# Patient Record
Sex: FEMALE | Race: WHITE | Employment: FULL TIME | URBAN - METROPOLITAN AREA
[De-identification: names, ages, dates, MRNs, and addresses within clinical notes are randomized per-mention and may not be internally consistent; named-entity substitution may affect disease eponyms.]

---

## 2017-02-10 RX ORDER — KETOROLAC TROMETHAMINE 10 MG/1
10 TABLET, FILM COATED ORAL 3 TIMES DAILY PRN
Qty: 30 TABLET | Refills: 0 | Status: SHIPPED | OUTPATIENT
Start: 2017-02-10 | End: 2017-05-17 | Stop reason: SDUPTHER

## 2017-05-17 ENCOUNTER — OFFICE VISIT (OUTPATIENT)
Dept: FAMILY MEDICINE CLINIC | Age: 32
End: 2017-05-17

## 2017-05-17 VITALS
HEIGHT: 62 IN | DIASTOLIC BLOOD PRESSURE: 84 MMHG | SYSTOLIC BLOOD PRESSURE: 104 MMHG | BODY MASS INDEX: 48.21 KG/M2 | HEART RATE: 96 BPM | WEIGHT: 262 LBS

## 2017-05-17 DIAGNOSIS — F39 MOOD DISORDER (HCC): Primary | ICD-10-CM

## 2017-05-17 PROCEDURE — 99213 OFFICE O/P EST LOW 20 MIN: CPT | Performed by: FAMILY MEDICINE

## 2017-05-17 RX ORDER — KETOROLAC TROMETHAMINE 10 MG/1
10 TABLET, FILM COATED ORAL 3 TIMES DAILY PRN
Qty: 30 TABLET | Refills: 0 | Status: SHIPPED | OUTPATIENT
Start: 2017-05-17 | End: 2017-06-26 | Stop reason: CLARIF

## 2017-05-17 RX ORDER — CARBAMAZEPINE 200 MG/1
CAPSULE, EXTENDED RELEASE ORAL
Qty: 120 CAPSULE | Refills: 3 | Status: SHIPPED | OUTPATIENT
Start: 2017-05-17 | End: 2017-06-19 | Stop reason: SDUPTHER

## 2017-05-17 ASSESSMENT — PATIENT HEALTH QUESTIONNAIRE - PHQ9
10. IF YOU CHECKED OFF ANY PROBLEMS, HOW DIFFICULT HAVE THESE PROBLEMS MADE IT FOR YOU TO DO YOUR WORK, TAKE CARE OF THINGS AT HOME, OR GET ALONG WITH OTHER PEOPLE: 3
8. MOVING OR SPEAKING SO SLOWLY THAT OTHER PEOPLE COULD HAVE NOTICED. OR THE OPPOSITE, BEING SO FIGETY OR RESTLESS THAT YOU HAVE BEEN MOVING AROUND A LOT MORE THAN USUAL: 2
5. POOR APPETITE OR OVEREATING: 3
7. TROUBLE CONCENTRATING ON THINGS, SUCH AS READING THE NEWSPAPER OR WATCHING TELEVISION: 3
1. LITTLE INTEREST OR PLEASURE IN DOING THINGS: 3
SUM OF ALL RESPONSES TO PHQ9 QUESTIONS 1 & 2: 3
4. FEELING TIRED OR HAVING LITTLE ENERGY: 3
9. THOUGHTS THAT YOU WOULD BE BETTER OFF DEAD, OR OF HURTING YOURSELF: 3
SUM OF ALL RESPONSES TO PHQ QUESTIONS 1-9: 23
3. TROUBLE FALLING OR STAYING ASLEEP: 3
6. FEELING BAD ABOUT YOURSELF - OR THAT YOU ARE A FAILURE OR HAVE LET YOURSELF OR YOUR FAMILY DOWN: 3

## 2017-06-19 ENCOUNTER — OFFICE VISIT (OUTPATIENT)
Dept: FAMILY MEDICINE CLINIC | Age: 32
End: 2017-06-19

## 2017-06-19 VITALS
WEIGHT: 261 LBS | SYSTOLIC BLOOD PRESSURE: 126 MMHG | DIASTOLIC BLOOD PRESSURE: 86 MMHG | HEART RATE: 88 BPM | BODY MASS INDEX: 47.74 KG/M2

## 2017-06-19 DIAGNOSIS — F31.81 BIPOLAR 2 DISORDER (HCC): Primary | ICD-10-CM

## 2017-06-19 DIAGNOSIS — F90.0 ADHD, PREDOMINANTLY INATTENTIVE TYPE: ICD-10-CM

## 2017-06-19 LAB
CARBAMAZEPINE DOSE: NORMAL
CARBAMAZEPINE LEVEL: 6.2 UG/ML (ref 4–12)

## 2017-06-19 PROCEDURE — 99213 OFFICE O/P EST LOW 20 MIN: CPT | Performed by: FAMILY MEDICINE

## 2017-06-19 RX ORDER — CARBAMAZEPINE 200 MG/1
400 CAPSULE, EXTENDED RELEASE ORAL 2 TIMES DAILY
Qty: 120 CAPSULE | Refills: 3 | COMMUNITY
Start: 2017-06-19 | End: 2018-01-08 | Stop reason: SDUPTHER

## 2017-06-26 ENCOUNTER — TELEPHONE (OUTPATIENT)
Dept: FAMILY MEDICINE CLINIC | Age: 32
End: 2017-06-26

## 2018-01-08 ENCOUNTER — OFFICE VISIT (OUTPATIENT)
Dept: FAMILY MEDICINE CLINIC | Age: 33
End: 2018-01-08

## 2018-01-08 VITALS
BODY MASS INDEX: 48.1 KG/M2 | DIASTOLIC BLOOD PRESSURE: 82 MMHG | SYSTOLIC BLOOD PRESSURE: 118 MMHG | HEART RATE: 101 BPM | WEIGHT: 263 LBS

## 2018-01-08 DIAGNOSIS — F31.81 BIPOLAR 2 DISORDER (HCC): Primary | ICD-10-CM

## 2018-01-08 DIAGNOSIS — F32.1 MODERATE SINGLE CURRENT EPISODE OF MAJOR DEPRESSIVE DISORDER (HCC): Chronic | ICD-10-CM

## 2018-01-08 PROCEDURE — 4004F PT TOBACCO SCREEN RCVD TLK: CPT | Performed by: FAMILY MEDICINE

## 2018-01-08 PROCEDURE — G8417 CALC BMI ABV UP PARAM F/U: HCPCS | Performed by: FAMILY MEDICINE

## 2018-01-08 PROCEDURE — 99213 OFFICE O/P EST LOW 20 MIN: CPT | Performed by: FAMILY MEDICINE

## 2018-01-08 PROCEDURE — G8484 FLU IMMUNIZE NO ADMIN: HCPCS | Performed by: FAMILY MEDICINE

## 2018-01-08 PROCEDURE — G8427 DOCREV CUR MEDS BY ELIG CLIN: HCPCS | Performed by: FAMILY MEDICINE

## 2018-01-08 RX ORDER — VENLAFAXINE HYDROCHLORIDE 37.5 MG/1
37.5 CAPSULE, EXTENDED RELEASE ORAL DAILY
Qty: 21 CAPSULE | Refills: 3 | Status: SHIPPED | OUTPATIENT
Start: 2018-01-08 | End: 2018-02-12

## 2018-01-08 RX ORDER — VENLAFAXINE HYDROCHLORIDE 150 MG/1
150 CAPSULE, EXTENDED RELEASE ORAL DAILY
Qty: 30 CAPSULE | Refills: 3 | Status: SHIPPED | OUTPATIENT
Start: 2018-01-08 | End: 2018-06-29 | Stop reason: SDUPTHER

## 2018-01-08 RX ORDER — CARBAMAZEPINE 200 MG/1
400 CAPSULE, EXTENDED RELEASE ORAL 2 TIMES DAILY
Qty: 120 CAPSULE | Refills: 5 | Status: SHIPPED | OUTPATIENT
Start: 2018-01-08 | End: 2018-08-22 | Stop reason: SDUPTHER

## 2018-01-22 ENCOUNTER — TELEPHONE (OUTPATIENT)
Dept: FAMILY MEDICINE CLINIC | Age: 33
End: 2018-01-22

## 2018-01-22 RX ORDER — VENLAFAXINE HYDROCHLORIDE 37.5 MG/1
37.5 CAPSULE, EXTENDED RELEASE ORAL DAILY
Qty: 7 CAPSULE | Refills: 0 | Status: SHIPPED | OUTPATIENT
Start: 2018-01-22 | End: 2018-02-12

## 2018-01-22 RX ORDER — VENLAFAXINE HYDROCHLORIDE 75 MG/1
75 CAPSULE, EXTENDED RELEASE ORAL DAILY
Qty: 30 CAPSULE | Refills: 3 | Status: SHIPPED | OUTPATIENT
Start: 2018-01-22 | End: 2018-02-12

## 2018-02-12 ENCOUNTER — OFFICE VISIT (OUTPATIENT)
Dept: FAMILY MEDICINE CLINIC | Age: 33
End: 2018-02-12

## 2018-02-12 VITALS
BODY MASS INDEX: 48.1 KG/M2 | SYSTOLIC BLOOD PRESSURE: 118 MMHG | HEART RATE: 83 BPM | DIASTOLIC BLOOD PRESSURE: 80 MMHG | WEIGHT: 263 LBS

## 2018-02-12 DIAGNOSIS — F90.0 ADHD, PREDOMINANTLY INATTENTIVE TYPE: Primary | ICD-10-CM

## 2018-02-12 DIAGNOSIS — F31.81 BIPOLAR 2 DISORDER (HCC): ICD-10-CM

## 2018-02-12 PROCEDURE — 99213 OFFICE O/P EST LOW 20 MIN: CPT | Performed by: FAMILY MEDICINE

## 2018-02-12 PROCEDURE — 4004F PT TOBACCO SCREEN RCVD TLK: CPT | Performed by: FAMILY MEDICINE

## 2018-02-12 PROCEDURE — G8484 FLU IMMUNIZE NO ADMIN: HCPCS | Performed by: FAMILY MEDICINE

## 2018-02-12 PROCEDURE — G8417 CALC BMI ABV UP PARAM F/U: HCPCS | Performed by: FAMILY MEDICINE

## 2018-02-12 PROCEDURE — G8427 DOCREV CUR MEDS BY ELIG CLIN: HCPCS | Performed by: FAMILY MEDICINE

## 2018-02-12 RX ORDER — DEXTROAMPHETAMINE SACCHARATE, AMPHETAMINE ASPARTATE, DEXTROAMPHETAMINE SULFATE AND AMPHETAMINE SULFATE 2.5; 2.5; 2.5; 2.5 MG/1; MG/1; MG/1; MG/1
10 TABLET ORAL DAILY
Qty: 30 TABLET | Refills: 0 | Status: SHIPPED | OUTPATIENT
Start: 2018-02-12 | End: 2018-03-05 | Stop reason: SDUPTHER

## 2018-03-05 ENCOUNTER — OFFICE VISIT (OUTPATIENT)
Dept: FAMILY MEDICINE CLINIC | Age: 33
End: 2018-03-05

## 2018-03-05 VITALS
BODY MASS INDEX: 46.93 KG/M2 | OXYGEN SATURATION: 98 % | HEART RATE: 96 BPM | SYSTOLIC BLOOD PRESSURE: 122 MMHG | WEIGHT: 255 LBS | HEIGHT: 62 IN | DIASTOLIC BLOOD PRESSURE: 84 MMHG | RESPIRATION RATE: 14 BRPM

## 2018-03-05 DIAGNOSIS — F90.0 ADHD, PREDOMINANTLY INATTENTIVE TYPE: Primary | ICD-10-CM

## 2018-03-05 DIAGNOSIS — F31.81 BIPOLAR 2 DISORDER (HCC): ICD-10-CM

## 2018-03-05 PROCEDURE — G8427 DOCREV CUR MEDS BY ELIG CLIN: HCPCS | Performed by: FAMILY MEDICINE

## 2018-03-05 PROCEDURE — 99213 OFFICE O/P EST LOW 20 MIN: CPT | Performed by: FAMILY MEDICINE

## 2018-03-05 PROCEDURE — G8484 FLU IMMUNIZE NO ADMIN: HCPCS | Performed by: FAMILY MEDICINE

## 2018-03-05 PROCEDURE — 4004F PT TOBACCO SCREEN RCVD TLK: CPT | Performed by: FAMILY MEDICINE

## 2018-03-05 PROCEDURE — G8417 CALC BMI ABV UP PARAM F/U: HCPCS | Performed by: FAMILY MEDICINE

## 2018-03-05 RX ORDER — DEXTROAMPHETAMINE SACCHARATE, AMPHETAMINE ASPARTATE, DEXTROAMPHETAMINE SULFATE AND AMPHETAMINE SULFATE 2.5; 2.5; 2.5; 2.5 MG/1; MG/1; MG/1; MG/1
10 TABLET ORAL DAILY
Qty: 30 TABLET | Refills: 0 | Status: SHIPPED | OUTPATIENT
Start: 2018-04-13 | End: 2018-03-27 | Stop reason: SDUPTHER

## 2018-03-05 RX ORDER — DEXTROAMPHETAMINE SACCHARATE, AMPHETAMINE ASPARTATE, DEXTROAMPHETAMINE SULFATE AND AMPHETAMINE SULFATE 2.5; 2.5; 2.5; 2.5 MG/1; MG/1; MG/1; MG/1
10 TABLET ORAL DAILY
Qty: 30 TABLET | Refills: 0 | Status: SHIPPED | OUTPATIENT
Start: 2018-03-14 | End: 2018-04-13

## 2018-03-05 RX ORDER — DEXTROAMPHETAMINE SACCHARATE, AMPHETAMINE ASPARTATE, DEXTROAMPHETAMINE SULFATE AND AMPHETAMINE SULFATE 2.5; 2.5; 2.5; 2.5 MG/1; MG/1; MG/1; MG/1
10 TABLET ORAL DAILY
Qty: 30 TABLET | Refills: 0 | Status: SHIPPED | OUTPATIENT
Start: 2018-05-13 | End: 2018-03-27 | Stop reason: SDUPTHER

## 2018-03-05 NOTE — PROGRESS NOTES
Subjective:      Patient ID: Robin Flowers is a 34 y.o. female. HPI  Has lost 8 lbs. Going to [employer], will walk a mile on the treadmill   The Adderall smooths off the drop off from [address] happy   Work and housework are better    Review of Systems    Objective:   Physical Exam   Cardiovascular: Normal rate, regular rhythm and normal heart sounds. Pulmonary/Chest: Effort normal and breath sounds normal.   Psychiatric: She has a normal mood and affect.  Her behavior is normal.     Vitals:    03/05/18 1030   BP: 122/84   Site: Left Arm   Position: Sitting   Cuff Size: Large Adult   Pulse: 96   Resp: 14   SpO2: 98%   Weight: 255 lb (115.7 kg)   Height: 5' 2\" (1.575 m)         Assessment:       ADHD, conrtolled        Plan:      Refill meds    3 months

## 2018-03-27 ENCOUNTER — OFFICE VISIT (OUTPATIENT)
Dept: FAMILY MEDICINE CLINIC | Age: 33
End: 2018-03-27

## 2018-03-27 VITALS
SYSTOLIC BLOOD PRESSURE: 136 MMHG | BODY MASS INDEX: 47.37 KG/M2 | WEIGHT: 259 LBS | HEART RATE: 93 BPM | DIASTOLIC BLOOD PRESSURE: 92 MMHG

## 2018-03-27 DIAGNOSIS — G43.001 MIGRAINE WITHOUT AURA AND WITH STATUS MIGRAINOSUS, NOT INTRACTABLE: Primary | ICD-10-CM

## 2018-03-27 PROCEDURE — 99213 OFFICE O/P EST LOW 20 MIN: CPT | Performed by: FAMILY MEDICINE

## 2018-03-27 PROCEDURE — G8427 DOCREV CUR MEDS BY ELIG CLIN: HCPCS | Performed by: FAMILY MEDICINE

## 2018-03-27 PROCEDURE — G8417 CALC BMI ABV UP PARAM F/U: HCPCS | Performed by: FAMILY MEDICINE

## 2018-03-27 PROCEDURE — G8484 FLU IMMUNIZE NO ADMIN: HCPCS | Performed by: FAMILY MEDICINE

## 2018-03-27 PROCEDURE — 4004F PT TOBACCO SCREEN RCVD TLK: CPT | Performed by: FAMILY MEDICINE

## 2018-03-27 RX ORDER — HYDROCODONE BITARTRATE AND ACETAMINOPHEN 5; 325 MG/1; MG/1
1 TABLET ORAL EVERY 6 HOURS PRN
Qty: 20 TABLET | Refills: 0 | Status: SHIPPED | OUTPATIENT
Start: 2018-03-27 | End: 2018-04-16

## 2018-03-27 RX ORDER — SUMATRIPTAN 50 MG/1
50 TABLET, FILM COATED ORAL
Qty: 9 TABLET | Refills: 3 | Status: SHIPPED | OUTPATIENT
Start: 2018-03-27 | End: 2018-06-05

## 2018-03-30 ENCOUNTER — TELEPHONE (OUTPATIENT)
Dept: FAMILY MEDICINE CLINIC | Age: 33
End: 2018-03-30

## 2018-04-19 ENCOUNTER — TELEPHONE (OUTPATIENT)
Dept: FAMILY MEDICINE CLINIC | Age: 33
End: 2018-04-19

## 2018-04-19 RX ORDER — DEXTROAMPHETAMINE SACCHARATE, AMPHETAMINE ASPARTATE, DEXTROAMPHETAMINE SULFATE AND AMPHETAMINE SULFATE 2.5; 2.5; 2.5; 2.5 MG/1; MG/1; MG/1; MG/1
10 TABLET ORAL DAILY
Qty: 30 TABLET | Refills: 0 | Status: CANCELLED | OUTPATIENT
Start: 2018-04-19 | End: 2018-05-19

## 2018-06-05 ENCOUNTER — OFFICE VISIT (OUTPATIENT)
Dept: FAMILY MEDICINE CLINIC | Age: 33
End: 2018-06-05

## 2018-06-05 VITALS
DIASTOLIC BLOOD PRESSURE: 78 MMHG | BODY MASS INDEX: 48.1 KG/M2 | SYSTOLIC BLOOD PRESSURE: 112 MMHG | HEART RATE: 84 BPM | WEIGHT: 263 LBS

## 2018-06-05 DIAGNOSIS — F41.8 DEPRESSION WITH ANXIETY: ICD-10-CM

## 2018-06-05 DIAGNOSIS — F90.0 ADHD, PREDOMINANTLY INATTENTIVE TYPE: Primary | ICD-10-CM

## 2018-06-05 DIAGNOSIS — F32.81 PMDD (PREMENSTRUAL DYSPHORIC DISORDER): ICD-10-CM

## 2018-06-05 PROCEDURE — 4004F PT TOBACCO SCREEN RCVD TLK: CPT | Performed by: FAMILY MEDICINE

## 2018-06-05 PROCEDURE — 99213 OFFICE O/P EST LOW 20 MIN: CPT | Performed by: FAMILY MEDICINE

## 2018-06-05 PROCEDURE — G8417 CALC BMI ABV UP PARAM F/U: HCPCS | Performed by: FAMILY MEDICINE

## 2018-06-05 PROCEDURE — G8427 DOCREV CUR MEDS BY ELIG CLIN: HCPCS | Performed by: FAMILY MEDICINE

## 2018-06-05 RX ORDER — VENLAFAXINE HYDROCHLORIDE 75 MG/1
CAPSULE, EXTENDED RELEASE ORAL
Qty: 30 CAPSULE | Refills: 1 | Status: SHIPPED | OUTPATIENT
Start: 2018-06-05 | End: 2018-12-03 | Stop reason: SDUPTHER

## 2018-06-05 RX ORDER — DEXTROAMPHETAMINE SACCHARATE, AMPHETAMINE ASPARTATE, DEXTROAMPHETAMINE SULFATE AND AMPHETAMINE SULFATE 2.5; 2.5; 2.5; 2.5 MG/1; MG/1; MG/1; MG/1
10 TABLET ORAL DAILY
Qty: 30 TABLET | Refills: 0 | Status: SHIPPED | OUTPATIENT
Start: 2018-08-11 | End: 2018-12-03 | Stop reason: SDUPTHER

## 2018-06-05 RX ORDER — DEXTROAMPHETAMINE SACCHARATE, AMPHETAMINE ASPARTATE, DEXTROAMPHETAMINE SULFATE AND AMPHETAMINE SULFATE 2.5; 2.5; 2.5; 2.5 MG/1; MG/1; MG/1; MG/1
10 TABLET ORAL DAILY
Qty: 30 TABLET | Refills: 0 | Status: SHIPPED | OUTPATIENT
Start: 2018-07-12 | End: 2018-12-03 | Stop reason: SDUPTHER

## 2018-06-05 RX ORDER — DEXTROAMPHETAMINE SACCHARATE, AMPHETAMINE ASPARTATE, DEXTROAMPHETAMINE SULFATE AND AMPHETAMINE SULFATE 2.5; 2.5; 2.5; 2.5 MG/1; MG/1; MG/1; MG/1
10 TABLET ORAL DAILY
Qty: 30 TABLET | Refills: 0 | Status: SHIPPED | OUTPATIENT
Start: 2018-06-12 | End: 2018-12-03 | Stop reason: SDUPTHER

## 2018-06-05 ASSESSMENT — PATIENT HEALTH QUESTIONNAIRE - PHQ9
SUM OF ALL RESPONSES TO PHQ9 QUESTIONS 1 & 2: 2
1. LITTLE INTEREST OR PLEASURE IN DOING THINGS: 1
2. FEELING DOWN, DEPRESSED OR HOPELESS: 1
SUM OF ALL RESPONSES TO PHQ QUESTIONS 1-9: 2

## 2018-08-22 RX ORDER — CARBAMAZEPINE 200 MG/1
CAPSULE, EXTENDED RELEASE ORAL
Qty: 120 CAPSULE | Refills: 3 | Status: SHIPPED | OUTPATIENT
Start: 2018-08-22 | End: 2018-12-03 | Stop reason: SDUPTHER

## 2018-12-03 ENCOUNTER — OFFICE VISIT (OUTPATIENT)
Dept: FAMILY MEDICINE CLINIC | Age: 33
End: 2018-12-03
Payer: MEDICAID

## 2018-12-03 VITALS
WEIGHT: 263 LBS | BODY MASS INDEX: 48.1 KG/M2 | HEART RATE: 86 BPM | SYSTOLIC BLOOD PRESSURE: 110 MMHG | DIASTOLIC BLOOD PRESSURE: 74 MMHG

## 2018-12-03 DIAGNOSIS — F90.0 ADHD, PREDOMINANTLY INATTENTIVE TYPE: ICD-10-CM

## 2018-12-03 PROCEDURE — 99213 OFFICE O/P EST LOW 20 MIN: CPT | Performed by: FAMILY MEDICINE

## 2018-12-03 PROCEDURE — G8484 FLU IMMUNIZE NO ADMIN: HCPCS | Performed by: FAMILY MEDICINE

## 2018-12-03 PROCEDURE — G8427 DOCREV CUR MEDS BY ELIG CLIN: HCPCS | Performed by: FAMILY MEDICINE

## 2018-12-03 PROCEDURE — G8417 CALC BMI ABV UP PARAM F/U: HCPCS | Performed by: FAMILY MEDICINE

## 2018-12-03 PROCEDURE — 4004F PT TOBACCO SCREEN RCVD TLK: CPT | Performed by: FAMILY MEDICINE

## 2018-12-03 RX ORDER — DEXTROAMPHETAMINE SACCHARATE, AMPHETAMINE ASPARTATE, DEXTROAMPHETAMINE SULFATE AND AMPHETAMINE SULFATE 2.5; 2.5; 2.5; 2.5 MG/1; MG/1; MG/1; MG/1
10 TABLET ORAL DAILY
Qty: 30 TABLET | Refills: 0 | Status: SHIPPED | OUTPATIENT
Start: 2019-01-02 | End: 2019-02-01

## 2018-12-03 RX ORDER — DEXTROAMPHETAMINE SACCHARATE, AMPHETAMINE ASPARTATE, DEXTROAMPHETAMINE SULFATE AND AMPHETAMINE SULFATE 2.5; 2.5; 2.5; 2.5 MG/1; MG/1; MG/1; MG/1
10 TABLET ORAL DAILY
Qty: 30 TABLET | Refills: 0 | Status: SHIPPED | OUTPATIENT
Start: 2018-12-03 | End: 2019-01-02

## 2018-12-03 RX ORDER — DEXTROAMPHETAMINE SACCHARATE, AMPHETAMINE ASPARTATE, DEXTROAMPHETAMINE SULFATE AND AMPHETAMINE SULFATE 2.5; 2.5; 2.5; 2.5 MG/1; MG/1; MG/1; MG/1
10 TABLET ORAL DAILY
Qty: 30 TABLET | Refills: 0 | Status: SHIPPED | OUTPATIENT
Start: 2019-02-01 | End: 2019-03-03

## 2018-12-03 RX ORDER — CARBAMAZEPINE 200 MG/1
400 CAPSULE, EXTENDED RELEASE ORAL 2 TIMES DAILY
Qty: 120 CAPSULE | Refills: 5 | Status: SHIPPED | OUTPATIENT
Start: 2018-12-03

## 2018-12-03 RX ORDER — VENLAFAXINE HYDROCHLORIDE 75 MG/1
CAPSULE, EXTENDED RELEASE ORAL
Qty: 30 CAPSULE | Refills: 1 | Status: SHIPPED | OUTPATIENT
Start: 2018-12-03

## 2018-12-03 RX ORDER — VENLAFAXINE HYDROCHLORIDE 150 MG/1
150 CAPSULE, EXTENDED RELEASE ORAL DAILY
Qty: 30 CAPSULE | Refills: 5 | Status: SHIPPED | OUTPATIENT
Start: 2018-12-03

## 2018-12-03 NOTE — PROGRESS NOTES
Subjective:      Patient ID: Bk Ng is a 28 y.o. female. HPI   Higher dose of Effexor does reduce her irritability from MDD   Vyvanse and Adderall were working well, but she ran out of these while her insurance changed, but that has been restored    Trying to eat less but has not lost weight   had a migraine in Nov - responded to asa and Motrin  Review of Systems    Objective:   Physical Exam   Cardiovascular: Normal rate and regular rhythm.     Pulmonary/Chest: Effort normal and breath sounds normal.     Vitals:    12/03/18 0946   BP: 110/74   Pulse: 86   Weight: 263 lb (119.3 kg)       Assessment:    ADHD controlled  PMDD controlled      Plan:    Refill meds  Med Agreement        Michael Park MD

## 2021-05-11 ENCOUNTER — HOSPITAL ENCOUNTER (EMERGENCY)
Age: 36
Discharge: HOME | End: 2021-05-11
Attending: EMERGENCY MEDICINE
Payer: MEDICAID

## 2021-05-11 ENCOUNTER — APPOINTMENT (OUTPATIENT)
Dept: GENERAL RADIOLOGY | Age: 36
End: 2021-05-11
Payer: MEDICAID

## 2021-05-11 VITALS
SYSTOLIC BLOOD PRESSURE: 148 MMHG | TEMPERATURE: 97.9 F | DIASTOLIC BLOOD PRESSURE: 94 MMHG | RESPIRATION RATE: 16 BRPM | HEART RATE: 98 BPM | WEIGHT: 255.51 LBS | OXYGEN SATURATION: 97 % | BODY MASS INDEX: 46.73 KG/M2

## 2021-05-11 DIAGNOSIS — K44.9 HIATAL HERNIA: Primary | ICD-10-CM

## 2021-05-11 LAB
ANION GAP SERPL CALCULATED.3IONS-SCNC: 12 MMOL/L (ref 3–16)
BASOPHILS ABSOLUTE: 0.1 K/UL (ref 0–0.2)
BASOPHILS RELATIVE PERCENT: 1 %
BUN BLDV-MCNC: 10 MG/DL (ref 7–20)
CALCIUM SERPL-MCNC: 8.5 MG/DL (ref 8.3–10.6)
CHLORIDE BLD-SCNC: 103 MMOL/L (ref 99–110)
CO2: 22 MMOL/L (ref 21–32)
CREAT SERPL-MCNC: 0.8 MG/DL (ref 0.6–1.1)
EOSINOPHILS ABSOLUTE: 0.3 K/UL (ref 0–0.6)
EOSINOPHILS RELATIVE PERCENT: 3.3 %
GFR AFRICAN AMERICAN: >60
GFR NON-AFRICAN AMERICAN: >60
GLUCOSE BLD-MCNC: 108 MG/DL (ref 70–99)
HCT VFR BLD CALC: 40 % (ref 36–48)
HEMOGLOBIN: 13.4 G/DL (ref 12–16)
LYMPHOCYTES ABSOLUTE: 2.5 K/UL (ref 1–5.1)
LYMPHOCYTES RELATIVE PERCENT: 31.1 %
MCH RBC QN AUTO: 30.9 PG (ref 26–34)
MCHC RBC AUTO-ENTMCNC: 33.6 G/DL (ref 31–36)
MCV RBC AUTO: 92 FL (ref 80–100)
MONOCYTES ABSOLUTE: 0.9 K/UL (ref 0–1.3)
MONOCYTES RELATIVE PERCENT: 10.7 %
NEUTROPHILS ABSOLUTE: 4.3 K/UL (ref 1.7–7.7)
NEUTROPHILS RELATIVE PERCENT: 53.9 %
PDW BLD-RTO: 13 % (ref 12.4–15.4)
PLATELET # BLD: 288 K/UL (ref 135–450)
PMV BLD AUTO: 6.5 FL (ref 5–10.5)
POTASSIUM SERPL-SCNC: 3.7 MMOL/L (ref 3.5–5.1)
RBC # BLD: 4.35 M/UL (ref 4–5.2)
SODIUM BLD-SCNC: 137 MMOL/L (ref 136–145)
TROPONIN: <0.01 NG/ML
WBC # BLD: 8 K/UL (ref 4–11)

## 2021-05-11 PROCEDURE — 85025 COMPLETE CBC W/AUTO DIFF WBC: CPT

## 2021-05-11 PROCEDURE — 93005 ELECTROCARDIOGRAM TRACING: CPT | Performed by: EMERGENCY MEDICINE

## 2021-05-11 PROCEDURE — 71046 X-RAY EXAM CHEST 2 VIEWS: CPT

## 2021-05-11 PROCEDURE — 80048 BASIC METABOLIC PNL TOTAL CA: CPT

## 2021-05-11 PROCEDURE — 84484 ASSAY OF TROPONIN QUANT: CPT

## 2021-05-11 PROCEDURE — 99283 EMERGENCY DEPT VISIT LOW MDM: CPT

## 2021-05-11 RX ORDER — PANTOPRAZOLE SODIUM 20 MG/1
20 TABLET, DELAYED RELEASE ORAL DAILY
Qty: 30 TABLET | Refills: 0 | Status: SHIPPED | OUTPATIENT
Start: 2021-05-11

## 2021-05-11 ASSESSMENT — PAIN DESCRIPTION - LOCATION: LOCATION: CHEST

## 2021-05-11 ASSESSMENT — PAIN DESCRIPTION - DESCRIPTORS: DESCRIPTORS: ACHING

## 2021-05-12 LAB
EKG ATRIAL RATE: 101 BPM
EKG DIAGNOSIS: NORMAL
EKG P AXIS: 19 DEGREES
EKG P-R INTERVAL: 144 MS
EKG Q-T INTERVAL: 328 MS
EKG QRS DURATION: 68 MS
EKG QTC CALCULATION (BAZETT): 425 MS
EKG R AXIS: 25 DEGREES
EKG T AXIS: 51 DEGREES
EKG VENTRICULAR RATE: 101 BPM

## 2021-05-12 PROCEDURE — 93010 ELECTROCARDIOGRAM REPORT: CPT | Performed by: INTERNAL MEDICINE

## 2021-05-12 ASSESSMENT — ENCOUNTER SYMPTOMS
SHORTNESS OF BREATH: 0
CONSTIPATION: 0
EYE DISCHARGE: 0
COUGH: 0
VOMITING: 0
COLOR CHANGE: 0
ABDOMINAL PAIN: 0
EYE ITCHING: 0

## 2021-05-12 NOTE — ED PROVIDER NOTES
[address] [facility]      Pt Name: Rajan To  MRN: [id number]  [place] 1985  Date of evaluation: 5/11/2021  Provider: Jame Mayberry MD    CHIEF COMPLAINT       Chief Complaint   Patient presents with    Chest Pain     [facility] 929p[facility] is a 38 y.[facility]. female who presents to the emergency department with chest pain. Patient endorses what she describes as contractions to her chest.  States she has a history of acid reflux. Is on Prilosec but not helping. Has been coming in waves for the last 24 hours. States got significantly worse around 9:30 PM.  Pain has almost resolved since getting to the emergency room. Currently 1 out of 10 burning in nature. No shortness of breath. Has not taken anything for pain. Symptoms are not exertional.  No other associated symptoms. Nursing Notes were reviewed. Including nursing noted for FM, Surgical History, Past Medical History, Social History, vitals, and allergies; agree with all. REVIEW OF SYSTEMS       Review of Systems   Constitutional: Negative for diaphoresis and unexpected weight change. HENT: Negative for congestion and dental problem. Eyes: Negative for discharge and itching. Respiratory: Negative for cough and shortness of breath. Cardiovascular: Positive for chest pain. Negative for leg swelling. Gastrointestinal: Negative for abdominal pain, constipation and vomiting. Endocrine: Negative for cold intolerance and heat intolerance. Genitourinary: Negative for vaginal bleeding, vaginal discharge and vaginal pain. Musculoskeletal: Negative for neck pain and neck stiffness. Skin: Negative for color change and pallor. Neurological: Negative for tremors and weakness. Psychiatric/Behavioral: Negative for agitation and behavioral problems.      Except as noted above the remainder of the review of 10MG,) 10 MG tablet Take 1 tablet by mouth daily for 30 days. , Disp-30 tablet, R-0Normal      ibuprofen (ADVIL;MOTRIN) 600 MG tablet Take 1 tablet by mouth every 6 hours as needed for Pain, Disp-30 tablet, R-0Print       !! - Potential duplicate medications found. Please discuss with provider.           ALLERGIES     Imitrex [sumatriptan] and Penicillins    FAMILY HISTORY        Family History   Problem Relation Age of Onset    Other Mother         graves disease    Diabetes Mother        SOCIAL HISTORY       Social History     Socioeconomic History    Marital status: Single     Spouse name: None    Number of children: None    Years of education: None    Highest education level: None   Occupational History    None   Social Needs    Financial resource strain: None    Food insecurity     Worry: None     Inability: None    Transportation needs     Medical: None     Non-medical: None   Tobacco Use    Smoking status: Current Every Day Smoker     Packs/day: 0.25     Years: 12.00     Pack years: 3.00    Smokeless tobacco: Never Used   Substance and Sexual Activity    Alcohol use: Yes     Comment: Social drinker, maybe once a month    Drug use: No    Sexual activity: Yes     Partners: Female   Lifestyle    Physical activity     Days per week: None     Minutes per session: None    Stress: None   Relationships    Social connections     Talks on phone: None     Gets together: None     Attends [employer] service: None     Active member of club or organization: None     Attends meetings of clubs or organizations: None     Relationship status: None    Intimate partner violence     Fear of current or ex partner: None     Emotionally abused: None     Physically abused: None     Forced sexual activity: None   Other Topics Concern    None   Social History Narrative    None       PHYSICAL EXAM       ED Triage Vitals [05/11/21 2230]   BP Temp Temp Source Pulse Resp SpO2 Height Weight   (!) 149/104 96.3 °F (35.7 °C) Temporal 104 16 97 % -- 255 lb 8.2 oz (115.9 kg)       Physical Exam  Vitals signs and nursing note reviewed. Constitutional:       General: She is not in acute distress. Appearance: She is well-developed. She is not ill-appearing, toxic-appearing or diaphoretic. HENT:      Head: Normocephalic and atraumatic. Right Ear: External ear normal.      Left Ear: External ear normal.   Eyes:      General:         Right eye: No discharge. Left eye: No discharge. Conjunctiva/sclera: Conjunctivae normal.      Pupils: Pupils are equal, round, and reactive to light. Neck:      Musculoskeletal: Normal range of motion and neck supple. Cardiovascular:      Rate and Rhythm: Normal rate and regular rhythm. Heart sounds: No murmur. Pulmonary:      Effort: Pulmonary effort is normal. No respiratory distress. Breath sounds: Normal breath sounds. No wheezing or rales. Abdominal:      General: Bowel sounds are normal. There is no distension. Palpations: Abdomen is soft. There is no mass. Tenderness: There is no abdominal tenderness. There is no guarding or rebound. Genitourinary:     Comments: Deferred  Musculoskeletal: Normal range of motion. General: No deformity. Skin:     General: Skin is warm. Findings: No erythema or rash. Neurological:      Mental Status: She is alert and oriented to person, place, and time. She is not disoriented. Cranial Nerves: No cranial nerve deficit. Motor: No atrophy or abnormal muscle tone. Coordination: Coordination normal.   Psychiatric:         Behavior: Behavior normal.         Thought Content:  Thought content normal.         DIAGNOSTIC RESULTS     EKG: All EKG's are interpreted by the Emergency Department Physician who either signs or Co-signs this chart in the absence of acardiologist.    EKG normal sinus rhythm nonspecific EKG changes no ectopy    RADIOLOGY:   Non-plain film images such as CT, Ultrasoundand MRI are read by the radiologist. Eugenie Rudd radiographic images are visualized and preliminarily interpreted by the emergency physician with the below findings:    Chest x-ray reassuring    ED BEDSIDE ULTRASOUND:   Performed by ED Physician - none    LABS:  Labs Reviewed   BASIC METABOLIC PANEL - Abnormal; Notable for the following components:       Result Value    Glucose 108 (*)     All other components within normal limits    Narrative:     Performed at:  [facility]  [address] [place] [place] [address]   Phone [phone]   TROPONIN    Narrative:     Performed at:  [place] [address] [address] [place], [facility] [place] [address]   Phone [phone]   CBC WITH AUTO DIFFERENTIAL    Narrative:     Performed at:  [place] [address] [address] [facility] [place] [address]   Phone [phone]       All other labs were withinnormal range or not returned as of this dictation. EMERGENCY DEPARTMENT COURSE and DIFFERENTIAL DIAGNOSIS/MDM:     PMH, Surgical Hx, FH, Social Hx reviewed by myself (ETOH usage, Tobacco usage, Drug usage reviewed by myself, no pertinent Hx)- No Pertinent Hx     Old records were reviewed by me     Hiatal hernia noted. My concern is for esophageal spasm. GI follow-up given. Switch from Prilosec to Protonix. Close GI follow-up. I estimate there is LOW risk for Sepsis, MI, Stroke, Tamponade, PTX, Toxicity or other life threatening etiology thus I consider the discharge disposition reasonable. The patient is at low risk for mortality based on demographic, history and clinical factors. Given the best available information and clinical assessment, I estimate the risk of hospitalization to be greater than risk of treatment at home. I have explained to the patient that the risk could rapidly change, given precautions for return and instructions.  Explained to patient that the risk for mortality is low based on demographic, history and clinical factors. I discussed with patient the results of evaluation in the ED, diagnosis, care, and prognosis. The plan is to discharge to home. Patient is in agreement with plan and questions have been answered. I also discussed with patient the reasons which may require a return visit and the importance of follow-up care. The patient is well-appearing, nontoxic, and improved at the time of discharge. Patient agrees to call to arrange follow-up care as directed. Patient understands to return immediately for worsening/change in symptoms. CRITICAL CARE TIME   Total Critical Caretime was 21 minutes, excluding separately reportable procedures. There was a high probability of clinically significant/life threatening deterioration in the patient's condition which required my urgent intervention. PROCEDURES:  Unlessotherwise noted below, none    FINAL IMPRESSION      1. Hiatal hernia          DISPOSITION/PLAN   DISPOSITION Decision To Discharge 05/11/2021 11:38:24 PM    PATIENT REFERRED TO:  [facility]  [address] [place] [id number]  [phone]    Call today      Becky Lane MD  [address] [facility] [address]  [place]    Call today        DISCHARGE MEDICATIONS:  Discharge Medication List as of 5/11/2021 11:48 PM      START taking these medications    Details   pantoprazole (PROTONIX) 20 MG tablet Take 1 tablet by mouth daily, Disp-30 tablet, R-0Print                (Please note that portions ofthis note were completed with a voice recognition program.  Efforts were made to edit the dictations but occasionally words are mis-transcribed.)    Jeb Weiss MD(electronically signed)  Attending Emergency Physician        Jeb Weiss MD  05/12/21 0871

## 2023-01-25 ENCOUNTER — APPOINTMENT (OUTPATIENT)
Dept: CT IMAGING | Age: 38
End: 2023-01-25
Payer: MEDICAID

## 2023-01-25 ENCOUNTER — HOSPITAL ENCOUNTER (EMERGENCY)
Age: 38
Discharge: HOME | End: 2023-01-25
Attending: EMERGENCY MEDICINE
Payer: MEDICAID

## 2023-01-25 VITALS
RESPIRATION RATE: 16 BRPM | DIASTOLIC BLOOD PRESSURE: 100 MMHG | HEIGHT: 62 IN | SYSTOLIC BLOOD PRESSURE: 155 MMHG | TEMPERATURE: 98.4 F | HEART RATE: 89 BPM | WEIGHT: 267.42 LBS | BODY MASS INDEX: 49.21 KG/M2 | OXYGEN SATURATION: 98 %

## 2023-01-25 DIAGNOSIS — M54.42 ACUTE LEFT-SIDED LOW BACK PAIN WITH LEFT-SIDED SCIATICA: Primary | ICD-10-CM

## 2023-01-25 LAB
BILIRUBIN URINE: NEGATIVE
BLOOD, URINE: NEGATIVE
CLARITY: CLEAR
COLOR: YELLOW
GLUCOSE URINE: NEGATIVE MG/DL
HCG(URINE) PREGNANCY TEST: NEGATIVE
KETONES, URINE: NEGATIVE MG/DL
LEUKOCYTE ESTERASE, URINE: NEGATIVE
MICROSCOPIC EXAMINATION: NORMAL
NITRITE, URINE: NEGATIVE
PH UA: 5 (ref 5–8)
PROTEIN UA: NEGATIVE MG/DL
SPECIFIC GRAVITY UA: 1.03 (ref 1–1.03)
URINE REFLEX TO CULTURE: NORMAL
URINE TYPE: NORMAL
UROBILINOGEN, URINE: 0.2 E.U./DL

## 2023-01-25 PROCEDURE — 6370000000 HC RX 637 (ALT 250 FOR IP): Performed by: EMERGENCY MEDICINE

## 2023-01-25 PROCEDURE — 74176 CT ABD & PELVIS W/O CONTRAST: CPT

## 2023-01-25 PROCEDURE — 84703 CHORIONIC GONADOTROPIN ASSAY: CPT

## 2023-01-25 PROCEDURE — 99284 EMERGENCY DEPT VISIT MOD MDM: CPT

## 2023-01-25 PROCEDURE — 81003 URINALYSIS AUTO W/O SCOPE: CPT

## 2023-01-25 RX ORDER — NAPROXEN 500 MG/1
500 TABLET ORAL 2 TIMES DAILY WITH MEALS
Qty: 60 TABLET | Refills: 0 | Status: SHIPPED | OUTPATIENT
Start: 2023-01-25

## 2023-01-25 RX ORDER — LIDOCAINE 4 G/G
1 PATCH TOPICAL ONCE
Status: DISCONTINUED | OUTPATIENT
Start: 2023-01-25 | End: 2023-01-25 | Stop reason: HOSPADM

## 2023-01-25 RX ORDER — METHYLPREDNISOLONE 4 MG/1
4 TABLET ORAL DAILY
Qty: 6 TABLET | Refills: 0 | Status: SHIPPED | OUTPATIENT
Start: 2023-01-25 | End: 2023-01-31

## 2023-01-25 RX ORDER — PREDNISONE 20 MG/1
60 TABLET ORAL ONCE
Status: COMPLETED | OUTPATIENT
Start: 2023-01-25 | End: 2023-01-25

## 2023-01-25 RX ADMIN — PREDNISONE 60 MG: 20 TABLET ORAL at 19:52

## 2023-01-25 RX ADMIN — IBUPROFEN 600 MG: 600 TABLET, FILM COATED ORAL at 17:38

## 2023-01-25 ASSESSMENT — PAIN DESCRIPTION - LOCATION: LOCATION: BACK

## 2023-01-25 ASSESSMENT — PAIN SCALES - GENERAL
PAINLEVEL_OUTOF10: 7
PAINLEVEL_OUTOF10: 6
PAINLEVEL_OUTOF10: 7

## 2023-01-26 NOTE — ED PROVIDER NOTES
[address] [facility]        Pt Name: Gia Lutz  MRN: [id number]  [place] 1985  Date of evaluation: 2023  Provider: Ron Jacques MD  PCP: Prashant Upton MD, MD  Note Started: 7:54 PM EST 23    CHIEF COMPLAINT       Chief Complaint   Patient presents with    Back Pain     Pt states that left lower back pain started , pt \"texted \" pcp on the  and prescribed medications,        HISTORY OF PRESENT ILLNESS: 1 or more Elements     History from : Patient    Limitations to history : None    Gia Lutz is a 44 y.o. female who presents for left lower back pain that began approximately 8 days ago. History of previous chronic back pain. History of previous sciatica. Usually left-sided. Pain is aching and stabbing in nature. Radiates down the left leg. Similar to previous. Ambulating without difficulty. Denies any fever, chills, nausea, vomiting, diarrhea, chest pain, shortness of breath, dysuria, hematuria, saddle anesthesia, bowel or bladder incontinence, gait ataxia, falls, syncope, numbness, weakness, neck pain. Taking methocarbamol at home with little relief. Worse with bending or lifting. Nothing else seems to make it better. Gradual in onset constant and unchanging. Mild to moderate in nature. Nursing Notes were all reviewed and agreed with or any disagreements were addressed in the HPI. REVIEW OF SYSTEMS :      Review of Systems    Positives and Pertinent negatives as per HPI.      SURGICAL HISTORY     Past Surgical History:   Procedure Laterality Date    CARPAL TUNNEL RELEASE Right 2014    CARPAL TUNNEL RELEASE Left 16     SECTION   and     ULNAR TUNNEL RELEASE Left 2016    WISDOM TOOTH EXTRACTION         CURRENTMEDICATIONS       Previous Medications    AMPHETAMINE-DEXTROAMPHETAMINE (ADDERALL, 10MG,) 10 MG TABLET    Take 1 tablet by mouth daily for 30 days. AMPHETAMINE-DEXTROAMPHETAMINE (ADDERALL, 10MG,) 10 MG TABLET    Take 1 tablet by mouth daily for 30 days. Take as the Vyvanse wears off. AMPHETAMINE-DEXTROAMPHETAMINE (ADDERALL, 10MG,) 10 MG TABLET    Take 1 tablet by mouth daily for 30 days. Take as the Vyvanse wears off. AMPHETAMINE-DEXTROAMPHETAMINE (ADDERALL, 10MG,) 10 MG TABLET    Take 1 tablet by mouth daily for 30 days. Take as the Vyvanse wears off. CARBAMAZEPINE (CARBATROL) 200 MG EXTENDED RELEASE CAPSULE    Take 2 capsules by mouth 2 times daily    IBUPROFEN (ADVIL;MOTRIN) 600 MG TABLET    Take 1 tablet by mouth every 6 hours as needed for Pain    LISDEXAMFETAMINE DIMESYLATE (VYVANSE) 40 MG CAPS    Take 40 mg by mouth daily for 30 days. Anju Kaplan LISDEXAMFETAMINE DIMESYLATE (VYVANSE) 40 MG CAPS    Take 40 mg by mouth daily for 30 days. Anju Kaplan LISDEXAMFETAMINE DIMESYLATE (VYVANSE) 40 MG CAPS    Take 1 capsule by mouth daily for 30 days. Anju Kaplan     PANTOPRAZOLE (PROTONIX) 20 MG TABLET    Take 1 tablet by mouth daily    VENLAFAXINE (EFFEXOR XR) 150 MG EXTENDED RELEASE CAPSULE    Take 1 capsule by mouth daily    VENLAFAXINE (EFFEXOR XR) 75 MG EXTENDED RELEASE CAPSULE    Take 1 cap daily for 7 days per month       ALLERGIES     Imitrex [sumatriptan] and Penicillins    FAMILYHISTORY       Family History   Problem Relation Age of Onset    Other Mother         graves disease    Diabetes Mother         SOCIAL HISTORY       Social History     Tobacco Use    Smoking status: Every Day     Packs/day: 0.25     Years: 12.00     Pack years: 3.00     Types: Cigarettes    Smokeless tobacco: Never   Substance Use Topics    Alcohol use: Yes     Comment: Social drinker, maybe once a month    Drug use: No       SCREENINGS        [place]well Coma Scale  Eye Opening: Spontaneous  Best Verbal Response: Oriented  Best Motor Response: Obeys commands  Elwell Coma Scale Score: 15                CIWA Assessment  BP: (!) 161/129  Heart Rate: 99           PHYSICAL EXAM 1 or more Elements     ED Triage Vitals [01/25/23 1704]   BP Temp Temp Source Heart Rate Resp SpO2 Height Weight   (!) 161/129 98.4 °F (36.9 °C) Oral 99 14 97 % 5' 2\" (1.575 m) 267 lb 6.7 oz (121.3 kg)       Physical Exam  Vitals and nursing note reviewed. Constitutional:       General: She is not in acute distress. Appearance: Normal appearance. She is not ill-appearing, toxic-appearing or diaphoretic. HENT:      Head: Normocephalic and atraumatic. Right Ear: External ear normal.      Left Ear: External ear normal.      Mouth/Throat:      Mouth: Mucous membranes are moist.   Eyes:      Extraocular Movements: Extraocular movements intact. Pupils: Pupils are equal, round, and reactive to light. Cardiovascular:      Rate and Rhythm: Normal rate and regular rhythm. Heart sounds: Normal heart sounds. Pulmonary:      Effort: Pulmonary effort is normal. No respiratory distress. Breath sounds: Normal breath sounds. Abdominal:      General: Abdomen is flat. Bowel sounds are normal. There is no distension. Palpations: Abdomen is soft. Tenderness: There is no abdominal tenderness. There is no guarding or rebound. Musculoskeletal:      Cervical back: Normal range of motion and neck supple. No swelling, edema, deformity, erythema, signs of trauma, lacerations, rigidity, spasms, torticollis, tenderness or bony tenderness. No pain with movement. Normal range of motion. Thoracic back: No swelling, edema, deformity, signs of trauma, lacerations, spasms, tenderness or bony tenderness. Normal range of motion. Lumbar back: Tenderness present. No swelling, edema, deformity, signs of trauma, lacerations, spasms or bony tenderness. Normal range of motion. Positive left straight leg raise test. Negative right straight leg raise test.   Lymphadenopathy:      Cervical: No cervical adenopathy. Skin:     General: Skin is warm and dry.       Capillary Refill: Capillary refill takes less than 2 seconds. Neurological:      General: No focal deficit present. Mental Status: She is alert and oriented to person, place, and time. Cranial Nerves: No cranial nerve deficit. Sensory: No sensory deficit. Motor: No weakness. Coordination: Coordination normal.      Gait: Gait normal.   Psychiatric:         Mood and Affect: Mood normal.         Behavior: Behavior normal.         DIAGNOSTIC RESULTS   LABS:    Labs Reviewed   URINALYSIS WITH REFLEX TO CULTURE   PREGNANCY, URINE       When ordered only abnormal lab results are displayed. All other labs were within normal range or not returned as of this dictation. RADIOLOGY:   Non-plain film images such as CT, Ultrasound and MRI are read by the radiologist. Plain radiographic images are visualized and preliminarily interpreted by the ED Provider with the below findings:    Interpretation per the Radiologist below, if available at the time of this note:    CT ABDOMEN PELVIS WO CONTRAST Additional Contrast? None   Final Result   1. No acute abdominal or pelvic findings. 2.  Mild hepatic steatosis      3. Cholelithiasis without evidence of acute cholecystitis      4. Other chronic findings as above           CT ABDOMEN PELVIS WO CONTRAST Additional Contrast? None    Result Date: 1/25/2023  EXAMINATION: CT OF THE ABDOMEN AND PELVIS WITHOUT CONTRAST 1/25/2023 5:47 pm TECHNIQUE: CT of the abdomen and pelvis was performed without the administration of intravenous contrast. Multiplanar reformatted images are provided for review. Automated exposure control, iterative reconstruction, and/or weight based adjustment of the mA/kV was utilized to reduce the radiation dose to as low as reasonably achievable. COMPARISON: None.  HISTORY: ORDERING SYSTEM PROVIDED HISTORY: Left flank Left lower back pain TECHNOLOGIST PROVIDED HISTORY: Reason for exam:->Left flank Left lower back pain Additional Contrast?->None Decision Support Exception - unselect if not a suspected or confirmed emergency medical condition->Emergency Medical Condition ([place]) Is the patient pregnant?->No Reason for Exam: Left flank Left lower back pain FINDINGS: Lower Chest: The lung bases are clear Organs: Limited evaluation of the abdominal organs due to lack of intravenous contrast.  Mild hepatic steatosis. No focal hepatic lesions. Multiple gallstones are seen within the gallbladder. No evidence of surrounding inflammatory change or fluid to suggest acute cholecystitis. The spleen and pancreas are normal.  Adrenal glands are normal.  No renal or ureteral stones. No hydronephrosis or perinephric fat stranding. GI/Bowel: The distal esophagus and stomach are normal.  There is a 1.6 cm air-filled diverticulum arising from the 2nd part of the duodenum. No surrounding inflammatory change. No evidence of small-bowel obstruction. There are a few scattered colonic diverticula without evidence of diverticulitis. No evidence of colonic wall thickening or surrounding inflammatory change. The terminal ileum is normal.  The appendix is normal. Pelvis: The urinary bladder is normal.  The uterus is normal.  The ovaries are normal. Peritoneum/Retroperitoneum: The aorta is normal caliber. No lymphadenopathy. Bones/Soft Tissues: No suspicious osseous lesion. No significant degenerative change in the lumbar spine. 1.  No acute abdominal or pelvic findings. 2.  Mild hepatic steatosis 3. Cholelithiasis without evidence of acute cholecystitis 4. Other chronic findings as above       No results found. PROCEDURES   Unless otherwise noted below, none     Procedures    CRITICAL CARE TIME   Total Critical Care time was 0 minutes, excluding separately reportable procedures. There was a high probability of clinically significant/life threatening deterioration in the patient's condition which required my urgent intervention.       This includes multiple reevaluations, vital sign monitoring, pulse oximetry monitoring, telemetry monitoring, clinical response to the IV medications, reviewing the nursing notes, consultation time, dictation/documentation time, and interpretation of the labwork. (This time excludes time spent performing procedures). PAST MEDICAL HISTORY      has a past medical history of Acid reflux, Anxiety and depression, Bipolar 1 disorder ([address] Migraine, Obesity, and Wears glasses. EMERGENCY DEPARTMENT COURSE and DIFFERENTIAL DIAGNOSIS/MDM:   Vitals:    Vitals:    01/25/23 1704   BP: (!) 161/129   Pulse: 99   Resp: 14   Temp: 98.4 °F (36.9 °C)   TempSrc: Oral   SpO2: 97%   Weight: 267 lb 6.7 oz (121.3 kg)   Height: 5' 2\" (1.575 m)       Is this patient to be included in the SEP-1 Core Measure due to severe sepsis or septic shock? No   Exclusion criteria - the patient is NOT to be included for SEP-1 Core Measure due to: Infection is not suspected    Chronic Conditions: Depression, carpal tunnel syndrome, bipolar 2 disorder, ADHD, chronic back pain    CONSULTS: (Who and What was discussed)  None    Discussion with Other Profesionals : None    Social Determinants : None    Records Reviewed : External ED Note and external ED note from May 19, 2015 where patient was seen for left-sided sciatica    CC/HPI Summary, DDx, ED Course, and Reassessment:     Differential Diagnosis: Spinal Epidural Abscess, Vertebral Osteomyelitis, Cauda Equina Syndrome, Spinal Cord Compression, Conus Medullaris Syndrome, ruptured/dissecting Abdominal Aortic Aneurysm, Metastases to the back, Kidney Stone, Pyelonephritis, Fracture or dislocation, other    54-year-old female presents for left-sided back pain concerning for sciatica differential diagnosis as above. No red flag symptoms. No signs of spinal epidural abscess vertebral osteomyelitis cauda equina syndrome or spinal cord compression. Patient has no midline pulsatile abdominal mass. Patient does have some flank pain component to it.   Will obtain CT abdomen pelvis without contrast to look for renal stone. We will also obtain urine studies to look for pyelonephritis. Will give symptomatic treatment with p.o. ibuprofen and topical lidocaine patch. Will reeval closely and disposition accordingly. ED Course as of 01/25/23 1957 Wed Jan 25, 2023 1955 Urine and scan unremarkable. Likely sciatica. No other acute findings at this time. Will discharge patient with Medrol Dosepak and naproxen. Will refer patient to [facility] spine clinic. Strict return precautions given for any new or worsening symptoms. [SC]      ED Course User Index  [SC] Irena Molina MD       Patient was given the following medications:  Medications   lidocaine 4 % external patch 1 patch (1 patch TransDERmal Patch Applied 1/25/23 1738)   ibuprofen (ADVIL;MOTRIN) tablet 600 mg (600 mg Oral Given 1/25/23 1738)   predniSONE (DELTASONE) tablet 60 mg (60 mg Oral Given 1/25/23 1952)       Disposition Considerations (tests considered but not done, Shared Decision Making, Pt Expectation of Test or Tx.): Considered further laboratory work-up and/or MRI at this time however patient has no signs of sepsis no risk factors for spinal epidural abscess or other red flag symptoms. I utilized an evidence-based risk rating tool (CMT) along with my training and experience to weigh the risk of discharge against the risks of further testing, imaging, or hospitalization. At this time I estimate the risks of additional testing, imaging, or hospitalization to be equal to or greater than the risk of discharge. I discussed my risk assessment with the patient and the patient consents to the risk of discharge as well as the risk of uncertainty in estimating outcomes. Given the symptoms and findings present at this time, the chance of SEA or SCC is so remote that additional testing or imaging is more likely to harm the patient than diagnose SEA.       Diagnosis Severity: Acute on chronic lumbar back pain with sciatica      Appropriate for outpatient management      I estimate there is LOW risk for ABDOMINAL AORTIC ANEURYSM, KIDNEY STONE, PYELONEPHRITIS, CAUDA EQUINA SYNDROME, EPIDURAL MASS LESION, OR CORD COMPRESSION, thus I consider the discharge disposition reasonable. We discussed returning to the Emergency Department immediately if new or worsening symptoms occur. Discussed the symptoms which are most concerning (e.g., saddle anesthesia, urinary or bowel incontinence or retention, changing or worsening pain) that necessitate immediate return. The patient is at low risk for mortality based on demographic, history and clinical factors. Given the best available information and clinical assessment, I estimate the risk of hospitalization to be greater than risk of treatment at home. I have explained to the patient that the risk could rapidly change, given precautions for return and instructions. Explained to patient that the risk for mortality is low based on demographic, history and clinical factors. I discussed with patient the results of evaluation in the ED, diagnosis, care, and prognosis. The plan is to discharge to home. Patient is in agreement with plan and questions have been answered. I also discussed with patient the reasons which may require a return visit and the importance of follow-up care. The patient is well-appearing, nontoxic, and improved at the time of discharge. Patient agrees to call to arrange follow-up care as directed. Patient understands to return immediately for worsening/change in symptoms. I am the Primary Clinician of Record. FINAL IMPRESSION      1.  Acute left-sided low back pain with left-sided sciatica          DISPOSITION/PLAN     DISPOSITION Decision To Discharge 01/25/2023 07:40:29 PM      PATIENT REFERRED TO:  [address] [place] [address]  [id number]  [phone]  Schedule an appointment as soon as possible for a visit       [address] [place] [address] [address]          DISCHARGE MEDICATIONS:  New Prescriptions    METHYLPREDNISOLONE (MEDROL) 4 MG TABLET    Take 1 tablet by mouth daily for 6 days    NAPROXEN (NAPROSYN) 500 MG TABLET    Take 1 tablet by mouth 2 times daily (with meals)       DISCONTINUED MEDICATIONS:  Discontinued Medications    No medications on file              (Please note that portions of this note were completed with a voice recognition program.  Efforts were made to edit the dictations but occasionally words are mis-transcribed.)    Viki Bates MD (electronically signed)            Viki Bates MD  01/25/23 9189

## 2023-01-26 NOTE — ED NOTES
.Pt discharged at this time. Discharge instructions and medications reviewed,  Questions were answered. PT verbalized understanding. VSS, Afebrile. Follow up appointments were discussed.          Craig Richey RN  01/25/23 1752

## 2023-01-30 ENCOUNTER — OFFICE VISIT (OUTPATIENT)
Dept: ORTHOPEDIC SURGERY | Age: 38
End: 2023-01-30
Payer: MEDICAID

## 2023-01-30 VITALS — WEIGHT: 267 LBS | BODY MASS INDEX: 49.13 KG/M2 | HEIGHT: 62 IN

## 2023-01-30 DIAGNOSIS — M51.26 HNP (HERNIATED NUCLEUS PULPOSUS), LUMBAR: ICD-10-CM

## 2023-01-30 DIAGNOSIS — R52 PAIN: Primary | ICD-10-CM

## 2023-01-30 PROCEDURE — 99204 OFFICE O/P NEW MOD 45 MIN: CPT | Performed by: PHYSICIAN ASSISTANT

## 2023-01-30 PROCEDURE — G8484 FLU IMMUNIZE NO ADMIN: HCPCS | Performed by: PHYSICIAN ASSISTANT

## 2023-01-30 PROCEDURE — G8427 DOCREV CUR MEDS BY ELIG CLIN: HCPCS | Performed by: PHYSICIAN ASSISTANT

## 2023-01-30 PROCEDURE — 4004F PT TOBACCO SCREEN RCVD TLK: CPT | Performed by: PHYSICIAN ASSISTANT

## 2023-01-30 PROCEDURE — G8417 CALC BMI ABV UP PARAM F/U: HCPCS | Performed by: PHYSICIAN ASSISTANT

## 2023-01-30 RX ORDER — PREDNISONE 20 MG/1
TABLET ORAL
Qty: 20 TABLET | Refills: 0 | Status: SHIPPED | OUTPATIENT
Start: 2023-01-30

## 2023-01-30 RX ORDER — NORETHINDRONE ACETATE AND ETHINYL ESTRADIOL AND FERROUS FUMARATE 1MG-20(24)
KIT ORAL
COMMUNITY
Start: 2022-12-16

## 2023-01-30 RX ORDER — LEVOTHYROXINE SODIUM 0.05 MG/1
TABLET ORAL
COMMUNITY
Start: 2022-11-16

## 2023-01-30 RX ORDER — NAPROXEN 500 MG/1
500 TABLET ORAL 2 TIMES DAILY WITH MEALS
COMMUNITY

## 2023-01-30 RX ORDER — DOCUSATE SODIUM 100 MG/1
100 CAPSULE, LIQUID FILLED ORAL 2 TIMES DAILY
COMMUNITY

## 2023-01-30 RX ORDER — HYDROXYZINE HYDROCHLORIDE 25 MG/1
25 TABLET, FILM COATED ORAL 3 TIMES DAILY PRN
COMMUNITY

## 2023-01-30 RX ORDER — METHOCARBAMOL 750 MG/1
750 TABLET, FILM COATED ORAL 3 TIMES DAILY
Qty: 90 TABLET | Refills: 0 | Status: SHIPPED | OUTPATIENT
Start: 2023-01-30 | End: 2023-03-01

## 2023-01-30 RX ORDER — METHOCARBAMOL 750 MG/1
750 TABLET, FILM COATED ORAL 4 TIMES DAILY
COMMUNITY

## 2023-01-30 RX ORDER — DEXTROAMPHETAMINE SACCHARATE, AMPHETAMINE ASPARTATE, DEXTROAMPHETAMINE SULFATE AND AMPHETAMINE SULFATE 2.5; 2.5; 2.5; 2.5 MG/1; MG/1; MG/1; MG/1
10 TABLET ORAL DAILY
COMMUNITY

## 2023-01-30 RX ORDER — SUMATRIPTAN 50 MG/1
50 TABLET, FILM COATED ORAL
COMMUNITY

## 2023-01-30 RX ORDER — TRAMADOL HYDROCHLORIDE 50 MG/1
50 TABLET ORAL EVERY 6 HOURS PRN
COMMUNITY

## 2023-01-30 RX ORDER — FOLIC ACID 1 MG/1
TABLET ORAL
COMMUNITY
Start: 2022-12-16

## 2023-01-30 NOTE — LETTER
[address] [place] [address]  [facility] [id number]  Phone: [phone]  Fax: [phone]    Gloria Gaye        January 30, 2023     Patient: Corrinne Hastings   YOB: 1985   Date of Visit: 1/30/2023       To Whom it May Concern:    Leigh Bowie was seen in my clinic on 1/30/2023. She was accompanied by her son Edgardo Machado. If you have any questions or concerns, please don't hesitate to call.     Sincerely,         Alli Carpenter PA-C

## 2023-01-30 NOTE — PROGRESS NOTES
New Patient: LUMBAR SPINE    Referring Provider:  No ref. provider found    CHIEF COMPLAINT:    Chief Complaint   Patient presents with    Back Pain     lumbar       HISTORY OF PRESENT ILLNESS:       Ms. Scott Arana  is a pleasant 44 y.o. female here for consultation regarding her LBP and left posterior lateral leg pain. She states her pain began insidiously several years ago but approximately 12 days ago she was bending over to pet her dog when she had her pain within her low back which radiated into the posterior left leg. She was seen at [facility] ED on 2023 and was evaluated for back pain and discharged with medication. She states at the current time she is experiencing 9/10 low back pain with 8/10 left buttock pain and 8/10 posterior lateral thigh pain to the knee. She states that the pain is most apparent with prolonged sitting, standing, rising from a seated position, leaning forward and walking and somewhat improved with leaning forward and lying down. She has intermittent numbness and tingling into the left buttock and thigh. She denies any progressive weakness, bowel or bladder dysfunction, or saddle anesthesia. Pain at times interferes with her sleep.    Pain Assessment  Location of Pain: Back  Severity of Pain: 9  Quality of Pain: Other (Comment)  Duration of Pain: Other (Comment)  Frequency of Pain: Other (Comment)]  Current/Past Treatment:   Physical Therapy: None  Chiropractic: None  Injection: None  Medications: Naprosyn and tramadol and Robaxin    Past Medical History:   Past Medical History:   Diagnosis Date    Acid reflux     mild    Anxiety and depression     Bipolar 1 disorder (HCC)     Migraine     Obesity     Wears glasses         Past Surgical History:     Past Surgical History:   Procedure Laterality Date    CARPAL TUNNEL RELEASE Right 2014    CARPAL TUNNEL RELEASE Left 16     SECTION   and     ULNAR TUNNEL RELEASE Left 2016    WISDOM TOOTH EXTRACTION  2002       Current Medications:     Current Outpatient Medications:     methylPREDNISolone (MEDROL) 4 MG tablet, Take 1 tablet by mouth daily for 6 days, Disp: 6 tablet, Rfl: 0    naproxen (NAPROSYN) 500 MG tablet, Take 1 tablet by mouth 2 times daily (with meals), Disp: 60 tablet, Rfl: 0    pantoprazole (PROTONIX) 20 MG tablet, Take 1 tablet by mouth daily, Disp: 30 tablet, Rfl: 0    carBAMazepine (CARBATROL) 200 MG extended release capsule, Take 2 capsules by mouth 2 times daily, Disp: 120 capsule, Rfl: 5    venlafaxine (EFFEXOR XR) 150 MG extended release capsule, Take 1 capsule by mouth daily, Disp: 30 capsule, Rfl: 5    venlafaxine (EFFEXOR XR) 75 MG extended release capsule, Take 1 cap daily for 7 days per month, Disp: 30 capsule, Rfl: 1    Lisdexamfetamine Dimesylate (VYVANSE) 40 MG CAPS, Take 40 mg by mouth daily for 30 days.., Disp: 30 capsule, Rfl: 0    Lisdexamfetamine Dimesylate (VYVANSE) 40 MG CAPS, Take 40 mg by mouth daily for 30 days.., Disp: 30 capsule, Rfl: 0    Lisdexamfetamine Dimesylate (VYVANSE) 40 MG CAPS, Take 1 capsule by mouth daily for 30 days.., Disp: 30 capsule, Rfl: 0    amphetamine-dextroamphetamine (ADDERALL, 10MG,) 10 MG tablet, Take 1 tablet by mouth daily for 30 days. Take as the Vyvanse wears off., Disp: 30 tablet, Rfl: 0    amphetamine-dextroamphetamine (ADDERALL, 10MG,) 10 MG tablet, Take 1 tablet by mouth daily for 30 days. Take as the Vyvanse wears off., Disp: 30 tablet, Rfl: 0    amphetamine-dextroamphetamine (ADDERALL, 10MG,) 10 MG tablet, Take 1 tablet by mouth daily for 30 days. Take as the Vyvanse wears off., Disp: 30 tablet, Rfl: 0    amphetamine-dextroamphetamine (ADDERALL, 10MG,) 10 MG tablet, Take 1 tablet by mouth daily for 30 days., Disp: 30 tablet, Rfl: 0    ibuprofen (ADVIL;MOTRIN) 600 MG tablet, Take 1 tablet by mouth every 6 hours as needed for Pain, Disp: 30 tablet, Rfl: 0    Allergies:  Imitrex [sumatriptan] and Penicillins    Social History:     reports that she has been smoking. She has a 3.00 pack-year smoking history. She has never used smokeless tobacco. She reports current alcohol use. She reports that she does not use drugs. Family History:   Family History   Problem Relation Age of Onset    Other Mother         graves disease    Diabetes Mother        REVIEW OF SYSTEMS: Full ROS noted & scanned   CONSTITUTIONAL: Denies unexplained weight loss, fevers, chills or fatigue  NEUROLOGICAL: Denies unsteady gait or progressive weakness  MUSCULOSKELETAL: Denies joint swelling or redness  PSYCHOLOGICAL: Has a history of anxiety, depression, bipolar disorder  SKIN: Denies skin changes, delayed healing, rash, itching   HEMATOLOGIC: Denies easy bleeding or bruising  ENDOCRINE: Denies excessive thirst, urination, heat/cold  RESPIRATORY: Denies current dyspnea, cough  GI: Denies nausea, vomiting, diarrhea   : Denies bowel or bladder issues      PHYSICAL EXAM:    Vitals: Height 5' 2.01\" (1.575 m), weight 267 lb (121.1 kg), last menstrual period 01/11/2023, not currently breastfeeding. GENERAL EXAM:  General Apparence: Patient is adequately groomed with no evidence of malnutrition. Orientation: The patient is oriented to time, place and person. Mood & Affect:The patient's mood and affect are appropriate. Vascular: Examination reveals no swelling tenderness in upper or lower extremities. Good capillary refill. Lymphatic: The lymphatic examination bilaterally reveals all areas to be without enlargement or induration  Sensation: Sensation is intact without deficit  Coordination/Balance: Good coordination. LUMBAR/SACRAL EXAMINATION:  Inspection: Local inspection shows no step-off or bruising. Lumbar alignment is normal.  Sagittal and Coronal balance is neutral.      Palpation:   Diffuse tenderness at the midline. No tenderness bilaterally at the paraspinal or trochanters. There is no step-off or paraspinal spasm.    Range of Motion: Lumbar flexion, extension and rotation are mildly limited due to pain. Strength:   Strength testing is 4/5 in her left hip flexor and 5/5 in all other muscle groups tested. Special Tests:   Straight leg raise and crossed SLR negative. Leg length and pelvis level. Skin: There are no rashes, ulcerations or lesions. Reflexes: Reflexes are symmetrically 2+ at the patellar and ankle tendons. Clonus absent bilaterally at the feet. Gait & station: normal, patient ambulates without assistance    Additional Examinations:   RIGHT LOWER EXTREMITY: Inspection/examination of the right lower extremity does not show any tenderness, deformity or injury. Range of motion is unremarkable. There is no gross instability. There are no rashes, ulcerations or lesions. Strength and tone are normal.  LEFT LOWER EXTREMITY:  Inspection/examination of the left lower extremity does not show any tenderness, deformity or injury. Range of motion is unremarkable. There is no gross instability. There are no rashes, ulcerations or lesions. Strength and tone are normal.    Diagnostic Testing:    X-rays: 2 views of the lumbar spine include AP and lateral were obtained today in the office and independently reviewed with the patient which shows scoliotic deformity with convexity to her right which is most likely due to muscular spasm which was compared to the CT scan of her abdomen that was obtained on 1/25/2023. There is mild to space narrowing at L4-5 and L5-S1. Impression:   Acute lumbar strain  Probable HNP  Obesity    1. Pain        Plan:      We discussed the diagnosis and treatment options including observation, additional oral steroids, physical therapy, epidural injections and additional imaging. She wishes to proceed with outpatient physical therapy for lumbar stabilization and core strengthening exercises with modalities of choice to include dry needling.   He was given prescription for prednisone taper and Robaxin that she should be taken as prescribed. If she finds that she has had no durable benefit from these conservative treatment she will contact the office for scheduling of a lumbar MRI. .    Follow up -as needed    Old records were reviewed.     Luz Barahona PA-C  Board certified by the [facility]

## 2023-02-03 ENCOUNTER — TELEPHONE (OUTPATIENT)
Dept: ORTHOPEDIC SURGERY | Age: 38
End: 2023-02-03

## 2023-02-03 NOTE — TELEPHONE ENCOUNTER
Prescription Refill     Medication Name:  [facility]  Pharmacy: [facility] on [facility] [address]  Patient Contact Number:  [phone]    Requesting Tramadol states that it was previously prescribed by pcp.  Requesting a call to discuss

## 2023-02-13 ENCOUNTER — HOSPITAL ENCOUNTER (OUTPATIENT)
Dept: PHYSICAL THERAPY | Age: 38
Setting detail: THERAPIES SERIES
Discharge: HOME | End: 2023-02-13

## 2023-02-13 NOTE — FLOWSHEET NOTE
[address] [facility] [address]  Phone: [phone]   Fax:     [phone]    Physical Therapy  Cancellation/No-show Note  Patient Name:  Corinna Camacho  :  1985   Date:  2023  Cancelled visits to date: 0  No-shows to date: 1    Patient status for today's appointment patient:  []  Cancelled  []  Rescheduled appointment  [x]  No-show     Reason given by patient:  []  Patient ill  []  Conflicting appointment  []  No transportation    []  Conflict with work  []  No reason given  []  Other:     Comments:      Phone call information:   []  Phone call made today to patient at _ time at number provided:      []  Patient answered, conversation as follows:    []  Patient did not answer, message left as follows:  [x]  Phone call not made today; patient no-show initial eval    Patient no-show initial evaluation. Patient will be removed from schedule at this time and can call back to reschedule if desired.      Electronically signed by:  Silvia Muniz, PT, DPT

## 2023-04-01 ENCOUNTER — APPOINTMENT (OUTPATIENT)
Dept: CT IMAGING | Age: 38
End: 2023-04-01
Payer: MEDICAID

## 2023-04-01 ENCOUNTER — HOSPITAL ENCOUNTER (EMERGENCY)
Age: 38
Discharge: HOME | End: 2023-04-02
Attending: EMERGENCY MEDICINE
Payer: MEDICAID

## 2023-04-01 DIAGNOSIS — K80.20 GALLSTONES: Primary | ICD-10-CM

## 2023-04-01 LAB
BASOPHILS # BLD: 0.1 K/UL (ref 0–0.2)
BASOPHILS NFR BLD: 1.3 %
DEPRECATED RDW RBC AUTO: 15.1 % (ref 12.4–15.4)
EOSINOPHIL # BLD: 0.3 K/UL (ref 0–0.6)
EOSINOPHIL NFR BLD: 2.9 %
HCT VFR BLD AUTO: 38.2 % (ref 36–48)
HGB BLD-MCNC: 13.1 G/DL (ref 12–16)
LYMPHOCYTES # BLD: 3.2 K/UL (ref 1–5.1)
LYMPHOCYTES NFR BLD: 30.3 %
MCH RBC QN AUTO: 30.3 PG (ref 26–34)
MCHC RBC AUTO-ENTMCNC: 34.2 G/DL (ref 31–36)
MCV RBC AUTO: 88.6 FL (ref 80–100)
MONOCYTES # BLD: 0.6 K/UL (ref 0–1.3)
MONOCYTES NFR BLD: 5.5 %
NEUTROPHILS # BLD: 6.3 K/UL (ref 1.7–7.7)
NEUTROPHILS NFR BLD: 60 %
PLATELET # BLD AUTO: 459 K/UL (ref 135–450)
PMV BLD AUTO: 6.6 FL (ref 5–10.5)
RBC # BLD AUTO: 4.31 M/UL (ref 4–5.2)
WBC # BLD AUTO: 10.5 K/UL (ref 4–11)

## 2023-04-01 PROCEDURE — 2500000003 HC RX 250 WO HCPCS: Performed by: EMERGENCY MEDICINE

## 2023-04-01 PROCEDURE — 85025 COMPLETE CBC W/AUTO DIFF WBC: CPT

## 2023-04-01 PROCEDURE — 96375 TX/PRO/DX INJ NEW DRUG ADDON: CPT

## 2023-04-01 PROCEDURE — 80076 HEPATIC FUNCTION PANEL: CPT

## 2023-04-01 PROCEDURE — 81001 URINALYSIS AUTO W/SCOPE: CPT

## 2023-04-01 PROCEDURE — A4216 STERILE WATER/SALINE, 10 ML: HCPCS | Performed by: EMERGENCY MEDICINE

## 2023-04-01 PROCEDURE — 80048 BASIC METABOLIC PNL TOTAL CA: CPT

## 2023-04-01 PROCEDURE — 6360000002 HC RX W HCPCS: Performed by: EMERGENCY MEDICINE

## 2023-04-01 PROCEDURE — 99285 EMERGENCY DEPT VISIT HI MDM: CPT

## 2023-04-01 PROCEDURE — 2580000003 HC RX 258: Performed by: EMERGENCY MEDICINE

## 2023-04-01 PROCEDURE — 83690 ASSAY OF LIPASE: CPT

## 2023-04-01 PROCEDURE — 96374 THER/PROPH/DIAG INJ IV PUSH: CPT

## 2023-04-01 PROCEDURE — 84703 CHORIONIC GONADOTROPIN ASSAY: CPT

## 2023-04-01 RX ORDER — KETOROLAC TROMETHAMINE 30 MG/ML
30 INJECTION, SOLUTION INTRAMUSCULAR; INTRAVENOUS ONCE
Status: COMPLETED | OUTPATIENT
Start: 2023-04-01 | End: 2023-04-01

## 2023-04-01 RX ORDER — 0.9 % SODIUM CHLORIDE 0.9 %
1000 INTRAVENOUS SOLUTION INTRAVENOUS ONCE
Status: COMPLETED | OUTPATIENT
Start: 2023-04-01 | End: 2023-04-02

## 2023-04-01 RX ORDER — MORPHINE SULFATE 4 MG/ML
4 INJECTION, SOLUTION INTRAMUSCULAR; INTRAVENOUS ONCE
Status: COMPLETED | OUTPATIENT
Start: 2023-04-01 | End: 2023-04-02

## 2023-04-01 RX ORDER — ONDANSETRON 2 MG/ML
4 INJECTION INTRAMUSCULAR; INTRAVENOUS ONCE
Status: COMPLETED | OUTPATIENT
Start: 2023-04-01 | End: 2023-04-01

## 2023-04-01 RX ADMIN — FAMOTIDINE 20 MG: 10 INJECTION, SOLUTION INTRAVENOUS at 23:42

## 2023-04-01 RX ADMIN — KETOROLAC TROMETHAMINE 30 MG: 30 INJECTION, SOLUTION INTRAMUSCULAR at 23:41

## 2023-04-01 RX ADMIN — SODIUM CHLORIDE 1000 ML: 9 INJECTION, SOLUTION INTRAVENOUS at 23:40

## 2023-04-01 RX ADMIN — ONDANSETRON 4 MG: 2 INJECTION INTRAMUSCULAR; INTRAVENOUS at 23:42

## 2023-04-01 ASSESSMENT — PAIN SCALES - GENERAL: PAINLEVEL_OUTOF10: 8

## 2023-04-02 ENCOUNTER — APPOINTMENT (OUTPATIENT)
Dept: CT IMAGING | Age: 38
End: 2023-04-02
Payer: MEDICAID

## 2023-04-02 VITALS
OXYGEN SATURATION: 95 % | BODY MASS INDEX: 50.79 KG/M2 | HEIGHT: 62 IN | RESPIRATION RATE: 18 BRPM | TEMPERATURE: 98.5 F | HEART RATE: 78 BPM | SYSTOLIC BLOOD PRESSURE: 112 MMHG | WEIGHT: 276.02 LBS | DIASTOLIC BLOOD PRESSURE: 60 MMHG

## 2023-04-02 LAB
ALBUMIN SERPL-MCNC: 4 G/DL (ref 3.4–5)
ALP SERPL-CCNC: 139 U/L (ref 40–129)
ALT SERPL-CCNC: 14 U/L (ref 10–40)
ANION GAP SERPL CALCULATED.3IONS-SCNC: 14 MMOL/L (ref 3–16)
AST SERPL-CCNC: 14 U/L (ref 15–37)
BACTERIA URNS QL MICRO: ABNORMAL /HPF
BILIRUB DIRECT SERPL-MCNC: <0.2 MG/DL (ref 0–0.3)
BILIRUB INDIRECT SERPL-MCNC: ABNORMAL MG/DL (ref 0–1)
BILIRUB SERPL-MCNC: <0.2 MG/DL (ref 0–1)
BILIRUB UR QL STRIP.AUTO: NEGATIVE
BUN SERPL-MCNC: 10 MG/DL (ref 7–20)
CALCIUM SERPL-MCNC: 9.7 MG/DL (ref 8.3–10.6)
CHLORIDE SERPL-SCNC: 97 MMOL/L (ref 99–110)
CLARITY UR: ABNORMAL
CO2 SERPL-SCNC: 28 MMOL/L (ref 21–32)
COLOR UR: YELLOW
CREAT SERPL-MCNC: 1 MG/DL (ref 0.6–1.1)
EPI CELLS #/AREA URNS AUTO: 3 /HPF (ref 0–5)
GFR SERPLBLD CREATININE-BSD FMLA CKD-EPI: >60 ML/MIN/{1.73_M2}
GLUCOSE SERPL-MCNC: 116 MG/DL (ref 70–99)
GLUCOSE UR STRIP.AUTO-MCNC: NEGATIVE MG/DL
HCG SERPL QL: NEGATIVE
HGB UR QL STRIP.AUTO: ABNORMAL
HYALINE CASTS #/AREA URNS AUTO: 1 /LPF (ref 0–8)
KETONES UR STRIP.AUTO-MCNC: ABNORMAL MG/DL
LEUKOCYTE ESTERASE UR QL STRIP.AUTO: NEGATIVE
LIPASE SERPL-CCNC: 44 U/L (ref 13–60)
NITRITE UR QL STRIP.AUTO: NEGATIVE
PH UR STRIP.AUTO: 6.5 [PH] (ref 5–8)
POTASSIUM SERPL-SCNC: 3.7 MMOL/L (ref 3.5–5.1)
PROT SERPL-MCNC: 7.9 G/DL (ref 6.4–8.2)
PROT UR STRIP.AUTO-MCNC: ABNORMAL MG/DL
RBC CLUMPS #/AREA URNS AUTO: 7 /HPF (ref 0–4)
SODIUM SERPL-SCNC: 139 MMOL/L (ref 136–145)
SP GR UR STRIP.AUTO: 1.03 (ref 1–1.03)
UA COMPLETE W REFLEX CULTURE PNL UR: ABNORMAL
UA DIPSTICK W REFLEX MICRO PNL UR: YES
URN SPEC COLLECT METH UR: ABNORMAL
UROBILINOGEN UR STRIP-ACNC: 1 E.U./DL
WBC #/AREA URNS AUTO: 2 /HPF (ref 0–5)

## 2023-04-02 PROCEDURE — 6360000004 HC RX CONTRAST MEDICATION: Performed by: EMERGENCY MEDICINE

## 2023-04-02 PROCEDURE — 74177 CT ABD & PELVIS W/CONTRAST: CPT

## 2023-04-02 PROCEDURE — 6360000002 HC RX W HCPCS: Performed by: EMERGENCY MEDICINE

## 2023-04-02 RX ORDER — KETOROLAC TROMETHAMINE 10 MG/1
10 TABLET, FILM COATED ORAL EVERY 6 HOURS PRN
Qty: 12 TABLET | Refills: 0 | Status: ON HOLD | OUTPATIENT
Start: 2023-04-02 | End: 2023-04-05

## 2023-04-02 RX ADMIN — IOPAMIDOL 75 ML: 755 INJECTION, SOLUTION INTRAVENOUS at 00:37

## 2023-04-02 RX ADMIN — MORPHINE SULFATE 4 MG: 4 INJECTION INTRAVENOUS at 00:01

## 2023-04-02 ASSESSMENT — ENCOUNTER SYMPTOMS
ABDOMINAL PAIN: 1
CONSTIPATION: 0
COLOR CHANGE: 0
EYE ITCHING: 0
SHORTNESS OF BREATH: 0
EYE DISCHARGE: 0
COUGH: 0
VOMITING: 0

## 2023-04-02 ASSESSMENT — PAIN SCALES - GENERAL
PAINLEVEL_OUTOF10: 8
PAINLEVEL_OUTOF10: 3

## 2023-04-02 NOTE — ED NOTES
Discharge and education instructions reviewed. Patient verbalized understanding, teach-back successful. Patient denied questions at this time. No acute distress noted. Patient instructed to follow-up as noted - return to emergency department if symptoms worsen. Patient verbalized understanding. Discharged per EDMD with discharge instructions.         Skye Lee RN  04/02/23 9292

## 2023-04-02 NOTE — ED PROVIDER NOTES
UA 1+ (*)     RBC, UA 7 (*)     All other components within normal limits   LIPASE   HCG, SERUM, QUALITATIVE       All other labs were withinnormal range or not returned as of this dictation. EMERGENCY DEPARTMENT COURSE and DIFFERENTIAL DIAGNOSIS/MDM:     PMH, Surgical Hx, FH, Social Hx reviewed by myself (ETOH usage, Tobacco usage, Drug usage reviewed by myself, no pertinent Hx)- No Pertinent Hx     Old records were reviewed by me     MDM 42-year-old with right upper quadrant abdominal pain. Found to have gallstones. Biliary colic. Pain well controlled. Tolerating p.o. General surgery follow-up outpatient. DDX-   Social Determinants (Poverty, Education, uninsured) -   Prior notes-   Name and route all medications-   Orders Placed This Encounter   Medications    famotidine (PEPCID) 20 mg in sodium chloride (PF) 0.9 % 10 mL injection    ondansetron (ZOFRAN) injection 4 mg    morphine (PF) injection 4 mg    ketorolac (TORADOL) injection 30 mg    0.9 % sodium chloride bolus    iopamidol (ISOVUE-370) 76 % injection 75 mL    ketorolac (TORADOL) 10 MG tablet     Sig: Take 1 tablet by mouth every 6 hours as needed for Pain     Dispense:  12 tablet     Refill:  0       Charting interpretations all by myself-as above  Diagnosis descriptions-mild pain  Consults-   Disposition- Considered -   I estimate there is LOW risk for Sepsis, MI, Stroke, Tamponade, PTX, Toxicity or other life threatening etiology thus I consider the discharge disposition reasonable. The patient is at low risk for mortality based on demographic, history and clinical factors. Given the best available information and clinical assessment, I estimate the risk of hospitalization to be greater than risk of treatment at home. I have explained to the patient that the risk could rapidly change, given precautions for return and instructions. Explained to patient that the risk for mortality is low based on demographic, history and clinical factors.

## 2023-04-03 ENCOUNTER — OFFICE VISIT (OUTPATIENT)
Dept: SURGERY | Age: 38
End: 2023-04-03
Payer: MEDICAID

## 2023-04-03 VITALS
BODY MASS INDEX: 50.79 KG/M2 | WEIGHT: 276 LBS | HEIGHT: 62 IN | SYSTOLIC BLOOD PRESSURE: 130 MMHG | DIASTOLIC BLOOD PRESSURE: 70 MMHG

## 2023-04-03 DIAGNOSIS — K80.50 BILIARY COLIC: Primary | ICD-10-CM

## 2023-04-03 PROCEDURE — 99204 OFFICE O/P NEW MOD 45 MIN: CPT | Performed by: SURGERY

## 2023-04-03 PROCEDURE — G8427 DOCREV CUR MEDS BY ELIG CLIN: HCPCS | Performed by: SURGERY

## 2023-04-03 PROCEDURE — 4004F PT TOBACCO SCREEN RCVD TLK: CPT | Performed by: SURGERY

## 2023-04-03 PROCEDURE — G8417 CALC BMI ABV UP PARAM F/U: HCPCS | Performed by: SURGERY

## 2023-04-03 RX ORDER — FERROUS SULFATE 325(65) MG
325 TABLET ORAL DAILY
COMMUNITY
Start: 2023-04-01

## 2023-04-03 RX ORDER — HYDROXYZINE 50 MG/1
50 TABLET, FILM COATED ORAL EVERY 8 HOURS PRN
COMMUNITY
Start: 2023-04-03

## 2023-04-03 ASSESSMENT — ENCOUNTER SYMPTOMS: ABDOMINAL PAIN: 1

## 2023-04-03 NOTE — PROGRESS NOTES
Content: Thought content normal.   Vitals    Last recorded: 04/03 1117     BP:  130/70     Height:  5' 2\" (1.575 m)     Weight:  276 lb (125.2 kg)          Electronically signed by Geetha Hyman MD on 4/3/2023 at 1:20 PM        An electronic signature was used to authenticate this note.     --Geetha Hyman MD

## 2023-04-03 NOTE — PATIENT INSTRUCTIONS
Martha merino scheduled for 4/4/23 at 7:30 arrive at 6. Nothing to eat or midnight. You will need someone to bring you home.

## 2023-04-04 ENCOUNTER — APPOINTMENT (OUTPATIENT)
Dept: GENERAL RADIOLOGY | Age: 38
End: 2023-04-04
Attending: SURGERY
Payer: MEDICAID

## 2023-04-04 ENCOUNTER — HOSPITAL ENCOUNTER (INPATIENT)
Age: 38
LOS: 2 days | Discharge: HOME | End: 2023-04-06
Attending: SURGERY | Admitting: SURGERY
Payer: MEDICAID

## 2023-04-04 ENCOUNTER — ANESTHESIA EVENT (OUTPATIENT)
Dept: OPERATING ROOM | Age: 38
End: 2023-04-04
Payer: MEDICAID

## 2023-04-04 ENCOUNTER — ANESTHESIA (OUTPATIENT)
Dept: OPERATING ROOM | Age: 38
End: 2023-04-04
Payer: MEDICAID

## 2023-04-04 DIAGNOSIS — K80.20 GALLSTONES: ICD-10-CM

## 2023-04-04 PROBLEM — K80.50 CHOLEDOCHOLITHIASIS: Status: ACTIVE | Noted: 2023-04-04

## 2023-04-04 PROCEDURE — 2500000003 HC RX 250 WO HCPCS: Performed by: ANESTHESIOLOGY

## 2023-04-04 PROCEDURE — 94761 N-INVAS EAR/PLS OXIMETRY MLT: CPT

## 2023-04-04 PROCEDURE — A4217 STERILE WATER/SALINE, 500 ML: HCPCS | Performed by: SURGERY

## 2023-04-04 PROCEDURE — 3600000004 HC SURGERY LEVEL 4 BASE: Performed by: SURGERY

## 2023-04-04 PROCEDURE — 2709999900 HC NON-CHARGEABLE SUPPLY: Performed by: SURGERY

## 2023-04-04 PROCEDURE — 6360000002 HC RX W HCPCS: Performed by: SURGERY

## 2023-04-04 PROCEDURE — 3600000014 HC SURGERY LEVEL 4 ADDTL 15MIN: Performed by: SURGERY

## 2023-04-04 PROCEDURE — 6360000002 HC RX W HCPCS: Performed by: ANESTHESIOLOGY

## 2023-04-04 PROCEDURE — 6370000000 HC RX 637 (ALT 250 FOR IP): Performed by: ANESTHESIOLOGY

## 2023-04-04 PROCEDURE — 2500000003 HC RX 250 WO HCPCS: Performed by: SURGERY

## 2023-04-04 PROCEDURE — 2720000010 HC SURG SUPPLY STERILE: Performed by: SURGERY

## 2023-04-04 PROCEDURE — 3700000001 HC ADD 15 MINUTES (ANESTHESIA): Performed by: SURGERY

## 2023-04-04 PROCEDURE — 7100000000 HC PACU RECOVERY - FIRST 15 MIN: Performed by: SURGERY

## 2023-04-04 PROCEDURE — 74300 X-RAY BILE DUCTS/PANCREAS: CPT

## 2023-04-04 PROCEDURE — 88304 TISSUE EXAM BY PATHOLOGIST: CPT

## 2023-04-04 PROCEDURE — 47563 LAPARO CHOLECYSTECTOMY/GRAPH: CPT | Performed by: SURGERY

## 2023-04-04 PROCEDURE — 7100000001 HC PACU RECOVERY - ADDTL 15 MIN: Performed by: SURGERY

## 2023-04-04 PROCEDURE — 2580000003 HC RX 258: Performed by: SURGERY

## 2023-04-04 PROCEDURE — 2700000000 HC OXYGEN THERAPY PER DAY

## 2023-04-04 PROCEDURE — 3700000000 HC ANESTHESIA ATTENDED CARE: Performed by: SURGERY

## 2023-04-04 PROCEDURE — 2580000003 HC RX 258: Performed by: ANESTHESIOLOGY

## 2023-04-04 PROCEDURE — 2060000000 HC ICU INTERMEDIATE R&B

## 2023-04-04 PROCEDURE — 6360000004 HC RX CONTRAST MEDICATION: Performed by: SURGERY

## 2023-04-04 RX ORDER — OXYCODONE HYDROCHLORIDE 10 MG/1
10 TABLET ORAL PRN
Status: COMPLETED | OUTPATIENT
Start: 2023-04-04 | End: 2023-04-04

## 2023-04-04 RX ORDER — OXYCODONE HYDROCHLORIDE 5 MG/1
5 TABLET ORAL PRN
Status: COMPLETED | OUTPATIENT
Start: 2023-04-04 | End: 2023-04-04

## 2023-04-04 RX ORDER — SODIUM CHLORIDE 0.9 % (FLUSH) 0.9 %
5-40 SYRINGE (ML) INJECTION PRN
Status: DISCONTINUED | OUTPATIENT
Start: 2023-04-04 | End: 2023-04-05 | Stop reason: SDUPTHER

## 2023-04-04 RX ORDER — LIDOCAINE HYDROCHLORIDE 20 MG/ML
INJECTION, SOLUTION EPIDURAL; INFILTRATION; INTRACAUDAL; PERINEURAL PRN
Status: DISCONTINUED | OUTPATIENT
Start: 2023-04-04 | End: 2023-04-04 | Stop reason: SDUPTHER

## 2023-04-04 RX ORDER — LABETALOL HYDROCHLORIDE 5 MG/ML
INJECTION, SOLUTION INTRAVENOUS PRN
Status: DISCONTINUED | OUTPATIENT
Start: 2023-04-04 | End: 2023-04-04 | Stop reason: SDUPTHER

## 2023-04-04 RX ORDER — LABETALOL HYDROCHLORIDE 5 MG/ML
5 INJECTION, SOLUTION INTRAVENOUS ONCE
Status: COMPLETED | OUTPATIENT
Start: 2023-04-04 | End: 2023-04-04

## 2023-04-04 RX ORDER — MIDAZOLAM HYDROCHLORIDE 1 MG/ML
INJECTION INTRAMUSCULAR; INTRAVENOUS PRN
Status: DISCONTINUED | OUTPATIENT
Start: 2023-04-04 | End: 2023-04-04 | Stop reason: SDUPTHER

## 2023-04-04 RX ORDER — SODIUM CHLORIDE 9 MG/ML
INJECTION, SOLUTION INTRAVENOUS CONTINUOUS
Status: DISCONTINUED | OUTPATIENT
Start: 2023-04-04 | End: 2023-04-06 | Stop reason: HOSPADM

## 2023-04-04 RX ORDER — PROPOFOL 10 MG/ML
INJECTION, EMULSION INTRAVENOUS PRN
Status: DISCONTINUED | OUTPATIENT
Start: 2023-04-04 | End: 2023-04-04 | Stop reason: SDUPTHER

## 2023-04-04 RX ORDER — SODIUM CHLORIDE 9 MG/ML
INJECTION, SOLUTION INTRAVENOUS PRN
Status: DISCONTINUED | OUTPATIENT
Start: 2023-04-04 | End: 2023-04-05 | Stop reason: SDUPTHER

## 2023-04-04 RX ORDER — CIPROFLOXACIN 2 MG/ML
400 INJECTION, SOLUTION INTRAVENOUS EVERY 12 HOURS
Status: DISCONTINUED | OUTPATIENT
Start: 2023-04-04 | End: 2023-04-06 | Stop reason: HOSPADM

## 2023-04-04 RX ORDER — MORPHINE SULFATE 4 MG/ML
4 INJECTION, SOLUTION INTRAMUSCULAR; INTRAVENOUS
Status: DISCONTINUED | OUTPATIENT
Start: 2023-04-04 | End: 2023-04-06 | Stop reason: HOSPADM

## 2023-04-04 RX ORDER — MAGNESIUM HYDROXIDE 1200 MG/15ML
LIQUID ORAL CONTINUOUS PRN
Status: COMPLETED | OUTPATIENT
Start: 2023-04-04 | End: 2023-04-04

## 2023-04-04 RX ORDER — ACETAMINOPHEN 325 MG/1
650 TABLET ORAL EVERY 6 HOURS PRN
Status: DISCONTINUED | OUTPATIENT
Start: 2023-04-04 | End: 2023-04-06 | Stop reason: HOSPADM

## 2023-04-04 RX ORDER — ONDANSETRON 2 MG/ML
INJECTION INTRAMUSCULAR; INTRAVENOUS PRN
Status: DISCONTINUED | OUTPATIENT
Start: 2023-04-04 | End: 2023-04-04 | Stop reason: SDUPTHER

## 2023-04-04 RX ORDER — SODIUM CHLORIDE 9 MG/ML
INJECTION, SOLUTION INTRAVENOUS PRN
Status: DISCONTINUED | OUTPATIENT
Start: 2023-04-04 | End: 2023-04-04 | Stop reason: HOSPADM

## 2023-04-04 RX ORDER — ONDANSETRON 2 MG/ML
4 INJECTION INTRAMUSCULAR; INTRAVENOUS
Status: COMPLETED | OUTPATIENT
Start: 2023-04-04 | End: 2023-04-04

## 2023-04-04 RX ORDER — FENTANYL CITRATE 50 UG/ML
INJECTION, SOLUTION INTRAMUSCULAR; INTRAVENOUS PRN
Status: DISCONTINUED | OUTPATIENT
Start: 2023-04-04 | End: 2023-04-04 | Stop reason: SDUPTHER

## 2023-04-04 RX ORDER — SUCCINYLCHOLINE/SOD CL,ISO/PF 200MG/10ML
SYRINGE (ML) INTRAVENOUS PRN
Status: DISCONTINUED | OUTPATIENT
Start: 2023-04-04 | End: 2023-04-04 | Stop reason: SDUPTHER

## 2023-04-04 RX ORDER — ENOXAPARIN SODIUM 100 MG/ML
30 INJECTION SUBCUTANEOUS 2 TIMES DAILY
Status: DISCONTINUED | OUTPATIENT
Start: 2023-04-04 | End: 2023-04-06 | Stop reason: HOSPADM

## 2023-04-04 RX ORDER — ONDANSETRON 2 MG/ML
4 INJECTION INTRAMUSCULAR; INTRAVENOUS EVERY 6 HOURS PRN
Status: DISCONTINUED | OUTPATIENT
Start: 2023-04-04 | End: 2023-04-06 | Stop reason: HOSPADM

## 2023-04-04 RX ORDER — SODIUM CHLORIDE 0.9 % (FLUSH) 0.9 %
5-40 SYRINGE (ML) INJECTION PRN
Status: DISCONTINUED | OUTPATIENT
Start: 2023-04-04 | End: 2023-04-04 | Stop reason: HOSPADM

## 2023-04-04 RX ORDER — ONDANSETRON 4 MG/1
4 TABLET, ORALLY DISINTEGRATING ORAL EVERY 8 HOURS PRN
Status: DISCONTINUED | OUTPATIENT
Start: 2023-04-04 | End: 2023-04-06 | Stop reason: HOSPADM

## 2023-04-04 RX ORDER — BUPIVACAINE HYDROCHLORIDE 5 MG/ML
INJECTION, SOLUTION EPIDURAL; INTRACAUDAL
Status: COMPLETED | OUTPATIENT
Start: 2023-04-04 | End: 2023-04-04

## 2023-04-04 RX ORDER — SODIUM CHLORIDE 0.9 % (FLUSH) 0.9 %
5-40 SYRINGE (ML) INJECTION EVERY 12 HOURS SCHEDULED
Status: DISCONTINUED | OUTPATIENT
Start: 2023-04-04 | End: 2023-04-04 | Stop reason: HOSPADM

## 2023-04-04 RX ORDER — POLYETHYLENE GLYCOL 3350 17 G/17G
17 POWDER, FOR SOLUTION ORAL DAILY PRN
Status: DISCONTINUED | OUTPATIENT
Start: 2023-04-04 | End: 2023-04-06 | Stop reason: HOSPADM

## 2023-04-04 RX ORDER — SODIUM CHLORIDE 0.9 % (FLUSH) 0.9 %
5-40 SYRINGE (ML) INJECTION EVERY 12 HOURS SCHEDULED
Status: DISCONTINUED | OUTPATIENT
Start: 2023-04-04 | End: 2023-04-05 | Stop reason: SDUPTHER

## 2023-04-04 RX ORDER — HEPARIN SODIUM 5000 [USP'U]/ML
5000 INJECTION, SOLUTION INTRAVENOUS; SUBCUTANEOUS ONCE
Status: COMPLETED | OUTPATIENT
Start: 2023-04-04 | End: 2023-04-04

## 2023-04-04 RX ORDER — ROCURONIUM BROMIDE 10 MG/ML
INJECTION, SOLUTION INTRAVENOUS PRN
Status: DISCONTINUED | OUTPATIENT
Start: 2023-04-04 | End: 2023-04-04 | Stop reason: SDUPTHER

## 2023-04-04 RX ORDER — ACETAMINOPHEN 650 MG/1
650 SUPPOSITORY RECTAL EVERY 6 HOURS PRN
Status: DISCONTINUED | OUTPATIENT
Start: 2023-04-04 | End: 2023-04-06 | Stop reason: HOSPADM

## 2023-04-04 RX ORDER — MORPHINE SULFATE 2 MG/ML
2 INJECTION, SOLUTION INTRAMUSCULAR; INTRAVENOUS
Status: DISCONTINUED | OUTPATIENT
Start: 2023-04-04 | End: 2023-04-06 | Stop reason: HOSPADM

## 2023-04-04 RX ORDER — DEXAMETHASONE SODIUM PHOSPHATE 4 MG/ML
INJECTION, SOLUTION INTRA-ARTICULAR; INTRALESIONAL; INTRAMUSCULAR; INTRAVENOUS; SOFT TISSUE PRN
Status: DISCONTINUED | OUTPATIENT
Start: 2023-04-04 | End: 2023-04-04 | Stop reason: SDUPTHER

## 2023-04-04 RX ORDER — CIPROFLOXACIN 2 MG/ML
400 INJECTION, SOLUTION INTRAVENOUS ONCE
Status: COMPLETED | OUTPATIENT
Start: 2023-04-04 | End: 2023-04-04

## 2023-04-04 RX ADMIN — HEPARIN SODIUM 5000 UNITS: 5000 INJECTION INTRAVENOUS; SUBCUTANEOUS at 06:44

## 2023-04-04 RX ADMIN — SODIUM CHLORIDE: 9 INJECTION, SOLUTION INTRAVENOUS at 06:58

## 2023-04-04 RX ADMIN — FENTANYL CITRATE 100 MCG: 50 INJECTION INTRAMUSCULAR; INTRAVENOUS at 07:34

## 2023-04-04 RX ADMIN — LABETALOL HYDROCHLORIDE 2.5 MG: 5 INJECTION, SOLUTION INTRAVENOUS at 08:01

## 2023-04-04 RX ADMIN — PROPOFOL 50 MG: 10 INJECTION, EMULSION INTRAVENOUS at 07:56

## 2023-04-04 RX ADMIN — ONDANSETRON 4 MG: 2 INJECTION INTRAMUSCULAR; INTRAVENOUS at 07:41

## 2023-04-04 RX ADMIN — LABETALOL HYDROCHLORIDE 5 MG: 5 INJECTION INTRAVENOUS at 11:06

## 2023-04-04 RX ADMIN — MORPHINE SULFATE 4 MG: 4 INJECTION, SOLUTION INTRAMUSCULAR; INTRAVENOUS at 20:25

## 2023-04-04 RX ADMIN — ROCURONIUM BROMIDE 40 MG: 10 INJECTION INTRAVENOUS at 07:41

## 2023-04-04 RX ADMIN — LABETALOL HYDROCHLORIDE 2.5 MG: 5 INJECTION, SOLUTION INTRAVENOUS at 07:59

## 2023-04-04 RX ADMIN — LIDOCAINE HYDROCHLORIDE 100 MG: 20 INJECTION, SOLUTION EPIDURAL; INFILTRATION; INTRACAUDAL; PERINEURAL at 07:34

## 2023-04-04 RX ADMIN — LABETALOL HYDROCHLORIDE 2.5 MG: 5 INJECTION, SOLUTION INTRAVENOUS at 08:06

## 2023-04-04 RX ADMIN — HYDROMORPHONE HYDROCHLORIDE 0.25 MG: 1 INJECTION, SOLUTION INTRAMUSCULAR; INTRAVENOUS; SUBCUTANEOUS at 10:41

## 2023-04-04 RX ADMIN — CIPROFLOXACIN 400 MG: 2 INJECTION, SOLUTION INTRAVENOUS at 20:25

## 2023-04-04 RX ADMIN — LABETALOL HYDROCHLORIDE 2.5 MG: 5 INJECTION, SOLUTION INTRAVENOUS at 08:10

## 2023-04-04 RX ADMIN — Medication 120 MG: at 07:34

## 2023-04-04 RX ADMIN — HYDROMORPHONE HYDROCHLORIDE 0.5 MG: 1 INJECTION, SOLUTION INTRAMUSCULAR; INTRAVENOUS; SUBCUTANEOUS at 10:27

## 2023-04-04 RX ADMIN — MORPHINE SULFATE 2 MG: 2 INJECTION, SOLUTION INTRAMUSCULAR; INTRAVENOUS at 17:33

## 2023-04-04 RX ADMIN — HYDROMORPHONE HYDROCHLORIDE 0.5 MG: 1 INJECTION, SOLUTION INTRAMUSCULAR; INTRAVENOUS; SUBCUTANEOUS at 09:15

## 2023-04-04 RX ADMIN — ONDANSETRON 4 MG: 2 INJECTION INTRAMUSCULAR; INTRAVENOUS at 20:25

## 2023-04-04 RX ADMIN — SODIUM CHLORIDE: 9 INJECTION, SOLUTION INTRAVENOUS at 12:14

## 2023-04-04 RX ADMIN — MIDAZOLAM 2 MG: 1 INJECTION INTRAMUSCULAR; INTRAVENOUS at 07:31

## 2023-04-04 RX ADMIN — SUGAMMADEX 200 MG: 100 INJECTION, SOLUTION INTRAVENOUS at 09:13

## 2023-04-04 RX ADMIN — HYDROMORPHONE HYDROCHLORIDE 0.5 MG: 1 INJECTION, SOLUTION INTRAMUSCULAR; INTRAVENOUS; SUBCUTANEOUS at 07:50

## 2023-04-04 RX ADMIN — PROPOFOL 200 MG: 10 INJECTION, EMULSION INTRAVENOUS at 07:34

## 2023-04-04 RX ADMIN — ROCURONIUM BROMIDE 10 MG: 10 INJECTION INTRAVENOUS at 08:56

## 2023-04-04 RX ADMIN — LABETALOL HYDROCHLORIDE 5 MG: 5 INJECTION INTRAVENOUS at 11:26

## 2023-04-04 RX ADMIN — OXYCODONE HYDROCHLORIDE 10 MG: 10 TABLET ORAL at 11:38

## 2023-04-04 RX ADMIN — ENOXAPARIN SODIUM 30 MG: 100 INJECTION SUBCUTANEOUS at 12:27

## 2023-04-04 RX ADMIN — HYDROMORPHONE HYDROCHLORIDE 0.5 MG: 1 INJECTION, SOLUTION INTRAMUSCULAR; INTRAVENOUS; SUBCUTANEOUS at 10:09

## 2023-04-04 RX ADMIN — ROCURONIUM BROMIDE 10 MG: 10 INJECTION INTRAVENOUS at 07:34

## 2023-04-04 RX ADMIN — HYDROMORPHONE HYDROCHLORIDE 0.25 MG: 1 INJECTION, SOLUTION INTRAMUSCULAR; INTRAVENOUS; SUBCUTANEOUS at 10:58

## 2023-04-04 RX ADMIN — ONDANSETRON 4 MG: 2 INJECTION INTRAMUSCULAR; INTRAVENOUS at 10:40

## 2023-04-04 RX ADMIN — MORPHINE SULFATE 2 MG: 2 INJECTION, SOLUTION INTRAMUSCULAR; INTRAVENOUS at 12:27

## 2023-04-04 RX ADMIN — HYDROMORPHONE HYDROCHLORIDE 0.5 MG: 1 INJECTION, SOLUTION INTRAMUSCULAR; INTRAVENOUS; SUBCUTANEOUS at 10:18

## 2023-04-04 RX ADMIN — LABETALOL HYDROCHLORIDE 10 MG: 5 INJECTION, SOLUTION INTRAVENOUS at 08:36

## 2023-04-04 RX ADMIN — CIPROFLOXACIN 400 MG: 2 INJECTION, SOLUTION INTRAVENOUS at 07:41

## 2023-04-04 RX ADMIN — DEXAMETHASONE SODIUM PHOSPHATE 10 MG: 4 INJECTION, SOLUTION INTRAMUSCULAR; INTRAVENOUS at 07:41

## 2023-04-04 ASSESSMENT — PAIN DESCRIPTION - PAIN TYPE
TYPE: SURGICAL PAIN

## 2023-04-04 ASSESSMENT — PAIN DESCRIPTION - ORIENTATION
ORIENTATION: MID
ORIENTATION: RIGHT;MID
ORIENTATION: MID

## 2023-04-04 ASSESSMENT — PAIN DESCRIPTION - FREQUENCY
FREQUENCY: CONTINUOUS

## 2023-04-04 ASSESSMENT — PAIN DESCRIPTION - DESCRIPTORS
DESCRIPTORS: ACHING
DESCRIPTORS: ACHING;TENDER
DESCRIPTORS: ACHING;DISCOMFORT
DESCRIPTORS: ACHING

## 2023-04-04 ASSESSMENT — PAIN SCALES - GENERAL
PAINLEVEL_OUTOF10: 3
PAINLEVEL_OUTOF10: 7
PAINLEVEL_OUTOF10: 5
PAINLEVEL_OUTOF10: 4
PAINLEVEL_OUTOF10: 5
PAINLEVEL_OUTOF10: 3
PAINLEVEL_OUTOF10: 7
PAINLEVEL_OUTOF10: 7
PAINLEVEL_OUTOF10: 4

## 2023-04-04 ASSESSMENT — PAIN DESCRIPTION - ONSET
ONSET: ON-GOING

## 2023-04-04 ASSESSMENT — PAIN - FUNCTIONAL ASSESSMENT
PAIN_FUNCTIONAL_ASSESSMENT: PREVENTS OR INTERFERES SOME ACTIVE ACTIVITIES AND ADLS
PAIN_FUNCTIONAL_ASSESSMENT: ACTIVITIES ARE NOT PREVENTED
PAIN_FUNCTIONAL_ASSESSMENT: 0-10
PAIN_FUNCTIONAL_ASSESSMENT: PREVENTS OR INTERFERES SOME ACTIVE ACTIVITIES AND ADLS

## 2023-04-04 ASSESSMENT — PAIN DESCRIPTION - LOCATION
LOCATION: ABDOMEN

## 2023-04-04 ASSESSMENT — ENCOUNTER SYMPTOMS: SHORTNESS OF BREATH: 0

## 2023-04-04 NOTE — CONSULTS
AST 14*   ALT 14   BILIDIR <0.2   BILITOT <0.2   ALKPHOS 139*     Recent Labs     04/01/23  2329   LIPASE 44.0     No results for input(s): PROTIME, INR in the last 72 hours. Imaging  FL CHOLANGIOGRAM OR   Final Result   Focal apparent elliptical filling defect within the distal common bile duct,   just proximal to the ampullary insertion, with mild upstream dilatation of   the common bile duct. This could represent an injected gas bubble. However,   a distal common bile duct stone or mass is also a consideration. Recommend   correlation with intraoperative findings, and if concerning, recommend   further characterization with an MRCP or ERCP study. ASSESSMENT AND RECOMMENDATIONS   44 y.o. female with a PMH of bipolar disorder, obesity , c-sections who presented on 4/4/2023 with post prandial RUQ abdominal pain. IMPRESSION:  Symptomatic cholelithiasis, biliary colic s/p laparoscopic cholecystectomy   Suspected choledocholithiasis with filling defects noted on IOC      RECOMMENDATIONS:    ERCP with sphincterotomy and stent placement tomorrow  Ok for clear liquids today  NPO after MN    If you have any questions or need any further information, please feel free to contact our consult team.  Thank you for allowing us to participate in the care of [address]. The note was completed using Dragon voice recognition transcription. Every effort was made to ensure accuracy; however, inadvertent transcription errors may be present despite my best efforts to edit errors.       Jaya Eid PA-C

## 2023-04-04 NOTE — ANESTHESIA POSTPROCEDURE EVALUATION
13.1                04/01/2023 11:29 PM        HCT                      38.2                04/01/2023 11:29 PM        PLT                      459 (H)             04/01/2023 11:29 PM   RENAL  Lab Results       Component                Value               Date/Time                  NA                       139                 04/01/2023 11:29 PM        K                        3.7                 04/01/2023 11:29 PM        CL                       97 (L)              04/01/2023 11:29 PM        CO2                      28                  04/01/2023 11:29 PM        BUN                      10                  04/01/2023 11:29 PM        CREATININE               1.0                 04/01/2023 11:29 PM        GLUCOSE                  116 (H)             04/01/2023 11:29 PM   COAGS  No results found for: PROTIME, INR, APTT    Intake & Output:  @92KCHJ@    Nausea & Vomiting:  No    Level of Consciousness:  Awake    Pain Assessment:  Adequate analgesia    Anesthesia Complications:  No apparent anesthetic complications    SUMMARY      Vital signs stable  OK to discharge from Stage I post anesthesia care.   Care transferred from Anesthesiology department on discharge from perioperative area

## 2023-04-04 NOTE — LETTER
[address]  Phone: [phone]             April 6, 2023    Patient: Maria Del Carmen Aguirre   YOB: 1985   Date of Visit: 4/4/2023       To Whom It May Concern:    Conner Chiang was seen and treated in our facility beginning 4/4/2023 until 4/6/2023. She may return to work on 4/7/2023 .       Sincerely,               Signature:__________________________________

## 2023-04-04 NOTE — ANESTHESIA PRE PROCEDURE
tablet Take 1 tablet by mouth every 6 hours as needed for Pain (Patient not taking: Reported on 4/3/2023) 12 tablet 0    levothyroxine (SYNTHROID) 50 MCG tablet TAKE 1 TABLET BY MOUTH DAILY      JUNEL FE 24 1-20 MG-MCG(24) TABS TAKE 1 TABLET BY MOUTH DAILY FOR BIRTH CONTROL      folic acid (FOLVITE) 1 MG tablet TAKE 1 TABLET BY MOUTH DAILY      docusate sodium (COLACE) 100 MG capsule Take 1 capsule by mouth 2 times daily      amphetamine-dextroamphetamine (ADDERALL) 10 MG tablet Take 1 tablet by mouth 2 times daily.       SUMAtriptan (IMITREX) 50 MG tablet Take 1 tablet by mouth once as needed      pantoprazole (PROTONIX) 20 MG tablet Take 1 tablet by mouth daily (Patient taking differently: Take 2 tablets by mouth daily) 30 tablet 0     Current Facility-Administered Medications   Medication Dose Route Frequency Provider Last Rate Last Admin    ciprofloxacin (CIPRO) IVPB 400 mg  400 mg IntraVENous Once Emelia Gunter MD        sodium chloride flush 0.9 % injection 5-40 mL  5-40 mL IntraVENous 2 times per day Marvin Horton MD        sodium chloride flush 0.9 % injection 5-40 mL  5-40 mL IntraVENous PRN Marvin Horton MD        0.9 % sodium chloride infusion   IntraVENous PRN Marvin Horton MD         Vital Signs (Current)   Vitals:    23 1436 23 0615 23   BP:    133/72   Pulse:    80   Resp:   18    Temp:    97.5 °F (36.4 °C)   TempSrc:    Temporal   SpO2:   97%    Weight: 265 lb (120.2 kg) 265 lb (120.2 kg) 265 lb (120.2 kg)    Height: 5' 2\" (1.575 m)  5' (1.524 m)                                             Vital Signs Statistics (for past 48 hrs)     Temp  Av.5 °F (36.4 °C)  Min: 97.5 °F (36.4 °C)   Min taken time: 23  Max: 97.5 °F (36.4 °C)   Max taken time: 23  Pulse  Av  Min: [de-identified]   Min taken time: 23  Max: [de-identified]   Max taken time: 23 0623  Resp  Av  Min: 25   Min taken time: 23 4669  Max: 18   Max taken time:

## 2023-04-04 NOTE — BRIEF OP NOTE
Brief Postoperative Note      Patient: James Stover  YOB: 1985  MRN: [id number]    Date of Procedure: 4/4/2023    Pre-Op Diagnosis: biliary colic    Post-Op Diagnosis: Same with chronic cholecystitis, hydrops and choledocholithiasis       Procedure(s):  LAPAROSCOPIC CHOLECYSTECTOMY WITH CHOLANGIOGRAM    Surgeon(s):  Renny Alonso MD    Assistant:  Surgical Assistant: Lee Zaragoza RN    Anesthesia: General    Estimated Blood Loss (mL): less than 50     Complications: None    Specimens:   ID Type Source Tests Collected by Time Destination   A : A) GALLBLADDER Tissue Gallbladder SURGICAL PATHOLOGY Renny Alonso MD 4/4/2023 0163        Implants:  * No implants in log *      Drains:   Closed/Suction Drain Right Abdomen Bulb (Active)       Findings: inflamed gallbladder with hydrops, distal CBD without obstruction    Electronically signed by Ela Miller MD on 4/4/2023 at 9:17 AM

## 2023-04-05 ENCOUNTER — ANESTHESIA (OUTPATIENT)
Dept: ENDOSCOPY | Age: 38
End: 2023-04-05
Payer: MEDICAID

## 2023-04-05 ENCOUNTER — APPOINTMENT (OUTPATIENT)
Dept: GENERAL RADIOLOGY | Age: 38
End: 2023-04-05
Attending: SURGERY
Payer: MEDICAID

## 2023-04-05 ENCOUNTER — ANESTHESIA EVENT (OUTPATIENT)
Dept: ENDOSCOPY | Age: 38
End: 2023-04-05
Payer: MEDICAID

## 2023-04-05 LAB
ALBUMIN SERPL-MCNC: 3.6 G/DL (ref 3.4–5)
ALBUMIN/GLOB SERPL: 1.2 {RATIO} (ref 1.1–2.2)
ALP SERPL-CCNC: 132 U/L (ref 40–129)
ALT SERPL-CCNC: 53 U/L (ref 10–40)
ANION GAP SERPL CALCULATED.3IONS-SCNC: 10 MMOL/L (ref 3–16)
AST SERPL-CCNC: 58 U/L (ref 15–37)
BASOPHILS # BLD: 0.1 K/UL (ref 0–0.2)
BASOPHILS NFR BLD: 0.5 %
BILIRUB SERPL-MCNC: 0.3 MG/DL (ref 0–1)
BUN SERPL-MCNC: 6 MG/DL (ref 7–20)
CALCIUM SERPL-MCNC: 8.4 MG/DL (ref 8.3–10.6)
CHLORIDE SERPL-SCNC: 103 MMOL/L (ref 99–110)
CO2 SERPL-SCNC: 26 MMOL/L (ref 21–32)
CREAT SERPL-MCNC: 0.7 MG/DL (ref 0.6–1.1)
DEPRECATED RDW RBC AUTO: 14.9 % (ref 12.4–15.4)
EOSINOPHIL # BLD: 0 K/UL (ref 0–0.6)
EOSINOPHIL NFR BLD: 0 %
GFR SERPLBLD CREATININE-BSD FMLA CKD-EPI: >60 ML/MIN/{1.73_M2}
GLUCOSE SERPL-MCNC: 138 MG/DL (ref 70–99)
HCT VFR BLD AUTO: 32.1 % (ref 36–48)
HGB BLD-MCNC: 10.7 G/DL (ref 12–16)
LYMPHOCYTES # BLD: 1.9 K/UL (ref 1–5.1)
LYMPHOCYTES NFR BLD: 13.1 %
MCH RBC QN AUTO: 29.7 PG (ref 26–34)
MCHC RBC AUTO-ENTMCNC: 33.4 G/DL (ref 31–36)
MCV RBC AUTO: 88.7 FL (ref 80–100)
MONOCYTES # BLD: 0.9 K/UL (ref 0–1.3)
MONOCYTES NFR BLD: 6.3 %
NEUTROPHILS # BLD: 11.3 K/UL (ref 1.7–7.7)
NEUTROPHILS NFR BLD: 80.1 %
PLATELET # BLD AUTO: 445 K/UL (ref 135–450)
PMV BLD AUTO: 6.3 FL (ref 5–10.5)
POTASSIUM SERPL-SCNC: 4.6 MMOL/L (ref 3.5–5.1)
PROT SERPL-MCNC: 6.6 G/DL (ref 6.4–8.2)
RBC # BLD AUTO: 3.62 M/UL (ref 4–5.2)
SODIUM SERPL-SCNC: 139 MMOL/L (ref 136–145)
WBC # BLD AUTO: 14.1 K/UL (ref 4–11)

## 2023-04-05 PROCEDURE — 2500000003 HC RX 250 WO HCPCS

## 2023-04-05 PROCEDURE — 85025 COMPLETE CBC W/AUTO DIFF WBC: CPT

## 2023-04-05 PROCEDURE — 6370000000 HC RX 637 (ALT 250 FOR IP): Performed by: INTERNAL MEDICINE

## 2023-04-05 PROCEDURE — C2625 STENT, NON-COR, TEM W/DEL SY: HCPCS | Performed by: INTERNAL MEDICINE

## 2023-04-05 PROCEDURE — 6360000002 HC RX W HCPCS

## 2023-04-05 PROCEDURE — 7100000001 HC PACU RECOVERY - ADDTL 15 MIN: Performed by: INTERNAL MEDICINE

## 2023-04-05 PROCEDURE — 3609015100 HC ERCP STENT PLACEMENT BILIARY/PANCREATIC DUCT: Performed by: INTERNAL MEDICINE

## 2023-04-05 PROCEDURE — 2060000000 HC ICU INTERMEDIATE R&B

## 2023-04-05 PROCEDURE — C1769 GUIDE WIRE: HCPCS | Performed by: INTERNAL MEDICINE

## 2023-04-05 PROCEDURE — 74328 X-RAY BILE DUCT ENDOSCOPY: CPT

## 2023-04-05 PROCEDURE — 2709999900 HC NON-CHARGEABLE SUPPLY: Performed by: INTERNAL MEDICINE

## 2023-04-05 PROCEDURE — 2720000010 HC SURG SUPPLY STERILE: Performed by: INTERNAL MEDICINE

## 2023-04-05 PROCEDURE — APPNB15 APP NON BILLABLE TIME 0-15 MINS: Performed by: PHYSICIAN ASSISTANT

## 2023-04-05 PROCEDURE — 36415 COLL VENOUS BLD VENIPUNCTURE: CPT

## 2023-04-05 PROCEDURE — 99024 POSTOP FOLLOW-UP VISIT: CPT | Performed by: PHYSICIAN ASSISTANT

## 2023-04-05 PROCEDURE — 3700000000 HC ANESTHESIA ATTENDED CARE: Performed by: INTERNAL MEDICINE

## 2023-04-05 PROCEDURE — 6360000002 HC RX W HCPCS: Performed by: SURGERY

## 2023-04-05 PROCEDURE — 7100000000 HC PACU RECOVERY - FIRST 15 MIN: Performed by: INTERNAL MEDICINE

## 2023-04-05 PROCEDURE — 80053 COMPREHEN METABOLIC PANEL: CPT

## 2023-04-05 PROCEDURE — 3609014900 HC ERCP W/SPHINCTEROTOMY &/OR PAPILLOTOMY: Performed by: INTERNAL MEDICINE

## 2023-04-05 PROCEDURE — 2580000003 HC RX 258: Performed by: INTERNAL MEDICINE

## 2023-04-05 PROCEDURE — APPSS15 APP SPLIT SHARED TIME 0-15 MINUTES: Performed by: PHYSICIAN ASSISTANT

## 2023-04-05 PROCEDURE — 2580000003 HC RX 258: Performed by: SURGERY

## 2023-04-05 PROCEDURE — 3700000001 HC ADD 15 MINUTES (ANESTHESIA): Performed by: INTERNAL MEDICINE

## 2023-04-05 PROCEDURE — 6360000002 HC RX W HCPCS: Performed by: INTERNAL MEDICINE

## 2023-04-05 PROCEDURE — 3609015200 HC ERCP REMOVE CALCULI/DEBRIS BILIARY/PANCREAS DUCT: Performed by: INTERNAL MEDICINE

## 2023-04-05 PROCEDURE — 94760 N-INVAS EAR/PLS OXIMETRY 1: CPT

## 2023-04-05 PROCEDURE — 6360000004 HC RX CONTRAST MEDICATION: Performed by: INTERNAL MEDICINE

## 2023-04-05 DEVICE — BILIARY STENT WITH NAVIFLEXTM RX DELIVERY SYSTEM
Type: IMPLANTABLE DEVICE | Site: BILE DUCT | Status: FUNCTIONAL
Brand: ADVANIX™ BILIARY

## 2023-04-05 RX ORDER — MEPERIDINE HYDROCHLORIDE 25 MG/ML
12.5 INJECTION INTRAMUSCULAR; INTRAVENOUS; SUBCUTANEOUS EVERY 5 MIN PRN
Status: DISCONTINUED | OUTPATIENT
Start: 2023-04-05 | End: 2023-04-05 | Stop reason: SDUPTHER

## 2023-04-05 RX ORDER — PROPOFOL 10 MG/ML
INJECTION, EMULSION INTRAVENOUS PRN
Status: DISCONTINUED | OUTPATIENT
Start: 2023-04-05 | End: 2023-04-05 | Stop reason: SDUPTHER

## 2023-04-05 RX ORDER — FENTANYL CITRATE 50 UG/ML
INJECTION, SOLUTION INTRAMUSCULAR; INTRAVENOUS PRN
Status: DISCONTINUED | OUTPATIENT
Start: 2023-04-05 | End: 2023-04-05 | Stop reason: SDUPTHER

## 2023-04-05 RX ORDER — LIDOCAINE HYDROCHLORIDE 20 MG/ML
INJECTION, SOLUTION EPIDURAL; INFILTRATION; INTRACAUDAL; PERINEURAL PRN
Status: DISCONTINUED | OUTPATIENT
Start: 2023-04-05 | End: 2023-04-05 | Stop reason: SDUPTHER

## 2023-04-05 RX ORDER — FENTANYL CITRATE 50 UG/ML
25 INJECTION, SOLUTION INTRAMUSCULAR; INTRAVENOUS EVERY 5 MIN PRN
Status: DISCONTINUED | OUTPATIENT
Start: 2023-04-05 | End: 2023-04-05 | Stop reason: SDUPTHER

## 2023-04-05 RX ORDER — MEPERIDINE HYDROCHLORIDE 25 MG/ML
12.5 INJECTION INTRAMUSCULAR; INTRAVENOUS; SUBCUTANEOUS EVERY 5 MIN PRN
Status: DISCONTINUED | OUTPATIENT
Start: 2023-04-05 | End: 2023-04-05

## 2023-04-05 RX ORDER — ONDANSETRON 2 MG/ML
INJECTION INTRAMUSCULAR; INTRAVENOUS PRN
Status: DISCONTINUED | OUTPATIENT
Start: 2023-04-05 | End: 2023-04-05 | Stop reason: SDUPTHER

## 2023-04-05 RX ORDER — SODIUM CHLORIDE 0.9 % (FLUSH) 0.9 %
5-40 SYRINGE (ML) INJECTION PRN
Status: DISCONTINUED | OUTPATIENT
Start: 2023-04-05 | End: 2023-04-05

## 2023-04-05 RX ORDER — ONDANSETRON 2 MG/ML
4 INJECTION INTRAMUSCULAR; INTRAVENOUS
Status: DISCONTINUED | OUTPATIENT
Start: 2023-04-05 | End: 2023-04-05

## 2023-04-05 RX ORDER — SODIUM CHLORIDE 9 MG/ML
INJECTION, SOLUTION INTRAVENOUS PRN
Status: DISCONTINUED | OUTPATIENT
Start: 2023-04-05 | End: 2023-04-05

## 2023-04-05 RX ORDER — SODIUM CHLORIDE 0.9 % (FLUSH) 0.9 %
5-40 SYRINGE (ML) INJECTION EVERY 12 HOURS SCHEDULED
Status: DISCONTINUED | OUTPATIENT
Start: 2023-04-05 | End: 2023-04-05

## 2023-04-05 RX ORDER — SUCCINYLCHOLINE/SOD CL,ISO/PF 200MG/10ML
SYRINGE (ML) INTRAVENOUS PRN
Status: DISCONTINUED | OUTPATIENT
Start: 2023-04-05 | End: 2023-04-05 | Stop reason: SDUPTHER

## 2023-04-05 RX ORDER — ROCURONIUM BROMIDE 10 MG/ML
INJECTION, SOLUTION INTRAVENOUS PRN
Status: DISCONTINUED | OUTPATIENT
Start: 2023-04-05 | End: 2023-04-05 | Stop reason: SDUPTHER

## 2023-04-05 RX ORDER — FENTANYL CITRATE 50 UG/ML
50 INJECTION, SOLUTION INTRAMUSCULAR; INTRAVENOUS EVERY 5 MIN PRN
Status: DISCONTINUED | OUTPATIENT
Start: 2023-04-05 | End: 2023-04-05 | Stop reason: SDUPTHER

## 2023-04-05 RX ORDER — LAMOTRIGINE 25 MG/1
25 TABLET ORAL 2 TIMES DAILY
COMMUNITY

## 2023-04-05 RX ORDER — ESMOLOL HYDROCHLORIDE 10 MG/ML
INJECTION INTRAVENOUS PRN
Status: DISCONTINUED | OUTPATIENT
Start: 2023-04-05 | End: 2023-04-05 | Stop reason: SDUPTHER

## 2023-04-05 RX ORDER — DEXAMETHASONE SODIUM PHOSPHATE 4 MG/ML
INJECTION, SOLUTION INTRA-ARTICULAR; INTRALESIONAL; INTRAMUSCULAR; INTRAVENOUS; SOFT TISSUE PRN
Status: DISCONTINUED | OUTPATIENT
Start: 2023-04-05 | End: 2023-04-05 | Stop reason: SDUPTHER

## 2023-04-05 RX ORDER — ONDANSETRON 2 MG/ML
4 INJECTION INTRAMUSCULAR; INTRAVENOUS
Status: DISCONTINUED | OUTPATIENT
Start: 2023-04-05 | End: 2023-04-05 | Stop reason: SDUPTHER

## 2023-04-05 RX ADMIN — Medication 10 ML: at 07:43

## 2023-04-05 RX ADMIN — SODIUM CHLORIDE: 9 INJECTION, SOLUTION INTRAVENOUS at 01:57

## 2023-04-05 RX ADMIN — ROCURONIUM BROMIDE 5 MG: 10 INJECTION INTRAVENOUS at 08:00

## 2023-04-05 RX ADMIN — PROPOFOL 200 MG: 10 INJECTION, EMULSION INTRAVENOUS at 07:59

## 2023-04-05 RX ADMIN — SODIUM CHLORIDE: 9 INJECTION, SOLUTION INTRAVENOUS at 22:59

## 2023-04-05 RX ADMIN — LIDOCAINE HYDROCHLORIDE 100 MG: 20 INJECTION, SOLUTION EPIDURAL; INFILTRATION; INTRACAUDAL; PERINEURAL at 07:59

## 2023-04-05 RX ADMIN — FENTANYL CITRATE 50 MCG: 50 INJECTION INTRAMUSCULAR; INTRAVENOUS at 07:57

## 2023-04-05 RX ADMIN — ONDANSETRON 4 MG: 2 INJECTION INTRAMUSCULAR; INTRAVENOUS at 08:06

## 2023-04-05 RX ADMIN — Medication 160 MG: at 08:00

## 2023-04-05 RX ADMIN — MORPHINE SULFATE 4 MG: 4 INJECTION, SOLUTION INTRAMUSCULAR; INTRAVENOUS at 01:54

## 2023-04-05 RX ADMIN — MORPHINE SULFATE 4 MG: 4 INJECTION, SOLUTION INTRAMUSCULAR; INTRAVENOUS at 05:29

## 2023-04-05 RX ADMIN — ACETAMINOPHEN 650 MG: 325 TABLET ORAL at 20:23

## 2023-04-05 RX ADMIN — CIPROFLOXACIN 400 MG: 2 INJECTION, SOLUTION INTRAVENOUS at 20:22

## 2023-04-05 RX ADMIN — ESMOLOL HYDROCHLORIDE 10 MG: 100 INJECTION, SOLUTION INTRAVENOUS at 08:22

## 2023-04-05 RX ADMIN — ESMOLOL HYDROCHLORIDE 10 MG: 100 INJECTION, SOLUTION INTRAVENOUS at 08:12

## 2023-04-05 RX ADMIN — MORPHINE SULFATE 2 MG: 2 INJECTION, SOLUTION INTRAMUSCULAR; INTRAVENOUS at 09:29

## 2023-04-05 RX ADMIN — FENTANYL CITRATE 50 MCG: 50 INJECTION INTRAMUSCULAR; INTRAVENOUS at 08:00

## 2023-04-05 RX ADMIN — CIPROFLOXACIN 400 MG: 2 INJECTION, SOLUTION INTRAVENOUS at 07:43

## 2023-04-05 RX ADMIN — SODIUM CHLORIDE: 9 INJECTION, SOLUTION INTRAVENOUS at 09:33

## 2023-04-05 RX ADMIN — PROPOFOL 50 MG: 10 INJECTION, EMULSION INTRAVENOUS at 08:00

## 2023-04-05 RX ADMIN — DEXAMETHASONE SODIUM PHOSPHATE 8 MG: 4 INJECTION, SOLUTION INTRAMUSCULAR; INTRAVENOUS at 08:06

## 2023-04-05 RX ADMIN — ESMOLOL HYDROCHLORIDE 10 MG: 100 INJECTION, SOLUTION INTRAVENOUS at 08:07

## 2023-04-05 RX ADMIN — GLUCAGON HYDROCHLORIDE 1 MG: KIT at 08:14

## 2023-04-05 ASSESSMENT — PAIN DESCRIPTION - ORIENTATION: ORIENTATION: RIGHT;MID

## 2023-04-05 ASSESSMENT — PAIN DESCRIPTION - LOCATION
LOCATION: ABDOMEN
LOCATION: HEAD

## 2023-04-05 ASSESSMENT — PAIN SCALES - GENERAL
PAINLEVEL_OUTOF10: 3
PAINLEVEL_OUTOF10: 4
PAINLEVEL_OUTOF10: 5
PAINLEVEL_OUTOF10: 2
PAINLEVEL_OUTOF10: 6
PAINLEVEL_OUTOF10: 4
PAINLEVEL_OUTOF10: 7
PAINLEVEL_OUTOF10: 5
PAINLEVEL_OUTOF10: 7

## 2023-04-05 ASSESSMENT — PAIN DESCRIPTION - FREQUENCY: FREQUENCY: CONTINUOUS

## 2023-04-05 ASSESSMENT — PAIN DESCRIPTION - DESCRIPTORS
DESCRIPTORS: ACHING

## 2023-04-05 ASSESSMENT — PAIN DESCRIPTION - PAIN TYPE: TYPE: SURGICAL PAIN

## 2023-04-05 ASSESSMENT — PAIN DESCRIPTION - ONSET: ONSET: ON-GOING

## 2023-04-05 ASSESSMENT — ENCOUNTER SYMPTOMS: SHORTNESS OF BREATH: 0

## 2023-04-05 NOTE — CARE COORDINATION
4/5 Plan: Home with family. ERCP today with stent. Advance diet on 4/6. Needs work note at d/c.  Electronically signed by Connor Baeza RN on 4/5/2023 at 12:12 PM
DME:  None  Patient expects to discharge to: House  Plan for transportation at discharge:  Family    Financial    Payor: Rajinder 58 / Plan: Khadijah 51 / Product Type: *No Product type* /     Does insurance require precert for SNF: Yes    Potential assistance Purchasing Medications: No  Meds-to-Beds request: Yes    Kira Walls[place]amish Irene CEJA [facility] [phone] [facility] [address]  Phone: [phone] Fax: [phone]    Notes:  Factors facilitating achievement of predicted outcomes: Family support, Motivated, Cooperative, Pleasant, and Good insight into deficits    Barriers to discharge: Pain    Additional Case Management Notes:   1) Waiting on general surgery clearance including advanced diet toleration and pain management. 2) Requesting a work note for her wife at discharge. The Plan for Transition of Care is related to the following treatment goals of Gallstones [K80.20]  Choledocholithiasis [K80.50]    Respectfully submitted,    Yu JETT, [facility]   [phone]    Electronically signed by MALIHA Shoemaker, LSW on 4/4/2023 at 4:01 PM

## 2023-04-05 NOTE — OP NOTE
intra and extrahepatic bile ducts were normal.  No obvious bile leak. Next, a 0.025\" Mark Pilar was passed without resistance into the bile duct and using the wire as a guide and using blended cautery, a 1cm biliary sphincterotomy was performed to the 12:00 position without complications. Next, several balloon sweeps were made with an extraction balloon removing a single stone. After negative balloon sweeps, completion occlusion cholangiogram was performed which revealed no residual filling defects. Next, several further balloon sweeps were performed to clear the contrast from the duct and the procedure was terminated. Next, the wire was left in place and a 10Fr 9cm plastic Advantix biliary stent was placed with the proximal aspect in the proximal common hepatic duct and the distal aspect in the duodenum. Bile and contrast drained. Pancreatogram:  The pancreatic duct was not cannulated. The cannulas were then withdrawn. The duodenum and stomach were decompressed and the scope was withdrawn from the patient. The patient tolerated the procedure well and was taken to the post anesthesia care unit in good condition. Radiological Interpretation:  All fluoroscopic images and still x-ray films were carefullly examined and interpreted by the endoscopist on the spot during the procedure in the absence of radiologist.  These interpretations guided the course of the procedure and the interventions mentioned above and below. Estimated blood loss: minimal  Specimens taken: none    Impression:  1. Choledocolithiasis s/p biliary sphincterotomy and stone extraction with complete clearance documented by completion occlusion cholangiogram.  2.  No bile leak identified on cholangiogram or occlusion cholangiogram.  Given Dr. Cheli Malik' concern for the inflammation of the cystic duct and possible future bile leak, a 10Fr 9cm Advantix biliary stent was placed. Recommendations:  1. Clear diet.   If does well

## 2023-04-05 NOTE — ANESTHESIA POSTPROCEDURE EVALUATION
04/05/2023 05:15 AM        HCT                      32.1 (L)            04/05/2023 05:15 AM        PLT                      445                 04/05/2023 05:15 AM   RENAL  Lab Results       Component                Value               Date/Time                  NA                       139                 04/05/2023 05:15 AM        K                        4.6                 04/05/2023 05:15 AM        CL                       103                 04/05/2023 05:15 AM        CO2                      26                  04/05/2023 05:15 AM        BUN                      6 (L)               04/05/2023 05:15 AM        CREATININE               0.7                 04/05/2023 05:15 AM        GLUCOSE                  138 (H)             04/05/2023 05:15 AM   COAGS  No results found for: PROTIME, INR, APTT    Intake & Output:  @85BGQM@    Nausea & Vomiting:  No    Level of Consciousness:  Awake    Pain Assessment:  Adequate analgesia    Anesthesia Complications:  No apparent anesthetic complications    SUMMARY      Vital signs stable  OK to discharge from Stage I post anesthesia care.   Care transferred from Anesthesiology department on discharge from perioperative area

## 2023-04-05 NOTE — H&P
Preoperative History and Physical Update    H&P from 4/3/2023 was reviewed    No changes noted today    PE  Alert and oriented  Non tender abdomen    A/P  Biliary colic  For Laparoscopic Cholecystectomy with Cholangiogram    The risks, benefits and alternatives to the planned procedure were discussed. Patient expressed an understanding and is willing to proceed.     Electronically signed by Simon Valera MD on 4/4/2023 at 7:28 AM
(IMITREX) 50 MG tablet Take 1 tablet by mouth once as needed      pantoprazole (PROTONIX) 20 MG tablet Take 1 tablet by mouth daily (Patient taking differently: Take 2 tablets by mouth daily) 30 tablet 0        Allergies:  Penicillins    Social History:   Social History     Socioeconomic History    Marital status:      Spouse name: Not on file    Number of children: Not on file    Years of education: Not on file    Highest education level: Not on file   Occupational History    Not on file   Tobacco Use    Smoking status: Former     Packs/day: 0.25     Years: 12.00     Pack years: 3.00     Types: Cigarettes     Quit date: 2021     Years since quittin.2    Smokeless tobacco: Never   Vaping Use    Vaping Use: Never used   Substance and Sexual Activity    Alcohol use: Yes     Comment: rare    Drug use: Never    Sexual activity: Yes     Partners: Female   Other Topics Concern    Not on file   Social History Narrative    Not on file     Social Determinants of Health     Financial Resource Strain: Not on file   Food Insecurity: Not on file   Transportation Needs: Not on file   Physical Activity: Not on file   Stress: Not on file   Social Connections: Not on file   Intimate Partner Violence: Not on file   Housing Stability: Not on file      Family History:       Problem Relation Age of Onset    Other Mother         graves disease    Diabetes Mother         PHYSICAL EXAM:      BP (!) 161/89   Pulse (!) 112   Temp 98.4 °F (36.9 °C) (Oral)   Resp 18   Ht 5' (1.524 m)   Wt 265 lb 10.5 oz (120.5 kg)   LMP 2023 (Exact Date)   SpO2 95%   BMI 51.88 kg/m²  I        Heart:  RRR    Lungs:  CTA b    Abdomen:  S/NT/ND/+BS      ASSESSMENT AND PLAN:  ASA: per anesthesia  Mallampati: per anesthesia  1. Patient is a 44 y.o. female here for ERCP   2. Procedure options, risks and benefits reviewed with the patient. The patient expresses understanding.     Darren Hall

## 2023-04-05 NOTE — ANESTHESIA PRE PROCEDURE
[facility] Department of Anesthesiology  Pre-Anesthesia Evaluation/Consultation       Name:  Lisandra Smith  : 1985  Age:  37 y.o.                                           MRN:  [id number]  Date: 2023           Surgeon: Surgeon(s):  Chidi Spivey MD    Procedure: Procedure(s):  ENDOSCOPIC RETROGRADE CHOLANGIOPANCREATOGRAPHY     Allergies   Allergen Reactions   • Penicillins Other (See Comments)     Pt prone to yeast infections     Patient Active Problem List   Diagnosis   • Depression   • Carpal tunnel syndrome   • Depression with anxiety   • Cubital tunnel syndrome   • ADHD, predominantly inattentive type   • Bipolar 2 disorder (HCC)   • Moderate single current episode of major depressive disorder (HCC)   • Choledocholithiasis     Past Medical History:   Diagnosis Date   • Acid reflux     mild   • Anxiety and depression    • Bipolar 1 disorder (HCC)    • Hiatal hernia    • Migraine    • Obesity    • Thyroid disease    • Wears glasses      Past Surgical History:   Procedure Laterality Date   • CARPAL TUNNEL RELEASE Right 2014   • CARPAL TUNNEL RELEASE Left 2016   •  SECTION   and    • CHOLECYSTECTOMY, LAPAROSCOPIC N/A 2023    LAPAROSCOPIC CHOLECYSTECTOMY WITH CHOLANGIOGRAM performed by Henry Duckworth MD at [facility] OR   • ENDOSCOPY, COLON, DIAGNOSTIC     • ULNAR TUNNEL RELEASE Left 2016   • WISDOM TOOTH EXTRACTION  2002     Social History     Tobacco Use   • Smoking status: Former     Packs/day: 0.25     Years: 12.00     Pack years: 3.00     Types: Cigarettes     Quit date: 2021     Years since quittin.2   • Smokeless tobacco: Never   Vaping Use   • Vaping Use: Never used   Substance Use Topics   • Alcohol use: Yes     Comment: rare   • Drug use: Never     Medications  Current Facility-Administered Medications on File Prior to Visit   Medication Dose Route Frequency Provider Last Rate Last Admin   • sodium chloride flush 0.9 % injection

## 2023-04-05 NOTE — OP NOTE
lateral surface of the  neck of the gallbladder. It was very difficult to manipulate at this  point due to the impacted nature of the stones, so the gallbladder was  opened and that stone removed to allow for easier mobility near the  cystic duct. Eventually, we were able to dissect the cystic duct and  artery circumferentially. The cystic duct was then opened with the  scissors and a cholangiogram catheter inserted. A cholangiogram was  performed showing normal biliary anatomy. There was a single filling  defect in the distal common bile duct, although contrast did flow to the  duodenum. The catheter was now withdrawn. The cystic duct divided and  secured with a PDS Endoloop. The cystic artery was clipped and divided  and the gallbladder  from the liver bed using electrocautery. It was then placed into an Endopouch and extracted from the umbilical  trocar site. The large stone was retrieved with a second Endopouch. Hemostasis was now achieved along the liver edge using cautery and  Surgicel. The abdomen was irrigated, suctioned clear, and once  hemostasis was confirmed, we did leave a 19 round channel drain through  the right lateral trocar site that was placed into the liver bed and  will be maintained until the patient has her ERCP. The trocars were now  withdrawn. The umbilicus closed with an 0 Vicryl in the fascia. Skin  incisions closed with 4-0 Vicryl. Dressings were applied. The patient  was transferred to Recovery in stable condition.         Nora Thompson MD    D: 04/04/2023 11:29:17       T: 04/04/2023 11:32:46     [id number]  Job#: [id number]     Doc#: [id number]    CC:

## 2023-04-06 VITALS
WEIGHT: 266.1 LBS | RESPIRATION RATE: 16 BRPM | DIASTOLIC BLOOD PRESSURE: 65 MMHG | SYSTOLIC BLOOD PRESSURE: 111 MMHG | HEIGHT: 60 IN | HEART RATE: 89 BPM | BODY MASS INDEX: 52.24 KG/M2 | OXYGEN SATURATION: 97 % | TEMPERATURE: 98.2 F

## 2023-04-06 LAB
DEPRECATED RDW RBC AUTO: 14.8 % (ref 12.4–15.4)
HCT VFR BLD AUTO: 32.5 % (ref 36–48)
HGB BLD-MCNC: 10.5 G/DL (ref 12–16)
LIPASE SERPL-CCNC: 29 U/L (ref 13–60)
MCH RBC QN AUTO: 29.2 PG (ref 26–34)
MCHC RBC AUTO-ENTMCNC: 32.2 G/DL (ref 31–36)
MCV RBC AUTO: 90.6 FL (ref 80–100)
PLATELET # BLD AUTO: 423 K/UL (ref 135–450)
PMV BLD AUTO: 6.5 FL (ref 5–10.5)
RBC # BLD AUTO: 3.58 M/UL (ref 4–5.2)
WBC # BLD AUTO: 11.2 K/UL (ref 4–11)

## 2023-04-06 PROCEDURE — 99024 POSTOP FOLLOW-UP VISIT: CPT | Performed by: PHYSICIAN ASSISTANT

## 2023-04-06 PROCEDURE — APPNB15 APP NON BILLABLE TIME 0-15 MINS: Performed by: PHYSICIAN ASSISTANT

## 2023-04-06 PROCEDURE — 6370000000 HC RX 637 (ALT 250 FOR IP): Performed by: INTERNAL MEDICINE

## 2023-04-06 PROCEDURE — APPSS15 APP SPLIT SHARED TIME 0-15 MINUTES: Performed by: PHYSICIAN ASSISTANT

## 2023-04-06 PROCEDURE — 36415 COLL VENOUS BLD VENIPUNCTURE: CPT

## 2023-04-06 PROCEDURE — 83690 ASSAY OF LIPASE: CPT

## 2023-04-06 PROCEDURE — 6360000002 HC RX W HCPCS: Performed by: INTERNAL MEDICINE

## 2023-04-06 PROCEDURE — 94760 N-INVAS EAR/PLS OXIMETRY 1: CPT

## 2023-04-06 PROCEDURE — 85027 COMPLETE CBC AUTOMATED: CPT

## 2023-04-06 RX ADMIN — ACETAMINOPHEN 650 MG: 325 TABLET ORAL at 11:31

## 2023-04-06 RX ADMIN — CIPROFLOXACIN 400 MG: 2 INJECTION, SOLUTION INTRAVENOUS at 06:43

## 2023-04-06 RX ADMIN — ACETAMINOPHEN 650 MG: 325 TABLET ORAL at 02:23

## 2023-04-06 ASSESSMENT — PAIN SCALES - GENERAL
PAINLEVEL_OUTOF10: 2
PAINLEVEL_OUTOF10: 2
PAINLEVEL_OUTOF10: 3
PAINLEVEL_OUTOF10: 2

## 2023-04-06 ASSESSMENT — PAIN DESCRIPTION - LOCATION
LOCATION: ABDOMEN;HEAD
LOCATION: ABDOMEN

## 2023-04-06 ASSESSMENT — PAIN DESCRIPTION - DESCRIPTORS: DESCRIPTORS: ACHING

## 2023-04-06 NOTE — PLAN OF CARE
Problem: Discharge Planning  Goal: Discharge to home or other facility with appropriate resources  4/5/2023 0242 by Chantal Tovar RN  Outcome: Progressing  Flowsheets  Taken 4/4/2023 2025 by Chantal Tovar RN  Discharge to home or other facility with appropriate resources:   Identify barriers to discharge with patient and caregiver   Arrange for needed discharge resources and transportation as appropriate  Taken 4/4/2023 1731 by Nicolle Gonzalez RN  Discharge to home or other facility with appropriate resources: Identify barriers to discharge with patient and caregiver  4/4/2023 1609 by Nicolle Gonzalez RN  Outcome: Progressing  Flowsheets (Taken 4/4/2023 1547)  Discharge to home or other facility with appropriate resources: Identify barriers to discharge with patient and caregiver     Problem: Safety - Adult  Goal: Free from fall injury  4/5/2023 0242 by Chantal Tovar RN  Outcome: Progressing  4/4/2023 1609 by Nicolle Gonzalez RN  Outcome: Progressing     Problem: Pain  Goal: Verbalizes/displays adequate comfort level or baseline comfort level  4/5/2023 0242 by Chantal Tovar RN  Outcome: Progressing  4/4/2023 1609 by Nicolle Gonzalez RN  Outcome: Progressing     Problem: Skin/Tissue Integrity - Adult  Goal: Skin integrity remains intact  Outcome: Progressing  Flowsheets (Taken 4/4/2023 2025)  Skin Integrity Remains Intact:   Monitor for areas of redness and/or skin breakdown   Assess vascular access sites hourly  Goal: Incisions, wounds, or drain sites healing without S/S of infection  Outcome: Progressing  Flowsheets (Taken 4/4/2023 2025)  Incisions, Wounds, or Drain Sites Healing Without Sign and Symptoms of Infection: TWICE DAILY: Assess and document skin integrity     Problem: Musculoskeletal - Adult  Goal: Return mobility to safest level of function  Outcome: Progressing  Flowsheets (Taken 4/4/2023 2025)  Return Mobility to Safest Level of Function:   Assess patient stability and activity tolerance for
Problem: Discharge Planning  Goal: Discharge to home or other facility with appropriate resources  Outcome: Completed     Problem: Safety - Adult  Goal: Free from fall injury  Outcome: Completed     Problem: Pain  Goal: Verbalizes/displays adequate comfort level or baseline comfort level  Outcome: Completed     Problem: Skin/Tissue Integrity - Adult  Goal: Skin integrity remains intact  Outcome: Completed  Goal: Incisions, wounds, or drain sites healing without S/S of infection  Outcome: Completed     Problem: Musculoskeletal - Adult  Goal: Return mobility to safest level of function  Outcome: Completed  Goal: Return ADL status to a safe level of function  Outcome: Completed     Problem: Gastrointestinal - Adult  Goal: Minimal or absence of nausea and vomiting  Outcome: Completed  Goal: Maintains or returns to baseline bowel function  Outcome: Completed  Goal: Maintains adequate nutritional intake  Outcome: Completed     Problem: Infection - Adult  Goal: Absence of infection at discharge  Outcome: Completed     Problem: Respiratory - Adult  Goal: Achieves optimal ventilation and oxygenation  Outcome: Completed     Problem: Genitourinary - Adult  Goal: Absence of urinary retention  Outcome: Completed
Problem: Discharge Planning  Goal: Discharge to home or other facility with appropriate resources  Outcome: Progressing  Flowsheets (Taken 4/4/2023 4673)  Discharge to home or other facility with appropriate resources: Identify barriers to discharge with patient and caregiver     Problem: Safety - Adult  Goal: Free from fall injury  Outcome: Progressing     Problem: Pain  Goal: Verbalizes/displays adequate comfort level or baseline comfort level  Outcome: Progressing
ability to void and empty bladder   Monitor intake/output and perform bladder scan as needed

## 2023-04-06 NOTE — PROGRESS NOTES
4 Eyes Skin Assessment     NAME:  To Cabral  YOB: 1985  MEDICAL RECORD NUMBER:  [id number]    The patient is being assessed for  Admission    I agree that One RN has performed a thorough Head to Toe Skin Assessment on the patient. ALL assessment sites listed below have been assessed. Areas assessed by both nurses:    Head, Face, Ears, Shoulders, Back, Chest, Arms, Elbows, Hands, Sacrum. Buttock, Coccyx, Ischium, and Legs. Feet and Heels        Does the Patient have a Wound?  No noted wound(s)       Valente Prevention initiated by RN: No   Wound Care Orders initiated by RN: No    Pressure Injury (Stage 3,4, Unstageable, DTI, NWPT, and Complex wounds) if present, place referral order by RN under : No    New and Established Ostomies, if present place, referral order under : No      Nurse 1 eSignature: Electronically signed by Yovany Sanders RN on 4/4/23 at 7:13 PM EDT    **SHARE this note so that the co-signing nurse can place an eSignature**    Nurse 2 eSignature: Electronically signed by Alvaro Teixeira RN on 4/4/23 at 7:14 PM EDT
[facility] [facility] time _____0600_______        Surgery time ____0730________    Take the following medications with a sip of water: Follow your MD/Surgeons pre-procedure instructions regarding your medications     Do not eat or drink anything after 12:00 midnight prior to your surgery. This includes water chewing gum, mints and ice chips. You may brush your teeth and gargle the morning of your surgery, but do not swallow the water     Please see your family doctor/pediatrician for a history and physical and/or concerning medications. Bring any test results/reports from your physicians office. If you are under the care of a heart doctor or specialist doctor, please be aware that you may be asked to them for clearance    You may be asked to stop blood thinners such as Coumadin, Plavix, Fragmin, Lovenox, etc., or any anti-inflammatories such as:  Aspirin, Ibuprofen, Advil, Naproxen prior to your surgery. We also ask that you stop any OTC medications such as fish oil, vitamin E, glucosamine, garlic, Multivitamins, COQ 10, etc.    We ask that you do not smoke 24 hours prior to surgery  We ask that you do not  drink any alcoholic beverages 24 hours prior to surgery     You must make arrangements for a responsible adult to take you home after your surgery. For your safety you will not be allowed to leave alone or drive yourself home. Your surgery will be cancelled if you do not have a ride home. Also for your safety, it is strongly suggested that someone stay with you the first 24 hours after your surgery. A parent or legal guardian must accompany a child scheduled for surgery and plan to stay at the hospital until the child is discharged. Please do not bring other children with you. For your comfort, please wear simple loose fitting clothing to the hospital.  Please do not bring valuables.     Do not wear any make-up or nail polish on your fingers or
At this time, the nurse saw pt to sign consent and pt did not feel as though she was not comfortable signing and was not alert enough when GI was in room earlier this afternoon. Consent form in paper chart unsigned.
Gastroenterology Progress Note    Blanche Little is a 44 y.o. female patient. Hospitalization Day:2    SUBJECTIVE:    Underwent ERCP with stone extraction with no issue yesterday   Biliary stent was placed. She states she feels well this morning, pain is significantly improved  She is tolerating clears, ready to upgrade diet. ROS:  Gastrointestinal ROS: no abdominal pain, change in bowel habits, or black or bloody stools    Physical    VITALS:  /65   Pulse 89   Temp 98.2 °F (36.8 °C) (Oral)   Resp 16   Ht 5' (1.524 m)   Wt 266 lb 1.5 oz (120.7 kg)   LMP 2023 (Exact Date)   SpO2 97%   BMI 51.97 kg/m²   TEMPERATURE:  Current - Temp: 98.2 °F (36.8 °C); Max - Temp  Av.5 °F (36.9 °C)  Min: 98.2 °F (36.8 °C)  Max: 98.7 °F (37.1 °C)    General:  Alert and oriented,  No apparent distress  Skin- without jaundice  Eyes: anicteric sclera  Lungs CTA Bilaterally, normal effort  Abdomen soft,non distended. Ext: Ambulating without issues     Data    Data Review:    Recent Labs     23  0515   WBC 14.1*   HGB 10.7*   HCT 32.1*   MCV 88.7        Recent Labs     23  0515      K 4.6      CO2 26   BUN 6*   CREATININE 0.7     Recent Labs     23  0515   AST 58*   ALT 53*   BILITOT 0.3   ALKPHOS 132*     No results for input(s): LIPASE, AMYLASE in the last 72 hours. No results for input(s): PROTIME, INR in the last 72 hours. Radiology Review:    FL ERCP BILIARY S&I   Final Result   See above. Please refer to the procedure report for further details. FL CHOLANGIOGRAM OR   Final Result   Focal apparent elliptical filling defect within the distal common bile duct,   just proximal to the ampullary insertion, with mild upstream dilatation of   the common bile duct. This could represent an injected gas bubble. However,   a distal common bile duct stone or mass is also a consideration.   Recommend   correlation with intraoperative findings, and if
General and Vascular Surgery                                                           Daily Progress Note                                                             Bishop James PA-C    Pt Name: Elena Hernandez Record Number: [id number]  Date of Birth 1985   Today's Date: 4/5/2023    Chief Complaint: Abdominal pain    ASSESSMENT/PLAN  S/P (4/4/23): Laparoscopic cholecystectomy with cholangiogram. +filling defect on cholangiogram   Had an ERCP done today. Sphincterotomy, stone extraction, and stent placed  Feeling much better. Tolerating clear liquid diet  Sitting up in chair  Pain controlled  Will continue to monitor and ADAT  Likely home tomorrow    EDUCATION  Patient educated about their illness/diagnosis, stated above, and all questions answered. We discussed the importance of nutrition, medications they are taking, and healthy lifestyle. Rosa Sousa has improved from yesterday. Pain is well controlled. She has no nausea and no vomiting. She is tolerating thin liquids. Current activity is ad lul    OBJECTIVE  VITALS:  height is 5' (1.524 m) and weight is 265 lb 10.5 oz (120.5 kg). Her oral temperature is 98.4 °F (36.9 °C). Her blood pressure is 149/84 (abnormal) and her pulse is 90. Her respiration is 16 and oxygen saturation is 94%. VITALS:  BP (!) 149/84   Pulse 90   Temp 98.4 °F (36.9 °C) (Oral)   Resp 16   Ht 5' (1.524 m)   Wt 265 lb 10.5 oz (120.5 kg)   LMP 04/01/2023 (Exact Date)   SpO2 94%   BMI 51.88 kg/m²   GENERAL: alert, no distress  LUNGS: clear to ausculation, without wheezes, rales or rhonci  HEART: normal rate and regular rhythm  ABDOMEN: non-distended and tenderness present- incisional,  without rebound and guarding  I/O last 3 completed shifts: In: 2212.5 [P.O.:100;  I.V.:1913.8; IV Piggyback:198.7]  Out: 2135 [Urine:1900; Drains:35; Blood:200]  I/O this shift:  In: 250 [I.V.:50; IV
Moderate drainage seeping through drain incision, MD Mark Raymundo at bedside. Dressing reinforced.
PACU Transfer Note    Vitals:    04/04/23 1145   BP: (!) 152/96   Pulse: 88   Resp: 17   Temp: 98 °F (36.7 °C)   SpO2: 96%       In: 700 [I.V.:700]  Out: 225 [Drains:25]    Pain assessment:  present - adequately treated  Pain Level: 3    Report given to Receiving unit RN. All questions answered at this time. Will continue to monitor.        4/4/2023 11:55 AM
Patient BP remains high last one reading 147/107 since coming out of the OR. MD Savita Foss notififed, new orders placed.
Patient admitted to PACU # 5 from OR at 4812 post Laparoscopic cholecystectomy with cholangiogram per MD Kamala Rosario. Attached to PACU monitoring system and report received from anesthesia provider. Patient was reported to be hemodynamically stable during procedure. Patient drowsy on admission, oral airway in place. Dressings x4 are in place, umbilical dressing with small amounts of serosanguinous drainage noted. Will continue to monitor.
Patient back in bed from PACU. Patient is alert and oriented. VSS.
Patient positioned semi-prone with head supported on foam ring pillow. Gel wedge positioned under right chest/upper abdomen. Patient secured to table with safety belt.    Flouro time 2:13  Cap intact post procedure and verified by [facility] RN and  RN  Pad site unremarkable post procedure
Pharmacy Medication Reconciliation Note     List of medications patient is currently taking is complete. Source of information:   1. Conversation with pt at bedside       Allergies   Allergen Reactions    Penicillins Other (See Comments)     Pt prone to yeast infections       Notes regarding home medications:   1. Added Vraylar 1.5 mg daily and Lamictal 25 mg BID   2. Confirmed rest of list is correct.      Linda Tena [facility]   4/5/2023  10:11 AM
Pregnancy refusal in paper chart.
Pt arrived to unit from PACU via hospital bed. VSS. Pain 4/10. 4 incision sites in tact. Moderate drainage from umbilical incision. OXANA drain intact with mild drainage. Pt oriented to room. In bed in lowest and locked position, bed alarm on. Call light within reach.
Pt aware of diet upgrade, new menu provided pt aware of how to order meals.  `
Pt being Dcd this date, PIV removed with no complications noted. Pt provided AVS,denies any questions or concern. Pt aware of follow up instructions, has all personal belongings upon dispo. Awaiting ride.
Pt post op BM at this time: Green with loose consistency per pt report.
Pt used walker to go from bed to bathroom then to chair. Tolerated well. Weaned pt down to room air. O2 92-93% and tolerating well. Changed OXANA drain dressing. Moderate bloody drainage from PACU noted. Will continue to monitor.
Sedation provided per anesthesia. See anesthesia record for medication administration and vitals.
To pacu from endo. Pt awake, states pain 5/10. Pt tearful at times. Abd rounded and soft. IV infusing.
intake/output data recorded.     LABS  Recent Labs     04/05/23  0515 04/06/23  1047   WBC 14.1* 11.2*   HGB 10.7* 10.5*   HCT 32.1* 32.5*    423     --    K 4.6  --      --    CO2 26  --    BUN 6*  --    CREATININE 0.7  --    CALCIUM 8.4  --    AST 58*  --    ALT 53*  --    BILITOT 0.3  --      CBC:   Lab Results   Component Value Date/Time    WBC 11.2 04/06/2023 10:47 AM    RBC 3.58 04/06/2023 10:47 AM    HGB 10.5 04/06/2023 10:47 AM    HCT 32.5 04/06/2023 10:47 AM    MCV 90.6 04/06/2023 10:47 AM    MCH 29.2 04/06/2023 10:47 AM    MCHC 32.2 04/06/2023 10:47 AM    RDW 14.8 04/06/2023 10:47 AM     04/06/2023 10:47 AM    MPV 6.5 04/06/2023 10:47 AM     CMP:    Lab Results   Component Value Date/Time     04/05/2023 05:15 AM    K 4.6 04/05/2023 05:15 AM     04/05/2023 05:15 AM    CO2 26 04/05/2023 05:15 AM    BUN 6 04/05/2023 05:15 AM    CREATININE 0.7 04/05/2023 05:15 AM    GFRAA >60 05/11/2021 10:44 PM    AGRATIO 1.2 04/05/2023 05:15 AM    LABGLOM >60 04/05/2023 05:15 AM    GLUCOSE 138 04/05/2023 05:15 AM    PROT 6.6 04/05/2023 05:15 AM    LABALBU 3.6 04/05/2023 05:15 AM    CALCIUM 8.4 04/05/2023 05:15 AM    BILITOT 0.3 04/05/2023 05:15 AM    ALKPHOS 132 04/05/2023 05:15 AM    AST 58 04/05/2023 05:15 AM    ALT 53 04/05/2023 05:15 AM         Andria Marcano PA-C   Electronically signed 4/6/2023 at 12:06 PM

## 2023-04-07 DIAGNOSIS — K80.50 CHOLEDOCHOLITHIASIS: ICD-10-CM

## 2023-04-07 DIAGNOSIS — Z90.49 S/P LAPAROSCOPIC CHOLECYSTECTOMY: Primary | ICD-10-CM

## 2023-04-07 RX ORDER — IBUPROFEN 600 MG/1
600 TABLET ORAL 3 TIMES DAILY PRN
Qty: 90 TABLET | Refills: 0 | Status: SHIPPED | OUTPATIENT
Start: 2023-04-07

## 2023-04-07 RX ORDER — OXYCODONE HYDROCHLORIDE AND ACETAMINOPHEN 5; 325 MG/1; MG/1
1-2 TABLET ORAL EVERY 6 HOURS PRN
Qty: 28 TABLET | Refills: 0 | Status: SHIPPED | OUTPATIENT
Start: 2023-04-07 | End: 2023-04-14

## 2023-04-07 RX ORDER — DOCUSATE SODIUM 100 MG/1
100 CAPSULE, LIQUID FILLED ORAL 2 TIMES DAILY PRN
Qty: 60 CAPSULE | Refills: 0 | Status: SHIPPED | OUTPATIENT
Start: 2023-04-07

## 2023-04-11 NOTE — DISCHARGE SUMMARY
tablet Take 1 tablet by mouth 2 times dailyHistorical Med      cariprazine hcl (VRAYLAR) 1.5 MG capsule Take 1 capsule by mouth dailyHistorical Med      hydrOXYzine HCl (ATARAX) 50 MG tablet Take 1 tablet by mouth every 8 hours as neededHistorical Med      FEROSUL 325 (65 Fe) MG tablet Take 1 tablet by mouth daily, DAWHistorical Med      levothyroxine (SYNTHROID) 50 MCG tablet TAKE 1 TABLET BY MOUTH DAILYHistorical Med      JUNEL FE 24 1-20 MG-MCG(24) TABS TAKE 1 TABLET BY MOUTH DAILY FOR BIRTH CONTROL, DAWHistorical Med      folic acid (FOLVITE) 1 MG tablet TAKE 1 TABLET BY MOUTH DAILYHistorical Med      docusate sodium (COLACE) 100 MG capsule Take 1 capsule by mouth 2 times dailyHistorical Med      amphetamine-dextroamphetamine (ADDERALL) 10 MG tablet Take 1 tablet by mouth 2 times daily. Historical Med      SUMAtriptan (IMITREX) 50 MG tablet Take 1 tablet by mouth once as neededHistorical Med      pantoprazole (PROTONIX) 20 MG tablet Take 1 tablet by mouth daily, Disp-30 tablet, R-0Print           STOP taking these medications       ketorolac (TORADOL) 10 MG tablet Comments:   Reason for Stopping:               Discharged to:  home    Instruction:  The patient is instructed to return to the hospital or call the office for fevers > 101.5F, inability to tolerate a diet, or unexpected pain. Surgery specific discharge instruction sheet was provided to the patient. Diet:  low fat    Activity: activity as tolerated    Follow up:  Dr. Lam Killian in 1-2 weeks.      Electronically signed by CHACE Astorga on 4/11/2023 at 2:56 PM

## 2023-04-17 ENCOUNTER — TELEPHONE (OUTPATIENT)
Dept: SURGERY | Age: 38
End: 2023-04-17

## 2023-04-17 ENCOUNTER — OFFICE VISIT (OUTPATIENT)
Dept: SURGERY | Age: 38
End: 2023-04-17

## 2023-04-17 ENCOUNTER — HOSPITAL ENCOUNTER (OUTPATIENT)
Age: 38
Discharge: HOME | End: 2023-04-17
Payer: MEDICAID

## 2023-04-17 ENCOUNTER — HOSPITAL ENCOUNTER (OUTPATIENT)
Dept: CT IMAGING | Age: 38
Discharge: HOME | End: 2023-04-17
Payer: MEDICAID

## 2023-04-17 DIAGNOSIS — K80.50 BILIARY COLIC: ICD-10-CM

## 2023-04-17 DIAGNOSIS — K80.50 BILIARY COLIC: Primary | ICD-10-CM

## 2023-04-17 LAB
ALBUMIN SERPL-MCNC: 2.5 G/DL (ref 3.4–5)
ALBUMIN/GLOB SERPL: 0.6 {RATIO} (ref 1.1–2.2)
ALP SERPL-CCNC: 222 U/L (ref 40–129)
ALT SERPL-CCNC: 55 U/L (ref 10–40)
ANION GAP SERPL CALCULATED.3IONS-SCNC: 12 MMOL/L (ref 3–16)
AST SERPL-CCNC: 49 U/L (ref 15–37)
BILIRUB SERPL-MCNC: 0.5 MG/DL (ref 0–1)
BUN SERPL-MCNC: 6 MG/DL (ref 7–20)
CALCIUM SERPL-MCNC: 7.7 MG/DL (ref 8.3–10.6)
CHLORIDE SERPL-SCNC: 101 MMOL/L (ref 99–110)
CO2 SERPL-SCNC: 25 MMOL/L (ref 21–32)
CREAT SERPL-MCNC: 0.6 MG/DL (ref 0.6–1.1)
DEPRECATED RDW RBC AUTO: 15.4 % (ref 12.4–15.4)
GFR SERPLBLD CREATININE-BSD FMLA CKD-EPI: >60 ML/MIN/{1.73_M2}
GLUCOSE SERPL-MCNC: 137 MG/DL (ref 70–99)
HCT VFR BLD AUTO: 26.8 % (ref 36–48)
HGB BLD-MCNC: 8.8 G/DL (ref 12–16)
LIPASE SERPL-CCNC: 30 U/L (ref 13–60)
MCH RBC QN AUTO: 29 PG (ref 26–34)
MCHC RBC AUTO-ENTMCNC: 32.8 G/DL (ref 31–36)
MCV RBC AUTO: 88.3 FL (ref 80–100)
PLATELET # BLD AUTO: 579 K/UL (ref 135–450)
PMV BLD AUTO: 6.6 FL (ref 5–10.5)
POTASSIUM SERPL-SCNC: 2.7 MMOL/L (ref 3.5–5.1)
PROT SERPL-MCNC: 6.6 G/DL (ref 6.4–8.2)
RBC # BLD AUTO: 3.03 M/UL (ref 4–5.2)
SODIUM SERPL-SCNC: 138 MMOL/L (ref 136–145)
WBC # BLD AUTO: 16 K/UL (ref 4–11)

## 2023-04-17 PROCEDURE — 80053 COMPREHEN METABOLIC PANEL: CPT

## 2023-04-17 PROCEDURE — 6360000004 HC RX CONTRAST MEDICATION: Performed by: SURGERY

## 2023-04-17 PROCEDURE — 99024 POSTOP FOLLOW-UP VISIT: CPT | Performed by: SURGERY

## 2023-04-17 PROCEDURE — 85027 COMPLETE CBC AUTOMATED: CPT

## 2023-04-17 PROCEDURE — 83690 ASSAY OF LIPASE: CPT

## 2023-04-17 PROCEDURE — 74160 CT ABDOMEN W/CONTRAST: CPT

## 2023-04-17 PROCEDURE — 36415 COLL VENOUS BLD VENIPUNCTURE: CPT

## 2023-04-17 RX ORDER — OXYCODONE HYDROCHLORIDE 5 MG/1
5 TABLET ORAL EVERY 6 HOURS PRN
Qty: 20 TABLET | Refills: 0 | Status: ON HOLD | OUTPATIENT
Start: 2023-04-17 | End: 2023-04-22

## 2023-04-17 RX ADMIN — IOPAMIDOL 50 ML: 612 INJECTION, SOLUTION INTRAVENOUS at 15:54

## 2023-04-17 RX ADMIN — IOPAMIDOL 75 ML: 755 INJECTION, SOLUTION INTRAVENOUS at 15:54

## 2023-04-17 NOTE — TELEPHONE ENCOUNTER
Pt potassium level was low. Per [facility] potassium 40mg was called to [facility] for 2 weeks.

## 2023-04-17 NOTE — PROGRESS NOTES
Carlota Rodriguez (:  1985) is a 44 y.o. female,Established patient, here for evaluation of the following chief complaint(s):  Post-Op Check (Pt is here today for a postop visit. )         ASSESSMENT/PLAN:  1. Biliary colic  -     CT ABDOMEN W CONTRAST; Future  -     CBC  -     Comprehensive Metabolic Panel  -     Lipase  -     oxyCODONE (ROXICODONE) 5 MG immediate release tablet; Take 1 tablet by mouth every 6 hours as needed for Pain for up to 5 days. Intended supply: 5 days. Take lowest dose possible to manage pain Max Daily Amount: 20 mg, Disp-20 tablet, R-0Normal    Follow up after testing         Subjective   SUBJECTIVE/OBJECTIVE:  HPI  Patient presents s/p Laparoscopic Cholecystectomy with Cholangiogram followed by ERCP and stent for CBD stone. Patient is two weeks post op. Pain level is still moderate to severe and similar to her preop pain. Incision appearance: well healed x 4. Post op complications: ongoing pain. Pathology report reviewed with patient and showed cholecystitis. Given ongoing pain will check CT and labs. Unlikely to be  leak given her postop ERCP but will assess. If nothing on CT will check HIDA. Review of Systems       Objective   Physical Exam       Electronically signed by Waqas Harper MD on 2023 at 2:18 PM        An electronic signature was used to authenticate this note.     --Waqas Harper MD

## 2023-04-18 ENCOUNTER — HOSPITAL ENCOUNTER (OUTPATIENT)
Dept: CT IMAGING | Age: 38
Discharge: HOME | End: 2023-04-18
Payer: MEDICAID

## 2023-04-18 VITALS
TEMPERATURE: 96.9 F | BODY MASS INDEX: 57.52 KG/M2 | OXYGEN SATURATION: 94 % | WEIGHT: 293 LBS | HEIGHT: 60 IN | HEART RATE: 99 BPM | DIASTOLIC BLOOD PRESSURE: 78 MMHG | RESPIRATION RATE: 16 BRPM | SYSTOLIC BLOOD PRESSURE: 120 MMHG

## 2023-04-18 DIAGNOSIS — T81.43XA POSTOPERATIVE INTRA-ABDOMINAL ABSCESS: ICD-10-CM

## 2023-04-18 LAB
INR PPP: 1.25 (ref 0.84–1.16)
PROTHROMBIN TIME: 15.6 SEC (ref 11.5–14.8)

## 2023-04-18 PROCEDURE — 87186 SC STD MICRODIL/AGAR DIL: CPT

## 2023-04-18 PROCEDURE — 85610 PROTHROMBIN TIME: CPT

## 2023-04-18 PROCEDURE — 87070 CULTURE OTHR SPECIMN AEROBIC: CPT

## 2023-04-18 PROCEDURE — 0W9F3ZZ DRAINAGE OF ABDOMINAL WALL, PERCUTANEOUS APPROACH: ICD-10-PCS | Performed by: RADIOLOGY

## 2023-04-18 PROCEDURE — 87077 CULTURE AEROBIC IDENTIFY: CPT

## 2023-04-18 PROCEDURE — 6360000002 HC RX W HCPCS: Performed by: RADIOLOGY

## 2023-04-18 PROCEDURE — 7100000011 HC PHASE II RECOVERY - ADDTL 15 MIN: Performed by: RADIOLOGY

## 2023-04-18 PROCEDURE — 7100000010 HC PHASE II RECOVERY - FIRST 15 MIN: Performed by: RADIOLOGY

## 2023-04-18 PROCEDURE — 77012 CT SCAN FOR NEEDLE BIOPSY: CPT

## 2023-04-18 PROCEDURE — 99152 MOD SED SAME PHYS/QHP 5/>YRS: CPT

## 2023-04-18 PROCEDURE — 6370000000 HC RX 637 (ALT 250 FOR IP): Performed by: RADIOLOGY

## 2023-04-18 PROCEDURE — 87205 SMEAR GRAM STAIN: CPT

## 2023-04-18 PROCEDURE — 36415 COLL VENOUS BLD VENIPUNCTURE: CPT

## 2023-04-18 RX ORDER — OXYCODONE HYDROCHLORIDE AND ACETAMINOPHEN 5; 325 MG/1; MG/1
1 TABLET ORAL EVERY 4 HOURS PRN
Status: DISCONTINUED | OUTPATIENT
Start: 2023-04-18 | End: 2023-04-19 | Stop reason: HOSPADM

## 2023-04-18 RX ORDER — ONDANSETRON 2 MG/ML
4 INJECTION INTRAMUSCULAR; INTRAVENOUS EVERY 6 HOURS PRN
Status: DISCONTINUED | OUTPATIENT
Start: 2023-04-18 | End: 2023-04-19 | Stop reason: HOSPADM

## 2023-04-18 RX ORDER — ACETAMINOPHEN 325 MG/1
650 TABLET ORAL EVERY 4 HOURS PRN
Status: DISCONTINUED | OUTPATIENT
Start: 2023-04-18 | End: 2023-04-19 | Stop reason: HOSPADM

## 2023-04-18 RX ORDER — FENTANYL CITRATE 50 UG/ML
INJECTION, SOLUTION INTRAMUSCULAR; INTRAVENOUS PRN
Status: COMPLETED | OUTPATIENT
Start: 2023-04-18 | End: 2023-04-18

## 2023-04-18 RX ORDER — MIDAZOLAM HYDROCHLORIDE 1 MG/ML
INJECTION INTRAMUSCULAR; INTRAVENOUS PRN
Status: COMPLETED | OUTPATIENT
Start: 2023-04-18 | End: 2023-04-18

## 2023-04-18 RX ORDER — ONDANSETRON 2 MG/ML
4 INJECTION INTRAMUSCULAR; INTRAVENOUS EVERY 8 HOURS PRN
Status: DISCONTINUED | OUTPATIENT
Start: 2023-04-18 | End: 2023-04-19 | Stop reason: HOSPADM

## 2023-04-18 RX ORDER — OXYCODONE HYDROCHLORIDE AND ACETAMINOPHEN 5; 325 MG/1; MG/1
2 TABLET ORAL EVERY 4 HOURS PRN
Status: DISCONTINUED | OUTPATIENT
Start: 2023-04-18 | End: 2023-04-19 | Stop reason: HOSPADM

## 2023-04-18 RX ADMIN — ONDANSETRON 4 MG: 2 INJECTION INTRAMUSCULAR; INTRAVENOUS at 12:34

## 2023-04-18 RX ADMIN — OXYCODONE AND ACETAMINOPHEN 2 TABLET: 5; 325 TABLET ORAL at 13:26

## 2023-04-18 RX ADMIN — FENTANYL CITRATE 25 MCG: 50 INJECTION INTRAMUSCULAR; INTRAVENOUS at 12:40

## 2023-04-18 RX ADMIN — FENTANYL CITRATE 25 MCG: 50 INJECTION INTRAMUSCULAR; INTRAVENOUS at 12:51

## 2023-04-18 RX ADMIN — MIDAZOLAM 0.5 MG: 1 INJECTION INTRAMUSCULAR; INTRAVENOUS at 12:40

## 2023-04-18 ASSESSMENT — PAIN DESCRIPTION - ORIENTATION
ORIENTATION: RIGHT

## 2023-04-18 ASSESSMENT — PAIN DESCRIPTION - DESCRIPTORS
DESCRIPTORS: SHARP;SORE
DESCRIPTORS: DISCOMFORT;SORE
DESCRIPTORS: ACHING
DESCRIPTORS: SORE;SHARP

## 2023-04-18 ASSESSMENT — PAIN DESCRIPTION - FREQUENCY
FREQUENCY: CONTINUOUS

## 2023-04-18 ASSESSMENT — PAIN DESCRIPTION - LOCATION
LOCATION: ABDOMEN

## 2023-04-18 ASSESSMENT — PAIN DESCRIPTION - ONSET
ONSET: ON-GOING

## 2023-04-18 ASSESSMENT — PAIN DESCRIPTION - PAIN TYPE
TYPE: SURGICAL PAIN;CHRONIC PAIN
TYPE: SURGICAL PAIN;CHRONIC PAIN
TYPE: SURGICAL PAIN

## 2023-04-18 ASSESSMENT — PAIN SCALES - GENERAL
PAINLEVEL_OUTOF10: 8
PAINLEVEL_OUTOF10: 5
PAINLEVEL_OUTOF10: 8

## 2023-04-18 ASSESSMENT — PAIN - FUNCTIONAL ASSESSMENT
PAIN_FUNCTIONAL_ASSESSMENT: 0-10
PAIN_FUNCTIONAL_ASSESSMENT: PREVENTS OR INTERFERES SOME ACTIVE ACTIVITIES AND ADLS

## 2023-04-18 NOTE — PRE SEDATION
candidate for plan of sedation: yes    Electronically signed by Jannette Bahena MD on 4/18/2023 at 12:39 PM

## 2023-04-18 NOTE — PROGRESS NOTES
Pt awake. VSS. Demonstrated how to empty drainage bag and pt verbalized process. Discharge instructions reviewed with pt per pt request. Pt verbalizes an understanding of instructions. Son will be at home with pt but was nauseated so not present for instructions. Applied leg straps to bag and assisted pt to dress. Wheeled pt to son's car for discharge.

## 2023-04-18 NOTE — DISCHARGE INSTRUCTIONS
Phyllis Redmond  [address]  Telephone: [phone]      PATIENT NAME: [id number] RECORD NUMBER:  [id number]  TODAY'S DATE: @ED@      Discharge Instructions - Post Abscess Drain Placement. How can you care for yourself at home? Wash your hands before you handle the DRAINAGE  tube. Clean the area around the tube with soap and water every day. If you have been told you can reuse your bag, you can clean the bag after removing it from the tube. To clean the bag, fill it with 2 parts vinegar to 3 parts water, and let it stand for 20 minutes. Then empty it out, and let it air dry. Empty the drainage bag before it is completely full or every 2 to 3 hours. RECORD THE DRAINAGE AMOUNT FOR YOUR DOCTOR  Do not swim or take baths while you have a abscess drain. You can shower after wrapping the end of the drain with plastic wrap. If the device that holds the tube comes loose, please call #[phone] and ask for the radiology nurse. No lifting over 10 lbs for the next 3-4 days      When should you call for help? Call your doctor now or seek immediate medical care if:  Your drain becomes blocked. Your tube leaks. You have a fever, chills, or body aches. You have groin or belly pain. You have pain or bleeding around the tube. You have swelling around the catheter. Watch closely for changes in your health, and be sure to contact your doctor if:  You need more help to learn how to care for your tube. Call Dr. Sanabria Has office on Thursday 4/20 to set up a follow up appointment, and if you have any questions or concerns related to your drainage catheter please call  and ask for a Radiology nurse.

## 2023-04-18 NOTE — PROGRESS NOTES
Pt awake on arrival to phase II. Pt tearful. Pain 8/10 in right abdomen. Drain in place and draining brown liquid. VSS. Given call light. Called for son.

## 2023-04-18 NOTE — PROGRESS NOTES
Percocet 2 tablets PO given for pain 8/10 in abdomen. Meds given with sips of water. Emptied 450 cc brown-red liquid from drain. Repositioned on stretcher. Given call light. Called for son.

## 2023-04-19 ENCOUNTER — TELEPHONE (OUTPATIENT)
Dept: SURGERY | Age: 38
End: 2023-04-19

## 2023-04-20 ENCOUNTER — HOSPITAL ENCOUNTER (INPATIENT)
Age: 38
LOS: 6 days | Discharge: HOME | DRG: 721 | End: 2023-04-27
Attending: STUDENT IN AN ORGANIZED HEALTH CARE EDUCATION/TRAINING PROGRAM | Admitting: STUDENT IN AN ORGANIZED HEALTH CARE EDUCATION/TRAINING PROGRAM
Payer: MEDICAID

## 2023-04-20 DIAGNOSIS — K65.1 INTRA-ABDOMINAL ABSCESS (HCC): ICD-10-CM

## 2023-04-20 DIAGNOSIS — A41.9 SEPTICEMIA (HCC): Primary | ICD-10-CM

## 2023-04-20 DIAGNOSIS — T81.43XA POSTOPERATIVE INTRA-ABDOMINAL ABSCESS: ICD-10-CM

## 2023-04-20 DIAGNOSIS — Z90.49 HISTORY OF LAPAROSCOPIC CHOLECYSTECTOMY: ICD-10-CM

## 2023-04-20 PROCEDURE — 83690 ASSAY OF LIPASE: CPT

## 2023-04-20 PROCEDURE — 84703 CHORIONIC GONADOTROPIN ASSAY: CPT

## 2023-04-20 PROCEDURE — 85025 COMPLETE CBC W/AUTO DIFF WBC: CPT

## 2023-04-20 PROCEDURE — 36415 COLL VENOUS BLD VENIPUNCTURE: CPT

## 2023-04-20 PROCEDURE — 99285 EMERGENCY DEPT VISIT HI MDM: CPT

## 2023-04-20 PROCEDURE — 80053 COMPREHEN METABOLIC PANEL: CPT

## 2023-04-20 PROCEDURE — 83605 ASSAY OF LACTIC ACID: CPT

## 2023-04-20 RX ORDER — ONDANSETRON 2 MG/ML
4 INJECTION INTRAMUSCULAR; INTRAVENOUS ONCE
Status: COMPLETED | OUTPATIENT
Start: 2023-04-20 | End: 2023-04-21

## 2023-04-20 RX ORDER — 0.9 % SODIUM CHLORIDE 0.9 %
30 INTRAVENOUS SOLUTION INTRAVENOUS ONCE
Status: COMPLETED | OUTPATIENT
Start: 2023-04-20 | End: 2023-04-21

## 2023-04-21 ENCOUNTER — APPOINTMENT (OUTPATIENT)
Dept: CT IMAGING | Age: 38
DRG: 721 | End: 2023-04-21
Payer: MEDICAID

## 2023-04-21 ENCOUNTER — APPOINTMENT (OUTPATIENT)
Dept: GENERAL RADIOLOGY | Age: 38
DRG: 721 | End: 2023-04-21
Payer: MEDICAID

## 2023-04-21 PROBLEM — K65.1 INTRA-ABDOMINAL ABSCESS (HCC): Status: ACTIVE | Noted: 2023-04-21

## 2023-04-21 PROBLEM — A41.9 SEPSIS (HCC): Status: ACTIVE | Noted: 2023-04-21

## 2023-04-21 LAB
ALBUMIN SERPL-MCNC: 1.7 G/DL (ref 3.4–5)
ALBUMIN SERPL-MCNC: 2.1 G/DL (ref 3.4–5)
ALBUMIN/GLOB SERPL: 0.3 {RATIO} (ref 1.1–2.2)
ALBUMIN/GLOB SERPL: 0.4 {RATIO} (ref 1.1–2.2)
ALP SERPL-CCNC: 199 U/L (ref 40–129)
ALP SERPL-CCNC: 204 U/L (ref 40–129)
ALT SERPL-CCNC: 33 U/L (ref 10–40)
ALT SERPL-CCNC: 35 U/L (ref 10–40)
ANION GAP SERPL CALCULATED.3IONS-SCNC: 14 MMOL/L (ref 3–16)
ANION GAP SERPL CALCULATED.3IONS-SCNC: 16 MMOL/L (ref 3–16)
AST SERPL-CCNC: 47 U/L (ref 15–37)
AST SERPL-CCNC: 54 U/L (ref 15–37)
BACTERIA URNS QL MICRO: ABNORMAL /HPF
BASOPHILS # BLD: 0.1 K/UL (ref 0–0.2)
BASOPHILS # BLD: 0.1 K/UL (ref 0–0.2)
BASOPHILS NFR BLD: 0.3 %
BASOPHILS NFR BLD: 0.3 %
BILIRUB SERPL-MCNC: 0.9 MG/DL (ref 0–1)
BILIRUB SERPL-MCNC: 0.9 MG/DL (ref 0–1)
BILIRUB UR QL STRIP.AUTO: ABNORMAL
BUDDING YEAST: PRESENT
BUN SERPL-MCNC: 10 MG/DL (ref 7–20)
BUN SERPL-MCNC: 9 MG/DL (ref 7–20)
CALCIUM SERPL-MCNC: 7.3 MG/DL (ref 8.3–10.6)
CALCIUM SERPL-MCNC: 8.1 MG/DL (ref 8.3–10.6)
CHLORIDE SERPL-SCNC: 94 MMOL/L (ref 99–110)
CHLORIDE SERPL-SCNC: 98 MMOL/L (ref 99–110)
CLARITY UR: CLEAR
CO2 SERPL-SCNC: 21 MMOL/L (ref 21–32)
CO2 SERPL-SCNC: 25 MMOL/L (ref 21–32)
COLOR UR: ABNORMAL
CREAT SERPL-MCNC: 0.6 MG/DL (ref 0.6–1.1)
CREAT SERPL-MCNC: 0.6 MG/DL (ref 0.6–1.1)
DEPRECATED RDW RBC AUTO: 15.9 % (ref 12.4–15.4)
DEPRECATED RDW RBC AUTO: 16.2 % (ref 12.4–15.4)
EOSINOPHIL # BLD: 0.1 K/UL (ref 0–0.6)
EOSINOPHIL # BLD: 0.1 K/UL (ref 0–0.6)
EOSINOPHIL NFR BLD: 0.3 %
EOSINOPHIL NFR BLD: 0.4 %
EPI CELLS #/AREA URNS AUTO: 5 /HPF (ref 0–5)
GFR SERPLBLD CREATININE-BSD FMLA CKD-EPI: >60 ML/MIN/{1.73_M2}
GFR SERPLBLD CREATININE-BSD FMLA CKD-EPI: >60 ML/MIN/{1.73_M2}
GLUCOSE SERPL-MCNC: 139 MG/DL (ref 70–99)
GLUCOSE SERPL-MCNC: 155 MG/DL (ref 70–99)
GLUCOSE UR STRIP.AUTO-MCNC: NEGATIVE MG/DL
HCG SERPL QL: NEGATIVE
HCT VFR BLD AUTO: 26.1 % (ref 36–48)
HCT VFR BLD AUTO: 26.6 % (ref 36–48)
HGB BLD-MCNC: 8.4 G/DL (ref 12–16)
HGB BLD-MCNC: 8.8 G/DL (ref 12–16)
HGB UR QL STRIP.AUTO: NEGATIVE
HYALINE CASTS #/AREA URNS AUTO: 1 /LPF (ref 0–8)
KETONES UR STRIP.AUTO-MCNC: 15 MG/DL
LACTATE BLDV-SCNC: 1.6 MMOL/L (ref 0.4–2)
LEUKOCYTE ESTERASE UR QL STRIP.AUTO: NEGATIVE
LIPASE SERPL-CCNC: 64 U/L (ref 13–60)
LYMPHOCYTES # BLD: 1.6 K/UL (ref 1–5.1)
LYMPHOCYTES # BLD: 2.3 K/UL (ref 1–5.1)
LYMPHOCYTES NFR BLD: 7.1 %
LYMPHOCYTES NFR BLD: 7.5 %
MAGNESIUM SERPL-MCNC: 2.3 MG/DL (ref 1.8–2.4)
MCH RBC QN AUTO: 29.5 PG (ref 26–34)
MCH RBC QN AUTO: 29.9 PG (ref 26–34)
MCHC RBC AUTO-ENTMCNC: 31.5 G/DL (ref 31–36)
MCHC RBC AUTO-ENTMCNC: 33.6 G/DL (ref 31–36)
MCV RBC AUTO: 89 FL (ref 80–100)
MCV RBC AUTO: 93.4 FL (ref 80–100)
MONOCYTES # BLD: 0.7 K/UL (ref 0–1.3)
MONOCYTES # BLD: 1 K/UL (ref 0–1.3)
MONOCYTES NFR BLD: 3.1 %
MONOCYTES NFR BLD: 3.3 %
NEUTROPHILS # BLD: 20.4 K/UL (ref 1.7–7.7)
NEUTROPHILS # BLD: 27.7 K/UL (ref 1.7–7.7)
NEUTROPHILS NFR BLD: 88.7 %
NEUTROPHILS NFR BLD: 89 %
NITRITE UR QL STRIP.AUTO: NEGATIVE
PH UR STRIP.AUTO: 5.5 [PH] (ref 5–8)
PHOSPHATE SERPL-MCNC: 2.6 MG/DL (ref 2.5–4.9)
PLATELET # BLD AUTO: 412 K/UL (ref 135–450)
PLATELET # BLD AUTO: 573 K/UL (ref 135–450)
PLATELET BLD QL SMEAR: ABNORMAL
PMV BLD AUTO: 7.4 FL (ref 5–10.5)
PMV BLD AUTO: 7.9 FL (ref 5–10.5)
POTASSIUM SERPL-SCNC: 4.4 MMOL/L (ref 3.5–5.1)
POTASSIUM SERPL-SCNC: 5 MMOL/L (ref 3.5–5.1)
PROT SERPL-MCNC: 6.9 G/DL (ref 6.4–8.2)
PROT SERPL-MCNC: 7.2 G/DL (ref 6.4–8.2)
PROT UR STRIP.AUTO-MCNC: 30 MG/DL
RBC # BLD AUTO: 2.84 M/UL (ref 4–5.2)
RBC # BLD AUTO: 2.93 M/UL (ref 4–5.2)
RBC CLUMPS #/AREA URNS AUTO: 5 /HPF (ref 0–4)
SARS-COV-2 RDRP RESP QL NAA+PROBE: NOT DETECTED
SLIDE REVIEW: ABNORMAL
SODIUM SERPL-SCNC: 133 MMOL/L (ref 136–145)
SODIUM SERPL-SCNC: 135 MMOL/L (ref 136–145)
SP GR UR STRIP.AUTO: 1.06 (ref 1–1.03)
UA COMPLETE W REFLEX CULTURE PNL UR: ABNORMAL
UA DIPSTICK W REFLEX MICRO PNL UR: YES
URN SPEC COLLECT METH UR: ABNORMAL
UROBILINOGEN UR STRIP-ACNC: 4 E.U./DL
WBC # BLD AUTO: 22.9 K/UL (ref 4–11)
WBC # BLD AUTO: 31.2 K/UL (ref 4–11)
WBC #/AREA URNS AUTO: 1 /HPF (ref 0–5)

## 2023-04-21 PROCEDURE — 96366 THER/PROPH/DIAG IV INF ADDON: CPT

## 2023-04-21 PROCEDURE — 6370000000 HC RX 637 (ALT 250 FOR IP): Performed by: PHYSICIAN ASSISTANT

## 2023-04-21 PROCEDURE — 6370000000 HC RX 637 (ALT 250 FOR IP): Performed by: STUDENT IN AN ORGANIZED HEALTH CARE EDUCATION/TRAINING PROGRAM

## 2023-04-21 PROCEDURE — 6360000002 HC RX W HCPCS: Performed by: STUDENT IN AN ORGANIZED HEALTH CARE EDUCATION/TRAINING PROGRAM

## 2023-04-21 PROCEDURE — 6360000002 HC RX W HCPCS: Performed by: SURGERY

## 2023-04-21 PROCEDURE — 1200000000 HC SEMI PRIVATE

## 2023-04-21 PROCEDURE — 99223 1ST HOSP IP/OBS HIGH 75: CPT | Performed by: INTERNAL MEDICINE

## 2023-04-21 PROCEDURE — 2580000003 HC RX 258: Performed by: PHYSICIAN ASSISTANT

## 2023-04-21 PROCEDURE — 85025 COMPLETE CBC W/AUTO DIFF WBC: CPT

## 2023-04-21 PROCEDURE — 74177 CT ABD & PELVIS W/CONTRAST: CPT

## 2023-04-21 PROCEDURE — 83735 ASSAY OF MAGNESIUM: CPT

## 2023-04-21 PROCEDURE — 81001 URINALYSIS AUTO W/SCOPE: CPT

## 2023-04-21 PROCEDURE — 80053 COMPREHEN METABOLIC PANEL: CPT

## 2023-04-21 PROCEDURE — APPNB15 APP NON BILLABLE TIME 0-15 MINS: Performed by: PHYSICIAN ASSISTANT

## 2023-04-21 PROCEDURE — 99024 POSTOP FOLLOW-UP VISIT: CPT | Performed by: PHYSICIAN ASSISTANT

## 2023-04-21 PROCEDURE — 99151 MOD SED SAME PHYS/QHP <5 YRS: CPT

## 2023-04-21 PROCEDURE — 77012 CT SCAN FOR NEEDLE BIOPSY: CPT

## 2023-04-21 PROCEDURE — 6360000004 HC RX CONTRAST MEDICATION: Performed by: PHYSICIAN ASSISTANT

## 2023-04-21 PROCEDURE — 71045 X-RAY EXAM CHEST 1 VIEW: CPT

## 2023-04-21 PROCEDURE — 87075 CULTR BACTERIA EXCEPT BLOOD: CPT

## 2023-04-21 PROCEDURE — 87635 SARS-COV-2 COVID-19 AMP PRB: CPT

## 2023-04-21 PROCEDURE — 87205 SMEAR GRAM STAIN: CPT

## 2023-04-21 PROCEDURE — 6360000002 HC RX W HCPCS: Performed by: INTERNAL MEDICINE

## 2023-04-21 PROCEDURE — 6360000002 HC RX W HCPCS: Performed by: RADIOLOGY

## 2023-04-21 PROCEDURE — 87070 CULTURE OTHR SPECIMN AEROBIC: CPT

## 2023-04-21 PROCEDURE — 2580000003 HC RX 258: Performed by: STUDENT IN AN ORGANIZED HEALTH CARE EDUCATION/TRAINING PROGRAM

## 2023-04-21 PROCEDURE — 96365 THER/PROPH/DIAG IV INF INIT: CPT

## 2023-04-21 PROCEDURE — 87076 CULTURE ANAEROBE IDENT EACH: CPT

## 2023-04-21 PROCEDURE — 87077 CULTURE AEROBIC IDENTIFY: CPT

## 2023-04-21 PROCEDURE — 87102 FUNGUS ISOLATION CULTURE: CPT

## 2023-04-21 PROCEDURE — 96375 TX/PRO/DX INJ NEW DRUG ADDON: CPT

## 2023-04-21 PROCEDURE — 2580000003 HC RX 258: Performed by: INTERNAL MEDICINE

## 2023-04-21 PROCEDURE — 36415 COLL VENOUS BLD VENIPUNCTURE: CPT

## 2023-04-21 PROCEDURE — 87185 SC STD ENZYME DETCJ PER NZM: CPT

## 2023-04-21 PROCEDURE — 87040 BLOOD CULTURE FOR BACTERIA: CPT

## 2023-04-21 PROCEDURE — APPSS15 APP SPLIT SHARED TIME 0-15 MINUTES: Performed by: PHYSICIAN ASSISTANT

## 2023-04-21 PROCEDURE — 6360000002 HC RX W HCPCS: Performed by: PHYSICIAN ASSISTANT

## 2023-04-21 PROCEDURE — 0F9130Z DRAINAGE OF RIGHT LOBE LIVER WITH DRAINAGE DEVICE, PERCUTANEOUS APPROACH: ICD-10-PCS | Performed by: RADIOLOGY

## 2023-04-21 PROCEDURE — 84100 ASSAY OF PHOSPHORUS: CPT

## 2023-04-21 PROCEDURE — 96367 TX/PROPH/DG ADDL SEQ IV INF: CPT

## 2023-04-21 RX ORDER — NALOXONE HYDROCHLORIDE 0.4 MG/ML
0.4 INJECTION, SOLUTION INTRAMUSCULAR; INTRAVENOUS; SUBCUTANEOUS PRN
Status: DISCONTINUED | OUTPATIENT
Start: 2023-04-21 | End: 2023-04-27 | Stop reason: HOSPADM

## 2023-04-21 RX ORDER — SODIUM CHLORIDE 0.9 % (FLUSH) 0.9 %
5-40 SYRINGE (ML) INJECTION PRN
Status: DISCONTINUED | OUTPATIENT
Start: 2023-04-21 | End: 2023-04-27 | Stop reason: SDUPTHER

## 2023-04-21 RX ORDER — MORPHINE SULFATE 2 MG/ML
2 INJECTION, SOLUTION INTRAMUSCULAR; INTRAVENOUS
Status: DISCONTINUED | OUTPATIENT
Start: 2023-04-21 | End: 2023-04-23

## 2023-04-21 RX ORDER — HYDROXYZINE HYDROCHLORIDE 25 MG/1
50 TABLET, FILM COATED ORAL EVERY 8 HOURS PRN
Status: DISCONTINUED | OUTPATIENT
Start: 2023-04-21 | End: 2023-04-27 | Stop reason: HOSPADM

## 2023-04-21 RX ORDER — VANCOMYCIN 2 G/400ML
2000 INJECTION, SOLUTION INTRAVENOUS ONCE
Status: COMPLETED | OUTPATIENT
Start: 2023-04-21 | End: 2023-04-21

## 2023-04-21 RX ORDER — PANTOPRAZOLE SODIUM 40 MG/1
40 TABLET, DELAYED RELEASE ORAL DAILY
Status: DISCONTINUED | OUTPATIENT
Start: 2023-04-21 | End: 2023-04-27 | Stop reason: HOSPADM

## 2023-04-21 RX ORDER — LINEZOLID 2 MG/ML
600 INJECTION, SOLUTION INTRAVENOUS EVERY 12 HOURS
Status: DISCONTINUED | OUTPATIENT
Start: 2023-04-21 | End: 2023-04-21 | Stop reason: ALTCHOICE

## 2023-04-21 RX ORDER — LAMOTRIGINE 25 MG/1
25 TABLET ORAL 2 TIMES DAILY
Status: DISCONTINUED | OUTPATIENT
Start: 2023-04-21 | End: 2023-04-27 | Stop reason: HOSPADM

## 2023-04-21 RX ORDER — SODIUM CHLORIDE 9 MG/ML
INJECTION, SOLUTION INTRAVENOUS CONTINUOUS
Status: ACTIVE | OUTPATIENT
Start: 2023-04-21 | End: 2023-04-21

## 2023-04-21 RX ORDER — MORPHINE SULFATE 2 MG/ML
2 INJECTION, SOLUTION INTRAMUSCULAR; INTRAVENOUS EVERY 4 HOURS PRN
Status: DISCONTINUED | OUTPATIENT
Start: 2023-04-21 | End: 2023-04-21

## 2023-04-21 RX ORDER — MORPHINE SULFATE 4 MG/ML
4 INJECTION, SOLUTION INTRAMUSCULAR; INTRAVENOUS EVERY 4 HOURS PRN
Status: DISCONTINUED | OUTPATIENT
Start: 2023-04-21 | End: 2023-04-21

## 2023-04-21 RX ORDER — MIDAZOLAM HYDROCHLORIDE 1 MG/ML
INJECTION INTRAMUSCULAR; INTRAVENOUS PRN
Status: COMPLETED | OUTPATIENT
Start: 2023-04-21 | End: 2023-04-21

## 2023-04-21 RX ORDER — ENOXAPARIN SODIUM 100 MG/ML
30 INJECTION SUBCUTANEOUS 2 TIMES DAILY
Status: DISCONTINUED | OUTPATIENT
Start: 2023-04-21 | End: 2023-04-27 | Stop reason: HOSPADM

## 2023-04-21 RX ORDER — SODIUM CHLORIDE 0.9 % (FLUSH) 0.9 %
5-40 SYRINGE (ML) INJECTION EVERY 12 HOURS SCHEDULED
Status: DISCONTINUED | OUTPATIENT
Start: 2023-04-21 | End: 2023-04-27 | Stop reason: SDUPTHER

## 2023-04-21 RX ORDER — PANTOPRAZOLE SODIUM 40 MG/1
40 TABLET, DELAYED RELEASE ORAL DAILY
COMMUNITY

## 2023-04-21 RX ORDER — ACETAMINOPHEN 650 MG/1
650 SUPPOSITORY RECTAL EVERY 6 HOURS PRN
Status: DISCONTINUED | OUTPATIENT
Start: 2023-04-21 | End: 2023-04-27 | Stop reason: HOSPADM

## 2023-04-21 RX ORDER — FENTANYL CITRATE 50 UG/ML
INJECTION, SOLUTION INTRAMUSCULAR; INTRAVENOUS PRN
Status: COMPLETED | OUTPATIENT
Start: 2023-04-21 | End: 2023-04-21

## 2023-04-21 RX ORDER — LEVOTHYROXINE SODIUM 0.05 MG/1
50 TABLET ORAL DAILY
Status: DISCONTINUED | OUTPATIENT
Start: 2023-04-21 | End: 2023-04-27 | Stop reason: HOSPADM

## 2023-04-21 RX ORDER — SODIUM CHLORIDE 9 MG/ML
INJECTION, SOLUTION INTRAVENOUS PRN
Status: DISCONTINUED | OUTPATIENT
Start: 2023-04-21 | End: 2023-04-24

## 2023-04-21 RX ORDER — SIMETHICONE 80 MG
80 TABLET,CHEWABLE ORAL EVERY 6 HOURS PRN
Status: DISCONTINUED | OUTPATIENT
Start: 2023-04-21 | End: 2023-04-27 | Stop reason: HOSPADM

## 2023-04-21 RX ORDER — FOLIC ACID 1 MG/1
1 TABLET ORAL DAILY
Status: DISCONTINUED | OUTPATIENT
Start: 2023-04-21 | End: 2023-04-27 | Stop reason: HOSPADM

## 2023-04-21 RX ORDER — DEXTROAMPHETAMINE SACCHARATE, AMPHETAMINE ASPARTATE, DEXTROAMPHETAMINE SULFATE AND AMPHETAMINE SULFATE 2.5; 2.5; 2.5; 2.5 MG/1; MG/1; MG/1; MG/1
10 TABLET ORAL 2 TIMES DAILY
Status: DISCONTINUED | OUTPATIENT
Start: 2023-04-21 | End: 2023-04-27 | Stop reason: HOSPADM

## 2023-04-21 RX ORDER — MORPHINE SULFATE 4 MG/ML
4 INJECTION, SOLUTION INTRAMUSCULAR; INTRAVENOUS
Status: DISCONTINUED | OUTPATIENT
Start: 2023-04-21 | End: 2023-04-25

## 2023-04-21 RX ORDER — ACETAMINOPHEN 325 MG/1
650 TABLET ORAL ONCE
Status: COMPLETED | OUTPATIENT
Start: 2023-04-21 | End: 2023-04-21

## 2023-04-21 RX ORDER — ACETAMINOPHEN 325 MG/1
650 TABLET ORAL EVERY 6 HOURS PRN
Status: DISCONTINUED | OUTPATIENT
Start: 2023-04-21 | End: 2023-04-27 | Stop reason: HOSPADM

## 2023-04-21 RX ADMIN — ENOXAPARIN SODIUM 30 MG: 100 INJECTION SUBCUTANEOUS at 22:29

## 2023-04-21 RX ADMIN — ONDANSETRON 4 MG: 2 INJECTION INTRAMUSCULAR; INTRAVENOUS at 00:42

## 2023-04-21 RX ADMIN — VANCOMYCIN HYDROCHLORIDE 1000 MG: 1 INJECTION, POWDER, LYOPHILIZED, FOR SOLUTION INTRAVENOUS at 12:03

## 2023-04-21 RX ADMIN — MORPHINE SULFATE 2 MG: 2 INJECTION, SOLUTION INTRAMUSCULAR; INTRAVENOUS at 06:16

## 2023-04-21 RX ADMIN — LAMOTRIGINE 25 MG: 25 TABLET ORAL at 22:29

## 2023-04-21 RX ADMIN — MIDAZOLAM 1 MG: 1 INJECTION INTRAMUSCULAR; INTRAVENOUS at 11:06

## 2023-04-21 RX ADMIN — FENTANYL CITRATE 50 MCG: 50 INJECTION INTRAMUSCULAR; INTRAVENOUS at 11:06

## 2023-04-21 RX ADMIN — LEVOTHYROXINE SODIUM 50 MCG: 0.05 TABLET ORAL at 06:16

## 2023-04-21 RX ADMIN — CARIPRAZINE 1.5 MG: 1.5 CAPSULE, GELATIN COATED ORAL at 22:29

## 2023-04-21 RX ADMIN — SODIUM CHLORIDE: 9 INJECTION, SOLUTION INTRAVENOUS at 04:34

## 2023-04-21 RX ADMIN — IOPAMIDOL 75 ML: 755 INJECTION, SOLUTION INTRAVENOUS at 01:20

## 2023-04-21 RX ADMIN — FENTANYL CITRATE 50 MCG: 50 INJECTION INTRAMUSCULAR; INTRAVENOUS at 11:16

## 2023-04-21 RX ADMIN — PANTOPRAZOLE SODIUM 40 MG: 40 TABLET, DELAYED RELEASE ORAL at 06:16

## 2023-04-21 RX ADMIN — MIDAZOLAM 1 MG: 1 INJECTION INTRAMUSCULAR; INTRAVENOUS at 11:16

## 2023-04-21 RX ADMIN — LAMOTRIGINE 25 MG: 25 TABLET ORAL at 09:17

## 2023-04-21 RX ADMIN — PIPERACILLIN AND TAZOBACTAM 4500 MG: 4; .5 INJECTION, POWDER, LYOPHILIZED, FOR SOLUTION INTRAVENOUS at 16:19

## 2023-04-21 RX ADMIN — PIPERACILLIN AND TAZOBACTAM 3375 MG: 3; .375 INJECTION, POWDER, LYOPHILIZED, FOR SOLUTION INTRAVENOUS at 00:46

## 2023-04-21 RX ADMIN — FOLIC ACID 1 MG: 1 TABLET ORAL at 09:17

## 2023-04-21 RX ADMIN — SIMETHICONE 80 MG: 80 TABLET, CHEWABLE ORAL at 16:16

## 2023-04-21 RX ADMIN — HYDROMORPHONE HYDROCHLORIDE 1 MG: 1 INJECTION, SOLUTION INTRAMUSCULAR; INTRAVENOUS; SUBCUTANEOUS at 00:44

## 2023-04-21 RX ADMIN — MORPHINE SULFATE 4 MG: 4 INJECTION, SOLUTION INTRAMUSCULAR; INTRAVENOUS at 10:11

## 2023-04-21 RX ADMIN — MORPHINE SULFATE 4 MG: 4 INJECTION, SOLUTION INTRAMUSCULAR; INTRAVENOUS at 12:16

## 2023-04-21 RX ADMIN — MORPHINE SULFATE 4 MG: 4 INJECTION, SOLUTION INTRAMUSCULAR; INTRAVENOUS at 18:34

## 2023-04-21 RX ADMIN — SODIUM CHLORIDE 3618 ML: 9 INJECTION, SOLUTION INTRAVENOUS at 00:40

## 2023-04-21 RX ADMIN — ACETAMINOPHEN 650 MG: 325 TABLET ORAL at 00:57

## 2023-04-21 RX ADMIN — MORPHINE SULFATE 4 MG: 4 INJECTION, SOLUTION INTRAMUSCULAR; INTRAVENOUS at 22:30

## 2023-04-21 RX ADMIN — MORPHINE SULFATE 4 MG: 4 INJECTION, SOLUTION INTRAMUSCULAR; INTRAVENOUS at 14:29

## 2023-04-21 RX ADMIN — SODIUM CHLORIDE, PRESERVATIVE FREE 10 ML: 5 INJECTION INTRAVENOUS at 22:30

## 2023-04-21 RX ADMIN — SODIUM CHLORIDE: 9 INJECTION, SOLUTION INTRAVENOUS at 14:31

## 2023-04-21 RX ADMIN — VANCOMYCIN 2000 MG: 2 INJECTION, SOLUTION INTRAVENOUS at 01:43

## 2023-04-21 RX ADMIN — PIPERACILLIN AND TAZOBACTAM 3375 MG: 3; .375 INJECTION, POWDER, LYOPHILIZED, FOR SOLUTION INTRAVENOUS at 09:22

## 2023-04-21 ASSESSMENT — PAIN DESCRIPTION - ORIENTATION
ORIENTATION: UPPER;RIGHT
ORIENTATION: UPPER;RIGHT
ORIENTATION: RIGHT;UPPER
ORIENTATION: RIGHT;MID;UPPER
ORIENTATION: MID
ORIENTATION: RIGHT;MID
ORIENTATION: RIGHT;LEFT

## 2023-04-21 ASSESSMENT — PAIN DESCRIPTION - LOCATION
LOCATION: ABDOMEN

## 2023-04-21 ASSESSMENT — PAIN SCALES - GENERAL
PAINLEVEL_OUTOF10: 3
PAINLEVEL_OUTOF10: 7
PAINLEVEL_OUTOF10: 7
PAINLEVEL_OUTOF10: 4
PAINLEVEL_OUTOF10: 3
PAINLEVEL_OUTOF10: 3
PAINLEVEL_OUTOF10: 4
PAINLEVEL_OUTOF10: 4
PAINLEVEL_OUTOF10: 7
PAINLEVEL_OUTOF10: 8
PAINLEVEL_OUTOF10: 4
PAINLEVEL_OUTOF10: 6
PAINLEVEL_OUTOF10: 8
PAINLEVEL_OUTOF10: 7

## 2023-04-21 ASSESSMENT — PAIN DESCRIPTION - DESCRIPTORS
DESCRIPTORS: ACHING
DESCRIPTORS: ACHING;DISCOMFORT
DESCRIPTORS: ACHING;STABBING
DESCRIPTORS: ACHING;DISCOMFORT
DESCRIPTORS: STABBING
DESCRIPTORS: ACHING;CRAMPING;DISCOMFORT
DESCRIPTORS: CRAMPING;SHARP

## 2023-04-21 ASSESSMENT — ENCOUNTER SYMPTOMS
COLOR CHANGE: 0
VOMITING: 0
SHORTNESS OF BREATH: 0
ABDOMINAL PAIN: 1
NAUSEA: 1

## 2023-04-21 ASSESSMENT — PAIN - FUNCTIONAL ASSESSMENT
PAIN_FUNCTIONAL_ASSESSMENT: PREVENTS OR INTERFERES SOME ACTIVE ACTIVITIES AND ADLS
PAIN_FUNCTIONAL_ASSESSMENT: ACTIVITIES ARE NOT PREVENTED
PAIN_FUNCTIONAL_ASSESSMENT: PREVENTS OR INTERFERES SOME ACTIVE ACTIVITIES AND ADLS
PAIN_FUNCTIONAL_ASSESSMENT: PREVENTS OR INTERFERES SOME ACTIVE ACTIVITIES AND ADLS

## 2023-04-21 ASSESSMENT — PAIN DESCRIPTION - PAIN TYPE: TYPE: SURGICAL PAIN

## 2023-04-21 ASSESSMENT — LIFESTYLE VARIABLES
HOW OFTEN DO YOU HAVE A DRINK CONTAINING ALCOHOL: NEVER
HOW MANY STANDARD DRINKS CONTAINING ALCOHOL DO YOU HAVE ON A TYPICAL DAY: PATIENT DOES NOT DRINK

## 2023-04-22 PROBLEM — R65.20 SEVERE SEPSIS (HCC): Status: ACTIVE | Noted: 2023-04-22

## 2023-04-22 PROBLEM — Z90.49 HISTORY OF LAPAROSCOPIC CHOLECYSTECTOMY: Status: ACTIVE | Noted: 2023-04-22

## 2023-04-22 PROBLEM — E66.01 MORBID OBESITY WITH BMI OF 50.0-59.9, ADULT (HCC): Status: ACTIVE | Noted: 2023-04-22

## 2023-04-22 PROBLEM — A41.9 SEPTICEMIA (HCC): Status: ACTIVE | Noted: 2023-04-22

## 2023-04-22 PROBLEM — A41.9 SEVERE SEPSIS (HCC): Status: ACTIVE | Noted: 2023-04-22

## 2023-04-22 PROBLEM — D72.9 NEUTROPHILIA: Status: ACTIVE | Noted: 2023-04-22

## 2023-04-22 PROBLEM — A49.1 ENTEROCOCCAL INFECTION: Status: ACTIVE | Noted: 2023-04-22

## 2023-04-22 PROBLEM — R50.9 FEVER AND CHILLS: Status: ACTIVE | Noted: 2023-04-22

## 2023-04-22 LAB
ALBUMIN SERPL-MCNC: 1.7 G/DL (ref 3.4–5)
ALBUMIN/GLOB SERPL: 0.3 {RATIO} (ref 1.1–2.2)
ALP SERPL-CCNC: 174 U/L (ref 40–129)
ALT SERPL-CCNC: 26 U/L (ref 10–40)
ANION GAP SERPL CALCULATED.3IONS-SCNC: 15 MMOL/L (ref 3–16)
AST SERPL-CCNC: 36 U/L (ref 15–37)
BACTERIA FLD AEROBE CULT: ABNORMAL
BACTERIA FLD AEROBE CULT: ABNORMAL
BASOPHILS # BLD: 0.1 K/UL (ref 0–0.2)
BASOPHILS NFR BLD: 0.3 %
BILIRUB SERPL-MCNC: 0.8 MG/DL (ref 0–1)
BUN SERPL-MCNC: 7 MG/DL (ref 7–20)
CALCIUM SERPL-MCNC: 7.7 MG/DL (ref 8.3–10.6)
CHLORIDE SERPL-SCNC: 95 MMOL/L (ref 99–110)
CO2 SERPL-SCNC: 21 MMOL/L (ref 21–32)
CREAT SERPL-MCNC: 0.6 MG/DL (ref 0.6–1.1)
CRP SERPL-MCNC: 299.2 MG/L (ref 0–5.1)
DEPRECATED RDW RBC AUTO: 16.2 % (ref 12.4–15.4)
EOSINOPHIL # BLD: 0.1 K/UL (ref 0–0.6)
EOSINOPHIL NFR BLD: 0.5 %
ERYTHROCYTE [SEDIMENTATION RATE] IN BLOOD BY WESTERGREN METHOD: 130 MM/HR (ref 0–20)
GFR SERPLBLD CREATININE-BSD FMLA CKD-EPI: >60 ML/MIN/{1.73_M2}
GLUCOSE SERPL-MCNC: 129 MG/DL (ref 70–99)
GRAM STN SPEC: ABNORMAL
HCT VFR BLD AUTO: 26.5 % (ref 36–48)
HGB BLD-MCNC: 8.5 G/DL (ref 12–16)
LOEFFLER MB STN SPEC: NORMAL
LYMPHOCYTES # BLD: 1.6 K/UL (ref 1–5.1)
LYMPHOCYTES NFR BLD: 8.4 %
MAGNESIUM SERPL-MCNC: 2.5 MG/DL (ref 1.8–2.4)
MCH RBC QN AUTO: 29.6 PG (ref 26–34)
MCHC RBC AUTO-ENTMCNC: 32 G/DL (ref 31–36)
MCV RBC AUTO: 92.5 FL (ref 80–100)
MONOCYTES # BLD: 0.7 K/UL (ref 0–1.3)
MONOCYTES NFR BLD: 3.5 %
NEUTROPHILS # BLD: 16.8 K/UL (ref 1.7–7.7)
NEUTROPHILS NFR BLD: 87.3 %
ORGANISM: ABNORMAL
ORGANISM: ABNORMAL
PHOSPHATE SERPL-MCNC: 2.8 MG/DL (ref 2.5–4.9)
PLATELET # BLD AUTO: 537 K/UL (ref 135–450)
PMV BLD AUTO: 7.6 FL (ref 5–10.5)
POTASSIUM SERPL-SCNC: 4 MMOL/L (ref 3.5–5.1)
PROCALCITONIN SERPL IA-MCNC: 0.94 NG/ML (ref 0–0.15)
PROT SERPL-MCNC: 7.1 G/DL (ref 6.4–8.2)
RBC # BLD AUTO: 2.86 M/UL (ref 4–5.2)
SODIUM SERPL-SCNC: 131 MMOL/L (ref 136–145)
WBC # BLD AUTO: 19.2 K/UL (ref 4–11)

## 2023-04-22 PROCEDURE — 6370000000 HC RX 637 (ALT 250 FOR IP): Performed by: STUDENT IN AN ORGANIZED HEALTH CARE EDUCATION/TRAINING PROGRAM

## 2023-04-22 PROCEDURE — 2580000003 HC RX 258: Performed by: INTERNAL MEDICINE

## 2023-04-22 PROCEDURE — 2580000003 HC RX 258: Performed by: STUDENT IN AN ORGANIZED HEALTH CARE EDUCATION/TRAINING PROGRAM

## 2023-04-22 PROCEDURE — 86140 C-REACTIVE PROTEIN: CPT

## 2023-04-22 PROCEDURE — 36415 COLL VENOUS BLD VENIPUNCTURE: CPT

## 2023-04-22 PROCEDURE — 83735 ASSAY OF MAGNESIUM: CPT

## 2023-04-22 PROCEDURE — 80053 COMPREHEN METABOLIC PANEL: CPT

## 2023-04-22 PROCEDURE — 1200000000 HC SEMI PRIVATE

## 2023-04-22 PROCEDURE — 99024 POSTOP FOLLOW-UP VISIT: CPT | Performed by: SURGERY

## 2023-04-22 PROCEDURE — 6360000002 HC RX W HCPCS: Performed by: SURGERY

## 2023-04-22 PROCEDURE — 84145 PROCALCITONIN (PCT): CPT

## 2023-04-22 PROCEDURE — 6360000002 HC RX W HCPCS: Performed by: INTERNAL MEDICINE

## 2023-04-22 PROCEDURE — 6360000002 HC RX W HCPCS: Performed by: STUDENT IN AN ORGANIZED HEALTH CARE EDUCATION/TRAINING PROGRAM

## 2023-04-22 PROCEDURE — 85025 COMPLETE CBC W/AUTO DIFF WBC: CPT

## 2023-04-22 PROCEDURE — 85652 RBC SED RATE AUTOMATED: CPT

## 2023-04-22 PROCEDURE — 84100 ASSAY OF PHOSPHORUS: CPT

## 2023-04-22 PROCEDURE — 6370000000 HC RX 637 (ALT 250 FOR IP): Performed by: PHYSICIAN ASSISTANT

## 2023-04-22 RX ADMIN — ENOXAPARIN SODIUM 30 MG: 100 INJECTION SUBCUTANEOUS at 08:37

## 2023-04-22 RX ADMIN — SODIUM CHLORIDE, PRESERVATIVE FREE 10 ML: 5 INJECTION INTRAVENOUS at 09:00

## 2023-04-22 RX ADMIN — ENOXAPARIN SODIUM 30 MG: 100 INJECTION SUBCUTANEOUS at 22:07

## 2023-04-22 RX ADMIN — MORPHINE SULFATE 4 MG: 4 INJECTION, SOLUTION INTRAMUSCULAR; INTRAVENOUS at 22:06

## 2023-04-22 RX ADMIN — SODIUM CHLORIDE, PRESERVATIVE FREE 10 ML: 5 INJECTION INTRAVENOUS at 22:07

## 2023-04-22 RX ADMIN — PIPERACILLIN AND TAZOBACTAM 4500 MG: 4; .5 INJECTION, POWDER, LYOPHILIZED, FOR SOLUTION INTRAVENOUS at 08:43

## 2023-04-22 RX ADMIN — ACETAMINOPHEN 650 MG: 325 TABLET ORAL at 00:54

## 2023-04-22 RX ADMIN — MORPHINE SULFATE 4 MG: 4 INJECTION, SOLUTION INTRAMUSCULAR; INTRAVENOUS at 11:16

## 2023-04-22 RX ADMIN — CARIPRAZINE 1.5 MG: 1.5 CAPSULE, GELATIN COATED ORAL at 22:06

## 2023-04-22 RX ADMIN — LEVOTHYROXINE SODIUM 50 MCG: 0.05 TABLET ORAL at 05:45

## 2023-04-22 RX ADMIN — MORPHINE SULFATE 4 MG: 4 INJECTION, SOLUTION INTRAMUSCULAR; INTRAVENOUS at 13:44

## 2023-04-22 RX ADMIN — PIPERACILLIN AND TAZOBACTAM 4500 MG: 4; .5 INJECTION, POWDER, LYOPHILIZED, FOR SOLUTION INTRAVENOUS at 00:58

## 2023-04-22 RX ADMIN — MORPHINE SULFATE 2 MG: 2 INJECTION, SOLUTION INTRAMUSCULAR; INTRAVENOUS at 02:39

## 2023-04-22 RX ADMIN — PIPERACILLIN AND TAZOBACTAM 4500 MG: 4; .5 INJECTION, POWDER, LYOPHILIZED, FOR SOLUTION INTRAVENOUS at 16:18

## 2023-04-22 RX ADMIN — LAMOTRIGINE 25 MG: 25 TABLET ORAL at 22:06

## 2023-04-22 RX ADMIN — LAMOTRIGINE 25 MG: 25 TABLET ORAL at 08:37

## 2023-04-22 RX ADMIN — FOLIC ACID 1 MG: 1 TABLET ORAL at 08:37

## 2023-04-22 RX ADMIN — MORPHINE SULFATE 4 MG: 4 INJECTION, SOLUTION INTRAMUSCULAR; INTRAVENOUS at 16:12

## 2023-04-22 RX ADMIN — SIMETHICONE 80 MG: 80 TABLET, CHEWABLE ORAL at 11:15

## 2023-04-22 RX ADMIN — PANTOPRAZOLE SODIUM 40 MG: 40 TABLET, DELAYED RELEASE ORAL at 05:45

## 2023-04-22 RX ADMIN — MORPHINE SULFATE 4 MG: 4 INJECTION, SOLUTION INTRAMUSCULAR; INTRAVENOUS at 08:34

## 2023-04-22 RX ADMIN — SODIUM CHLORIDE: 9 INJECTION, SOLUTION INTRAVENOUS at 00:57

## 2023-04-22 RX ADMIN — MORPHINE SULFATE 4 MG: 4 INJECTION, SOLUTION INTRAMUSCULAR; INTRAVENOUS at 19:09

## 2023-04-22 RX ADMIN — MORPHINE SULFATE 4 MG: 4 INJECTION, SOLUTION INTRAMUSCULAR; INTRAVENOUS at 05:45

## 2023-04-22 ASSESSMENT — PAIN DESCRIPTION - DESCRIPTORS
DESCRIPTORS: DULL
DESCRIPTORS: ACHING;SHOOTING;SHARP;STABBING
DESCRIPTORS: SHARP
DESCRIPTORS: STABBING

## 2023-04-22 ASSESSMENT — PAIN SCALES - GENERAL
PAINLEVEL_OUTOF10: 6
PAINLEVEL_OUTOF10: 5
PAINLEVEL_OUTOF10: 7
PAINLEVEL_OUTOF10: 3
PAINLEVEL_OUTOF10: 6
PAINLEVEL_OUTOF10: 7
PAINLEVEL_OUTOF10: 7
PAINLEVEL_OUTOF10: 3
PAINLEVEL_OUTOF10: 7
PAINLEVEL_OUTOF10: 3
PAINLEVEL_OUTOF10: 3
PAINLEVEL_OUTOF10: 7
PAINLEVEL_OUTOF10: 6
PAINLEVEL_OUTOF10: 7

## 2023-04-22 ASSESSMENT — PAIN DESCRIPTION - LOCATION
LOCATION: ABDOMEN

## 2023-04-22 ASSESSMENT — PAIN SCALES - WONG BAKER
WONGBAKER_NUMERICALRESPONSE: 0

## 2023-04-22 ASSESSMENT — PAIN DESCRIPTION - ORIENTATION
ORIENTATION: RIGHT;UPPER
ORIENTATION: RIGHT
ORIENTATION: UPPER;RIGHT

## 2023-04-22 ASSESSMENT — PAIN - FUNCTIONAL ASSESSMENT
PAIN_FUNCTIONAL_ASSESSMENT: PREVENTS OR INTERFERES SOME ACTIVE ACTIVITIES AND ADLS

## 2023-04-22 ASSESSMENT — PAIN DESCRIPTION - PAIN TYPE: TYPE: SURGICAL PAIN

## 2023-04-23 LAB
ALBUMIN SERPL-MCNC: 1.9 G/DL (ref 3.4–5)
ALBUMIN/GLOB SERPL: 0.4 {RATIO} (ref 1.1–2.2)
ALP SERPL-CCNC: 169 U/L (ref 40–129)
ALT SERPL-CCNC: 20 U/L (ref 10–40)
ANION GAP SERPL CALCULATED.3IONS-SCNC: 15 MMOL/L (ref 3–16)
AST SERPL-CCNC: 24 U/L (ref 15–37)
BASOPHILS # BLD: 0.1 K/UL (ref 0–0.2)
BASOPHILS NFR BLD: 0.5 %
BILIRUB SERPL-MCNC: 0.9 MG/DL (ref 0–1)
BUN SERPL-MCNC: 4 MG/DL (ref 7–20)
CALCIUM SERPL-MCNC: 7.8 MG/DL (ref 8.3–10.6)
CHLORIDE SERPL-SCNC: 93 MMOL/L (ref 99–110)
CO2 SERPL-SCNC: 23 MMOL/L (ref 21–32)
CREAT SERPL-MCNC: 0.6 MG/DL (ref 0.6–1.1)
DEPRECATED RDW RBC AUTO: 15.9 % (ref 12.4–15.4)
EOSINOPHIL # BLD: 0.2 K/UL (ref 0–0.6)
EOSINOPHIL NFR BLD: 0.8 %
GFR SERPLBLD CREATININE-BSD FMLA CKD-EPI: >60 ML/MIN/{1.73_M2}
GLUCOSE SERPL-MCNC: 148 MG/DL (ref 70–99)
HCT VFR BLD AUTO: 25.8 % (ref 36–48)
HGB BLD-MCNC: 8.3 G/DL (ref 12–16)
LYMPHOCYTES # BLD: 2 K/UL (ref 1–5.1)
LYMPHOCYTES NFR BLD: 8.7 %
MAGNESIUM SERPL-MCNC: 2.5 MG/DL (ref 1.8–2.4)
MCH RBC QN AUTO: 28.9 PG (ref 26–34)
MCHC RBC AUTO-ENTMCNC: 32.3 G/DL (ref 31–36)
MCV RBC AUTO: 89.6 FL (ref 80–100)
MONOCYTES # BLD: 0.8 K/UL (ref 0–1.3)
MONOCYTES NFR BLD: 3.3 %
NEUTROPHILS # BLD: 19.6 K/UL (ref 1.7–7.7)
NEUTROPHILS NFR BLD: 86.7 %
PHOSPHATE SERPL-MCNC: 2.5 MG/DL (ref 2.5–4.9)
PLATELET # BLD AUTO: 740 K/UL (ref 135–450)
PMV BLD AUTO: 7.1 FL (ref 5–10.5)
POTASSIUM SERPL-SCNC: 3.5 MMOL/L (ref 3.5–5.1)
PROT SERPL-MCNC: 6.8 G/DL (ref 6.4–8.2)
RBC # BLD AUTO: 2.88 M/UL (ref 4–5.2)
SODIUM SERPL-SCNC: 131 MMOL/L (ref 136–145)
WBC # BLD AUTO: 22.6 K/UL (ref 4–11)

## 2023-04-23 PROCEDURE — 6360000002 HC RX W HCPCS: Performed by: SURGERY

## 2023-04-23 PROCEDURE — 99024 POSTOP FOLLOW-UP VISIT: CPT | Performed by: SURGERY

## 2023-04-23 PROCEDURE — 84100 ASSAY OF PHOSPHORUS: CPT

## 2023-04-23 PROCEDURE — 1200000000 HC SEMI PRIVATE

## 2023-04-23 PROCEDURE — 85025 COMPLETE CBC W/AUTO DIFF WBC: CPT

## 2023-04-23 PROCEDURE — 2580000003 HC RX 258: Performed by: INTERNAL MEDICINE

## 2023-04-23 PROCEDURE — 94760 N-INVAS EAR/PLS OXIMETRY 1: CPT

## 2023-04-23 PROCEDURE — 6370000000 HC RX 637 (ALT 250 FOR IP): Performed by: INTERNAL MEDICINE

## 2023-04-23 PROCEDURE — 6360000002 HC RX W HCPCS: Performed by: INTERNAL MEDICINE

## 2023-04-23 PROCEDURE — 80053 COMPREHEN METABOLIC PANEL: CPT

## 2023-04-23 PROCEDURE — 6370000000 HC RX 637 (ALT 250 FOR IP): Performed by: STUDENT IN AN ORGANIZED HEALTH CARE EDUCATION/TRAINING PROGRAM

## 2023-04-23 PROCEDURE — 6360000002 HC RX W HCPCS: Performed by: STUDENT IN AN ORGANIZED HEALTH CARE EDUCATION/TRAINING PROGRAM

## 2023-04-23 PROCEDURE — 83735 ASSAY OF MAGNESIUM: CPT

## 2023-04-23 RX ORDER — TRAMADOL HYDROCHLORIDE 50 MG/1
25 TABLET ORAL EVERY 6 HOURS PRN
Status: DISCONTINUED | OUTPATIENT
Start: 2023-04-23 | End: 2023-04-24

## 2023-04-23 RX ADMIN — MORPHINE SULFATE 4 MG: 4 INJECTION, SOLUTION INTRAMUSCULAR; INTRAVENOUS at 10:54

## 2023-04-23 RX ADMIN — MORPHINE SULFATE 4 MG: 4 INJECTION, SOLUTION INTRAMUSCULAR; INTRAVENOUS at 21:02

## 2023-04-23 RX ADMIN — LEVOTHYROXINE SODIUM 50 MCG: 0.05 TABLET ORAL at 06:52

## 2023-04-23 RX ADMIN — TRAMADOL HYDROCHLORIDE 25 MG: 50 TABLET ORAL at 16:51

## 2023-04-23 RX ADMIN — LAMOTRIGINE 25 MG: 25 TABLET ORAL at 08:47

## 2023-04-23 RX ADMIN — MORPHINE SULFATE 4 MG: 4 INJECTION, SOLUTION INTRAMUSCULAR; INTRAVENOUS at 04:30

## 2023-04-23 RX ADMIN — MORPHINE SULFATE 4 MG: 4 INJECTION, SOLUTION INTRAMUSCULAR; INTRAVENOUS at 00:03

## 2023-04-23 RX ADMIN — PIPERACILLIN AND TAZOBACTAM 4500 MG: 4; .5 INJECTION, POWDER, LYOPHILIZED, FOR SOLUTION INTRAVENOUS at 00:09

## 2023-04-23 RX ADMIN — ENOXAPARIN SODIUM 30 MG: 100 INJECTION SUBCUTANEOUS at 08:49

## 2023-04-23 RX ADMIN — PIPERACILLIN AND TAZOBACTAM 4500 MG: 4; .5 INJECTION, POWDER, LYOPHILIZED, FOR SOLUTION INTRAVENOUS at 23:58

## 2023-04-23 RX ADMIN — MORPHINE SULFATE 4 MG: 4 INJECTION, SOLUTION INTRAMUSCULAR; INTRAVENOUS at 23:42

## 2023-04-23 RX ADMIN — CARIPRAZINE 1.5 MG: 1.5 CAPSULE, GELATIN COATED ORAL at 21:03

## 2023-04-23 RX ADMIN — ENOXAPARIN SODIUM 30 MG: 100 INJECTION SUBCUTANEOUS at 21:03

## 2023-04-23 RX ADMIN — FOLIC ACID 1 MG: 1 TABLET ORAL at 08:47

## 2023-04-23 RX ADMIN — PIPERACILLIN AND TAZOBACTAM 4500 MG: 4; .5 INJECTION, POWDER, LYOPHILIZED, FOR SOLUTION INTRAVENOUS at 15:36

## 2023-04-23 RX ADMIN — PANTOPRAZOLE SODIUM 40 MG: 40 TABLET, DELAYED RELEASE ORAL at 06:52

## 2023-04-23 RX ADMIN — MORPHINE SULFATE 4 MG: 4 INJECTION, SOLUTION INTRAMUSCULAR; INTRAVENOUS at 14:13

## 2023-04-23 RX ADMIN — PIPERACILLIN AND TAZOBACTAM 4500 MG: 4; .5 INJECTION, POWDER, LYOPHILIZED, FOR SOLUTION INTRAVENOUS at 08:53

## 2023-04-23 RX ADMIN — LAMOTRIGINE 25 MG: 25 TABLET ORAL at 21:03

## 2023-04-23 RX ADMIN — MORPHINE SULFATE 4 MG: 4 INJECTION, SOLUTION INTRAMUSCULAR; INTRAVENOUS at 08:45

## 2023-04-23 ASSESSMENT — PAIN - FUNCTIONAL ASSESSMENT
PAIN_FUNCTIONAL_ASSESSMENT: PREVENTS OR INTERFERES SOME ACTIVE ACTIVITIES AND ADLS
PAIN_FUNCTIONAL_ASSESSMENT: PREVENTS OR INTERFERES SOME ACTIVE ACTIVITIES AND ADLS

## 2023-04-23 ASSESSMENT — PAIN DESCRIPTION - ORIENTATION
ORIENTATION: LOWER;RIGHT
ORIENTATION: RIGHT;LOWER
ORIENTATION: RIGHT;LOWER

## 2023-04-23 ASSESSMENT — PAIN SCALES - GENERAL
PAINLEVEL_OUTOF10: 7
PAINLEVEL_OUTOF10: 0
PAINLEVEL_OUTOF10: 6
PAINLEVEL_OUTOF10: 3
PAINLEVEL_OUTOF10: 6
PAINLEVEL_OUTOF10: 7
PAINLEVEL_OUTOF10: 6
PAINLEVEL_OUTOF10: 7
PAINLEVEL_OUTOF10: 6
PAINLEVEL_OUTOF10: 8
PAINLEVEL_OUTOF10: 8
PAINLEVEL_OUTOF10: 0
PAINLEVEL_OUTOF10: 6
PAINLEVEL_OUTOF10: 6
PAINLEVEL_OUTOF10: 7

## 2023-04-23 ASSESSMENT — PAIN DESCRIPTION - LOCATION
LOCATION: ABDOMEN

## 2023-04-23 ASSESSMENT — PAIN DESCRIPTION - DESCRIPTORS
DESCRIPTORS: ACHING
DESCRIPTORS: SHARP
DESCRIPTORS: SHARP
DESCRIPTORS: ACHING

## 2023-04-23 ASSESSMENT — PAIN SCALES - WONG BAKER
WONGBAKER_NUMERICALRESPONSE: 0

## 2023-04-23 ASSESSMENT — PAIN DESCRIPTION - PAIN TYPE: TYPE: SURGICAL PAIN

## 2023-04-23 ASSESSMENT — ENCOUNTER SYMPTOMS: NAUSEA: 1

## 2023-04-24 ENCOUNTER — APPOINTMENT (OUTPATIENT)
Dept: CT IMAGING | Age: 38
DRG: 721 | End: 2023-04-24
Payer: MEDICAID

## 2023-04-24 LAB
ALBUMIN SERPL-MCNC: 1.8 G/DL (ref 3.4–5)
ALBUMIN/GLOB SERPL: 0.4 {RATIO} (ref 1.1–2.2)
ALP SERPL-CCNC: 156 U/L (ref 40–129)
ALT SERPL-CCNC: 8 U/L (ref 10–40)
ANION GAP SERPL CALCULATED.3IONS-SCNC: 12 MMOL/L (ref 3–16)
AST SERPL-CCNC: 18 U/L (ref 15–37)
BASOPHILS # BLD: 0.1 K/UL (ref 0–0.2)
BASOPHILS NFR BLD: 0.6 %
BILIRUB SERPL-MCNC: 0.6 MG/DL (ref 0–1)
BUN SERPL-MCNC: 3 MG/DL (ref 7–20)
CALCIUM SERPL-MCNC: 7.7 MG/DL (ref 8.3–10.6)
CHLORIDE SERPL-SCNC: 96 MMOL/L (ref 99–110)
CO2 SERPL-SCNC: 26 MMOL/L (ref 21–32)
CREAT SERPL-MCNC: 0.5 MG/DL (ref 0.6–1.1)
DEPRECATED RDW RBC AUTO: 15.8 % (ref 12.4–15.4)
EOSINOPHIL # BLD: 0.2 K/UL (ref 0–0.6)
EOSINOPHIL NFR BLD: 1.1 %
GFR SERPLBLD CREATININE-BSD FMLA CKD-EPI: >60 ML/MIN/{1.73_M2}
GLUCOSE SERPL-MCNC: 136 MG/DL (ref 70–99)
HCT VFR BLD AUTO: 26.2 % (ref 36–48)
HGB BLD-MCNC: 8.1 G/DL (ref 12–16)
LYMPHOCYTES # BLD: 2.1 K/UL (ref 1–5.1)
LYMPHOCYTES NFR BLD: 13 %
MAGNESIUM SERPL-MCNC: 2.6 MG/DL (ref 1.8–2.4)
MCH RBC QN AUTO: 27.7 PG (ref 26–34)
MCHC RBC AUTO-ENTMCNC: 30.9 G/DL (ref 31–36)
MCV RBC AUTO: 89.8 FL (ref 80–100)
MONOCYTES # BLD: 0.6 K/UL (ref 0–1.3)
MONOCYTES NFR BLD: 4 %
NEUTROPHILS # BLD: 13 K/UL (ref 1.7–7.7)
NEUTROPHILS NFR BLD: 81.3 %
PHOSPHATE SERPL-MCNC: 2.6 MG/DL (ref 2.5–4.9)
PLATELET # BLD AUTO: 840 K/UL (ref 135–450)
PMV BLD AUTO: 6.6 FL (ref 5–10.5)
POTASSIUM SERPL-SCNC: 3.6 MMOL/L (ref 3.5–5.1)
PROT SERPL-MCNC: 6.6 G/DL (ref 6.4–8.2)
RBC # BLD AUTO: 2.91 M/UL (ref 4–5.2)
SODIUM SERPL-SCNC: 134 MMOL/L (ref 136–145)
WBC # BLD AUTO: 16 K/UL (ref 4–11)

## 2023-04-24 PROCEDURE — 02HV33Z INSERTION OF INFUSION DEVICE INTO SUPERIOR VENA CAVA, PERCUTANEOUS APPROACH: ICD-10-PCS | Performed by: INTERNAL MEDICINE

## 2023-04-24 PROCEDURE — 2580000003 HC RX 258: Performed by: STUDENT IN AN ORGANIZED HEALTH CARE EDUCATION/TRAINING PROGRAM

## 2023-04-24 PROCEDURE — 6370000000 HC RX 637 (ALT 250 FOR IP): Performed by: STUDENT IN AN ORGANIZED HEALTH CARE EDUCATION/TRAINING PROGRAM

## 2023-04-24 PROCEDURE — 36415 COLL VENOUS BLD VENIPUNCTURE: CPT

## 2023-04-24 PROCEDURE — 6360000002 HC RX W HCPCS: Performed by: INTERNAL MEDICINE

## 2023-04-24 PROCEDURE — C1751 CATH, INF, PER/CENT/MIDLINE: HCPCS

## 2023-04-24 PROCEDURE — 99024 POSTOP FOLLOW-UP VISIT: CPT | Performed by: SURGERY

## 2023-04-24 PROCEDURE — 99233 SBSQ HOSP IP/OBS HIGH 50: CPT | Performed by: INTERNAL MEDICINE

## 2023-04-24 PROCEDURE — 94760 N-INVAS EAR/PLS OXIMETRY 1: CPT

## 2023-04-24 PROCEDURE — 85025 COMPLETE CBC W/AUTO DIFF WBC: CPT

## 2023-04-24 PROCEDURE — 74176 CT ABD & PELVIS W/O CONTRAST: CPT

## 2023-04-24 PROCEDURE — 6370000000 HC RX 637 (ALT 250 FOR IP): Performed by: HOSPITALIST

## 2023-04-24 PROCEDURE — 6360000002 HC RX W HCPCS: Performed by: STUDENT IN AN ORGANIZED HEALTH CARE EDUCATION/TRAINING PROGRAM

## 2023-04-24 PROCEDURE — 6360000002 HC RX W HCPCS: Performed by: SURGERY

## 2023-04-24 PROCEDURE — 2500000003 HC RX 250 WO HCPCS: Performed by: INTERNAL MEDICINE

## 2023-04-24 PROCEDURE — 84100 ASSAY OF PHOSPHORUS: CPT

## 2023-04-24 PROCEDURE — 80053 COMPREHEN METABOLIC PANEL: CPT

## 2023-04-24 PROCEDURE — 76937 US GUIDE VASCULAR ACCESS: CPT

## 2023-04-24 PROCEDURE — 1200000000 HC SEMI PRIVATE

## 2023-04-24 PROCEDURE — 36569 INSJ PICC 5 YR+ W/O IMAGING: CPT

## 2023-04-24 PROCEDURE — 2580000003 HC RX 258: Performed by: INTERNAL MEDICINE

## 2023-04-24 PROCEDURE — 83735 ASSAY OF MAGNESIUM: CPT

## 2023-04-24 RX ORDER — SODIUM CHLORIDE 0.9 % (FLUSH) 0.9 %
5-40 SYRINGE (ML) INJECTION EVERY 12 HOURS SCHEDULED
Status: DISCONTINUED | OUTPATIENT
Start: 2023-04-24 | End: 2023-04-27 | Stop reason: HOSPADM

## 2023-04-24 RX ORDER — TRAMADOL HYDROCHLORIDE 50 MG/1
50 TABLET ORAL EVERY 6 HOURS PRN
Status: DISCONTINUED | OUTPATIENT
Start: 2023-04-24 | End: 2023-04-27 | Stop reason: HOSPADM

## 2023-04-24 RX ORDER — SODIUM CHLORIDE 0.9 % (FLUSH) 0.9 %
5-40 SYRINGE (ML) INJECTION PRN
Status: DISCONTINUED | OUTPATIENT
Start: 2023-04-24 | End: 2023-04-27 | Stop reason: HOSPADM

## 2023-04-24 RX ORDER — SODIUM CHLORIDE 9 MG/ML
25 INJECTION, SOLUTION INTRAVENOUS PRN
Status: DISCONTINUED | OUTPATIENT
Start: 2023-04-24 | End: 2023-04-27 | Stop reason: HOSPADM

## 2023-04-24 RX ORDER — LIDOCAINE HYDROCHLORIDE 10 MG/ML
5 INJECTION, SOLUTION EPIDURAL; INFILTRATION; INTRACAUDAL; PERINEURAL ONCE
Status: COMPLETED | OUTPATIENT
Start: 2023-04-24 | End: 2023-04-24

## 2023-04-24 RX ADMIN — PANTOPRAZOLE SODIUM 40 MG: 40 TABLET, DELAYED RELEASE ORAL at 07:53

## 2023-04-24 RX ADMIN — MORPHINE SULFATE 4 MG: 4 INJECTION, SOLUTION INTRAMUSCULAR; INTRAVENOUS at 17:01

## 2023-04-24 RX ADMIN — LAMOTRIGINE 25 MG: 25 TABLET ORAL at 10:09

## 2023-04-24 RX ADMIN — FOLIC ACID 1 MG: 1 TABLET ORAL at 10:09

## 2023-04-24 RX ADMIN — TRAMADOL HYDROCHLORIDE 50 MG: 50 TABLET, COATED ORAL at 23:40

## 2023-04-24 RX ADMIN — LAMOTRIGINE 25 MG: 25 TABLET ORAL at 21:36

## 2023-04-24 RX ADMIN — MORPHINE SULFATE 4 MG: 4 INJECTION, SOLUTION INTRAMUSCULAR; INTRAVENOUS at 19:41

## 2023-04-24 RX ADMIN — Medication 10 ML: at 19:41

## 2023-04-24 RX ADMIN — MORPHINE SULFATE 4 MG: 4 INJECTION, SOLUTION INTRAMUSCULAR; INTRAVENOUS at 02:34

## 2023-04-24 RX ADMIN — LIDOCAINE HYDROCHLORIDE 5 ML: 10 INJECTION, SOLUTION EPIDURAL; INFILTRATION; INTRACAUDAL; PERINEURAL at 13:16

## 2023-04-24 RX ADMIN — MORPHINE SULFATE 4 MG: 4 INJECTION, SOLUTION INTRAMUSCULAR; INTRAVENOUS at 05:01

## 2023-04-24 RX ADMIN — PIPERACILLIN AND TAZOBACTAM 4500 MG: 4; .5 INJECTION, POWDER, LYOPHILIZED, FOR SOLUTION INTRAVENOUS at 23:47

## 2023-04-24 RX ADMIN — Medication 10 ML: at 13:26

## 2023-04-24 RX ADMIN — PIPERACILLIN AND TAZOBACTAM 4500 MG: 4; .5 INJECTION, POWDER, LYOPHILIZED, FOR SOLUTION INTRAVENOUS at 16:55

## 2023-04-24 RX ADMIN — CARIPRAZINE 1.5 MG: 1.5 CAPSULE, GELATIN COATED ORAL at 21:36

## 2023-04-24 RX ADMIN — TRAMADOL HYDROCHLORIDE 50 MG: 50 TABLET, COATED ORAL at 13:42

## 2023-04-24 RX ADMIN — ENOXAPARIN SODIUM 30 MG: 100 INJECTION SUBCUTANEOUS at 10:13

## 2023-04-24 RX ADMIN — PIPERACILLIN AND TAZOBACTAM 4500 MG: 4; .5 INJECTION, POWDER, LYOPHILIZED, FOR SOLUTION INTRAVENOUS at 10:10

## 2023-04-24 RX ADMIN — SODIUM CHLORIDE, PRESERVATIVE FREE 10 ML: 5 INJECTION INTRAVENOUS at 10:12

## 2023-04-24 RX ADMIN — ENOXAPARIN SODIUM 30 MG: 100 INJECTION SUBCUTANEOUS at 21:36

## 2023-04-24 RX ADMIN — MORPHINE SULFATE 4 MG: 4 INJECTION, SOLUTION INTRAMUSCULAR; INTRAVENOUS at 10:21

## 2023-04-24 RX ADMIN — MORPHINE SULFATE 4 MG: 4 INJECTION, SOLUTION INTRAMUSCULAR; INTRAVENOUS at 07:54

## 2023-04-24 RX ADMIN — LEVOTHYROXINE SODIUM 50 MCG: 0.05 TABLET ORAL at 07:53

## 2023-04-24 ASSESSMENT — PAIN SCALES - GENERAL
PAINLEVEL_OUTOF10: 7
PAINLEVEL_OUTOF10: 7
PAINLEVEL_OUTOF10: 6
PAINLEVEL_OUTOF10: 8
PAINLEVEL_OUTOF10: 7
PAINLEVEL_OUTOF10: 7
PAINLEVEL_OUTOF10: 6
PAINLEVEL_OUTOF10: 4
PAINLEVEL_OUTOF10: 7
PAINLEVEL_OUTOF10: 6

## 2023-04-24 ASSESSMENT — PAIN DESCRIPTION - ONSET
ONSET: ON-GOING

## 2023-04-24 ASSESSMENT — PAIN DESCRIPTION - ORIENTATION
ORIENTATION: RIGHT
ORIENTATION: LOWER;RIGHT
ORIENTATION: RIGHT;UPPER
ORIENTATION: RIGHT;UPPER

## 2023-04-24 ASSESSMENT — PAIN DESCRIPTION - PAIN TYPE
TYPE: ACUTE PAIN
TYPE: SURGICAL PAIN
TYPE: ACUTE PAIN

## 2023-04-24 ASSESSMENT — PAIN DESCRIPTION - DESCRIPTORS
DESCRIPTORS: PINS AND NEEDLES
DESCRIPTORS: ACHING
DESCRIPTORS: ACHING

## 2023-04-24 ASSESSMENT — PAIN DESCRIPTION - FREQUENCY
FREQUENCY: CONTINUOUS

## 2023-04-24 ASSESSMENT — PAIN DESCRIPTION - LOCATION
LOCATION: ABDOMEN

## 2023-04-24 ASSESSMENT — PAIN - FUNCTIONAL ASSESSMENT
PAIN_FUNCTIONAL_ASSESSMENT: ACTIVITIES ARE NOT PREVENTED
PAIN_FUNCTIONAL_ASSESSMENT: PREVENTS OR INTERFERES SOME ACTIVE ACTIVITIES AND ADLS
PAIN_FUNCTIONAL_ASSESSMENT: ACTIVITIES ARE NOT PREVENTED

## 2023-04-24 ASSESSMENT — ENCOUNTER SYMPTOMS
VOMITING: 0
NAUSEA: 0

## 2023-04-24 NOTE — CARE COORDINATION
[facility]    Referral received from  to follow for home care services. [facility] unable to accept payor at this time. Referral declined by [facility], [facility], [facility], [facility], [facility], [facility], [facility], [facility], [facility].         Jarret Nathan RN, BSN CTN  [facility] [phone]

## 2023-04-25 PROBLEM — Z45.2 PERIPHERALLY INSERTED CENTRAL CATHETER (PICC) IN PLACE: Status: ACTIVE | Noted: 2023-04-25

## 2023-04-25 PROBLEM — R70.0 ELEVATED ERYTHROCYTE SEDIMENTATION RATE: Status: ACTIVE | Noted: 2023-04-25

## 2023-04-25 PROBLEM — R79.82 CRP ELEVATED: Status: ACTIVE | Noted: 2023-04-25

## 2023-04-25 LAB
ALBUMIN SERPL-MCNC: 2.2 G/DL (ref 3.4–5)
ALBUMIN/GLOB SERPL: 0.5 {RATIO} (ref 1.1–2.2)
ALP SERPL-CCNC: 139 U/L (ref 40–129)
ALT SERPL-CCNC: 12 U/L (ref 10–40)
ANION GAP SERPL CALCULATED.3IONS-SCNC: 8 MMOL/L (ref 3–16)
AST SERPL-CCNC: 15 U/L (ref 15–37)
BACTERIA BLD CULT ORG #2: NORMAL
BACTERIA BLD CULT: NORMAL
BASOPHILS # BLD: 0.1 K/UL (ref 0–0.2)
BASOPHILS NFR BLD: 0.7 %
BILIRUB SERPL-MCNC: 0.4 MG/DL (ref 0–1)
BUN SERPL-MCNC: 4 MG/DL (ref 7–20)
CALCIUM SERPL-MCNC: 7.7 MG/DL (ref 8.3–10.6)
CHLORIDE SERPL-SCNC: 97 MMOL/L (ref 99–110)
CO2 SERPL-SCNC: 30 MMOL/L (ref 21–32)
CREAT SERPL-MCNC: 0.6 MG/DL (ref 0.6–1.1)
DEPRECATED RDW RBC AUTO: 16.2 % (ref 12.4–15.4)
EOSINOPHIL # BLD: 0.2 K/UL (ref 0–0.6)
EOSINOPHIL NFR BLD: 1.4 %
GFR SERPLBLD CREATININE-BSD FMLA CKD-EPI: >60 ML/MIN/{1.73_M2}
GLUCOSE SERPL-MCNC: 125 MG/DL (ref 70–99)
HCT VFR BLD AUTO: 22.4 % (ref 36–48)
HGB BLD-MCNC: 7.3 G/DL (ref 12–16)
LYMPHOCYTES # BLD: 1.9 K/UL (ref 1–5.1)
LYMPHOCYTES NFR BLD: 16.1 %
MAGNESIUM SERPL-MCNC: 2.1 MG/DL (ref 1.8–2.4)
MCH RBC QN AUTO: 29.2 PG (ref 26–34)
MCHC RBC AUTO-ENTMCNC: 32.7 G/DL (ref 31–36)
MCV RBC AUTO: 89.4 FL (ref 80–100)
MONOCYTES # BLD: 0.6 K/UL (ref 0–1.3)
MONOCYTES NFR BLD: 5 %
NEUTROPHILS # BLD: 9.2 K/UL (ref 1.7–7.7)
NEUTROPHILS NFR BLD: 76.8 %
PHOSPHATE SERPL-MCNC: 3.1 MG/DL (ref 2.5–4.9)
PLATELET # BLD AUTO: 918 K/UL (ref 135–450)
PMV BLD AUTO: 6.6 FL (ref 5–10.5)
POTASSIUM SERPL-SCNC: 3.7 MMOL/L (ref 3.5–5.1)
PROT SERPL-MCNC: 6.3 G/DL (ref 6.4–8.2)
RBC # BLD AUTO: 2.5 M/UL (ref 4–5.2)
SODIUM SERPL-SCNC: 135 MMOL/L (ref 136–145)
WBC # BLD AUTO: 12 K/UL (ref 4–11)

## 2023-04-25 PROCEDURE — 2580000003 HC RX 258: Performed by: INTERNAL MEDICINE

## 2023-04-25 PROCEDURE — 6370000000 HC RX 637 (ALT 250 FOR IP): Performed by: STUDENT IN AN ORGANIZED HEALTH CARE EDUCATION/TRAINING PROGRAM

## 2023-04-25 PROCEDURE — 80053 COMPREHEN METABOLIC PANEL: CPT

## 2023-04-25 PROCEDURE — 1200000000 HC SEMI PRIVATE

## 2023-04-25 PROCEDURE — 6360000002 HC RX W HCPCS: Performed by: INTERNAL MEDICINE

## 2023-04-25 PROCEDURE — 6360000002 HC RX W HCPCS: Performed by: SURGERY

## 2023-04-25 PROCEDURE — 6360000002 HC RX W HCPCS: Performed by: STUDENT IN AN ORGANIZED HEALTH CARE EDUCATION/TRAINING PROGRAM

## 2023-04-25 PROCEDURE — 84100 ASSAY OF PHOSPHORUS: CPT

## 2023-04-25 PROCEDURE — 83735 ASSAY OF MAGNESIUM: CPT

## 2023-04-25 PROCEDURE — 6360000002 HC RX W HCPCS: Performed by: HOSPITALIST

## 2023-04-25 PROCEDURE — 85025 COMPLETE CBC W/AUTO DIFF WBC: CPT

## 2023-04-25 PROCEDURE — 99233 SBSQ HOSP IP/OBS HIGH 50: CPT | Performed by: INTERNAL MEDICINE

## 2023-04-25 PROCEDURE — 2580000003 HC RX 258: Performed by: STUDENT IN AN ORGANIZED HEALTH CARE EDUCATION/TRAINING PROGRAM

## 2023-04-25 PROCEDURE — 94760 N-INVAS EAR/PLS OXIMETRY 1: CPT

## 2023-04-25 PROCEDURE — 99024 POSTOP FOLLOW-UP VISIT: CPT | Performed by: SURGERY

## 2023-04-25 RX ADMIN — MORPHINE SULFATE 4 MG: 4 INJECTION, SOLUTION INTRAMUSCULAR; INTRAVENOUS at 08:18

## 2023-04-25 RX ADMIN — LAMOTRIGINE 25 MG: 25 TABLET ORAL at 21:24

## 2023-04-25 RX ADMIN — PIPERACILLIN AND TAZOBACTAM 4500 MG: 4; .5 INJECTION, POWDER, LYOPHILIZED, FOR SOLUTION INTRAVENOUS at 23:56

## 2023-04-25 RX ADMIN — SODIUM CHLORIDE, PRESERVATIVE FREE 10 ML: 5 INJECTION INTRAVENOUS at 08:24

## 2023-04-25 RX ADMIN — PIPERACILLIN AND TAZOBACTAM 4500 MG: 4; .5 INJECTION, POWDER, LYOPHILIZED, FOR SOLUTION INTRAVENOUS at 16:59

## 2023-04-25 RX ADMIN — HYDROMORPHONE HYDROCHLORIDE 0.5 MG: 1 INJECTION, SOLUTION INTRAMUSCULAR; INTRAVENOUS; SUBCUTANEOUS at 20:00

## 2023-04-25 RX ADMIN — ENOXAPARIN SODIUM 30 MG: 100 INJECTION SUBCUTANEOUS at 21:24

## 2023-04-25 RX ADMIN — MORPHINE SULFATE 4 MG: 4 INJECTION, SOLUTION INTRAMUSCULAR; INTRAVENOUS at 12:59

## 2023-04-25 RX ADMIN — LEVOTHYROXINE SODIUM 50 MCG: 0.05 TABLET ORAL at 05:04

## 2023-04-25 RX ADMIN — PIPERACILLIN AND TAZOBACTAM 4500 MG: 4; .5 INJECTION, POWDER, LYOPHILIZED, FOR SOLUTION INTRAVENOUS at 08:22

## 2023-04-25 RX ADMIN — CARIPRAZINE 1.5 MG: 1.5 CAPSULE, GELATIN COATED ORAL at 21:24

## 2023-04-25 RX ADMIN — PANTOPRAZOLE SODIUM 40 MG: 40 TABLET, DELAYED RELEASE ORAL at 05:04

## 2023-04-25 RX ADMIN — HYDROMORPHONE HYDROCHLORIDE 0.5 MG: 1 INJECTION, SOLUTION INTRAMUSCULAR; INTRAVENOUS; SUBCUTANEOUS at 23:59

## 2023-04-25 RX ADMIN — FOLIC ACID 1 MG: 1 TABLET ORAL at 09:14

## 2023-04-25 RX ADMIN — Medication 10 ML: at 20:01

## 2023-04-25 RX ADMIN — LAMOTRIGINE 25 MG: 25 TABLET ORAL at 09:14

## 2023-04-25 RX ADMIN — ENOXAPARIN SODIUM 30 MG: 100 INJECTION SUBCUTANEOUS at 09:15

## 2023-04-25 RX ADMIN — HYDROXYZINE HYDROCHLORIDE 50 MG: 25 TABLET, FILM COATED ORAL at 13:33

## 2023-04-25 RX ADMIN — MORPHINE SULFATE 4 MG: 4 INJECTION, SOLUTION INTRAMUSCULAR; INTRAVENOUS at 02:50

## 2023-04-25 RX ADMIN — MORPHINE SULFATE 4 MG: 4 INJECTION, SOLUTION INTRAMUSCULAR; INTRAVENOUS at 05:27

## 2023-04-25 RX ADMIN — Medication 10 ML: at 08:23

## 2023-04-25 ASSESSMENT — PAIN - FUNCTIONAL ASSESSMENT
PAIN_FUNCTIONAL_ASSESSMENT: ACTIVITIES ARE NOT PREVENTED

## 2023-04-25 ASSESSMENT — PAIN SCALES - GENERAL
PAINLEVEL_OUTOF10: 7
PAINLEVEL_OUTOF10: 5
PAINLEVEL_OUTOF10: 7
PAINLEVEL_OUTOF10: 5
PAINLEVEL_OUTOF10: 0

## 2023-04-25 ASSESSMENT — PAIN DESCRIPTION - ONSET
ONSET: ON-GOING

## 2023-04-25 ASSESSMENT — PAIN DESCRIPTION - ORIENTATION
ORIENTATION: RIGHT
ORIENTATION: RIGHT;UPPER
ORIENTATION: RIGHT
ORIENTATION: RIGHT
ORIENTATION: RIGHT;UPPER
ORIENTATION: RIGHT;UPPER

## 2023-04-25 ASSESSMENT — PAIN DESCRIPTION - LOCATION
LOCATION: ABDOMEN;FLANK
LOCATION: ABDOMEN

## 2023-04-25 ASSESSMENT — PAIN DESCRIPTION - PAIN TYPE
TYPE: ACUTE PAIN
TYPE: ACUTE PAIN

## 2023-04-25 ASSESSMENT — PAIN DESCRIPTION - DESCRIPTORS
DESCRIPTORS: SHARP
DESCRIPTORS: ACHING;DISCOMFORT
DESCRIPTORS: SHARP
DESCRIPTORS: SHARP;DULL;THROBBING
DESCRIPTORS: ACHING
DESCRIPTORS: THROBBING;SHARP

## 2023-04-25 ASSESSMENT — ENCOUNTER SYMPTOMS
NAUSEA: 0
VOMITING: 0

## 2023-04-25 ASSESSMENT — PAIN SCALES - WONG BAKER
WONGBAKER_NUMERICALRESPONSE: 0

## 2023-04-25 ASSESSMENT — PAIN DESCRIPTION - FREQUENCY: FREQUENCY: CONTINUOUS

## 2023-04-25 NOTE — DISCHARGE INSTR - COC
Continuity of Care Form    Patient Name: Cristina Pickens   :  1985  MRN:  [id number]    Admit date:  2023  Discharge date:  2023    Code Status Order: Full Code   Advance Directives:     Admitting Physician:  James Coto DO  PCP: Mehdi Lema MD    Discharging Nurse: [facility] Unit/Room#: H4J-5828/1328-83  Discharging Unit Phone Number: [phone]    Emergency Contact:   Extended Emergency Contact Information  Primary Emergency Contact: Lianne Medrano  [facility] [address] [facility] of [address] [place] [address] Phone: [phone]  Relation: Spouse    Past Surgical History:  Past Surgical History:   Procedure Laterality Date    CARPAL TUNNEL RELEASE Right 2014    CARPAL TUNNEL RELEASE Left 2016     SECTION   and     CHOLECYSTECTOMY, LAPAROSCOPIC N/A 2023    LAPAROSCOPIC CHOLECYSTECTOMY WITH CHOLANGIOGRAM performed by Kuldip Urban MD at [place] [address] Right 2023    10F. Dr. Brenda Sanabria, COLON, DIAGNOSTIC      ERCP N/A 2023    ERCP SPHINCTEROTOMY performed by Adriano Ruiz MD at [facility]    ERCP N/A 2023    ERCP BALLOON SWEEP W/ STONE REMOVAL performed by Adriano Ruiz MD at [address] [facility]    ERCP N/A 2023    ERCP STENT INSERTION performed by Adriano Ruiz MD at [address] [place] [address] Left 2016    WISDOM TOOTH EXTRACTION  2002       Immunization History:   Immunization History   Administered Date(s) Administered    COVID-19, PFIZER PURPLE top, DILUTE for use, (age 15 y+), 30mcg/0.3mL 2021, 2021, 2021    Influenza, Intradermal, Preservative free 10/21/2014, 10/12/2015       Active Problems:  Patient Active Problem List   Diagnosis Code    Depression F32. A    Carpal tunnel syndrome G56.00    Depression with anxiety F41.8    Cubital tunnel syndrome G56.20    ADHD,

## 2023-04-25 NOTE — CARE COORDINATION
Discharge Planning:    Per chart review, patient may be discharge ready tomorrow, assuming she remains stable. Case management unable to secure home care due to patient's insurance program. Call placed to Kristen Ventura, to update on patient status/timeline for discharge; left message requesting return call. Case management to work with Kristen on setting up [facility] prior to discharge so patient can receive IVAB as outpatient. Electronically signed by Ambrosio Arguelles RN on 4/25/2023 at 3:23 PM    Received return call from Samy Doe stating that patient will need 1) PICC line access, 2) final ID recs, 3) set up with an infusion center for weekly labs and dressing changes. Awaiting on final ID recs at this time. PICC line in place. Will continue to follow for updates.     Electronically signed by Ambrosio Arguelles RN on 4/25/2023 at 6:32 PM

## 2023-04-26 LAB
ALBUMIN SERPL-MCNC: 2.3 G/DL (ref 3.4–5)
ALBUMIN/GLOB SERPL: 0.6 {RATIO} (ref 1.1–2.2)
ALP SERPL-CCNC: 145 U/L (ref 40–129)
ALT SERPL-CCNC: 11 U/L (ref 10–40)
ANION GAP SERPL CALCULATED.3IONS-SCNC: 9 MMOL/L (ref 3–16)
AST SERPL-CCNC: 14 U/L (ref 15–37)
BACTERIA SPEC AEROBE CULT: ABNORMAL
BACTERIA SPEC ANAEROBE CULT: ABNORMAL
BASOPHILS # BLD: 0.1 K/UL (ref 0–0.2)
BASOPHILS NFR BLD: 0.5 %
BILIRUB SERPL-MCNC: 0.3 MG/DL (ref 0–1)
BUN SERPL-MCNC: 5 MG/DL (ref 7–20)
CALCIUM SERPL-MCNC: 7.8 MG/DL (ref 8.3–10.6)
CHLORIDE SERPL-SCNC: 97 MMOL/L (ref 99–110)
CO2 SERPL-SCNC: 30 MMOL/L (ref 21–32)
CREAT SERPL-MCNC: 0.6 MG/DL (ref 0.6–1.1)
DEPRECATED RDW RBC AUTO: 16.2 % (ref 12.4–15.4)
EOSINOPHIL # BLD: 0.1 K/UL (ref 0–0.6)
EOSINOPHIL NFR BLD: 1.3 %
GFR SERPLBLD CREATININE-BSD FMLA CKD-EPI: >60 ML/MIN/{1.73_M2}
GLUCOSE SERPL-MCNC: 129 MG/DL (ref 70–99)
GRAM STN SPEC: ABNORMAL
HCT VFR BLD AUTO: 23.6 % (ref 36–48)
HGB BLD-MCNC: 7.6 G/DL (ref 12–16)
LYMPHOCYTES # BLD: 2.1 K/UL (ref 1–5.1)
LYMPHOCYTES NFR BLD: 18.7 %
MAGNESIUM SERPL-MCNC: 2.1 MG/DL (ref 1.8–2.4)
MCH RBC QN AUTO: 28.6 PG (ref 26–34)
MCHC RBC AUTO-ENTMCNC: 32 G/DL (ref 31–36)
MCV RBC AUTO: 89.3 FL (ref 80–100)
MONOCYTES # BLD: 0.7 K/UL (ref 0–1.3)
MONOCYTES NFR BLD: 6.4 %
NEUTROPHILS # BLD: 8.2 K/UL (ref 1.7–7.7)
NEUTROPHILS NFR BLD: 73.1 %
ORGANISM: ABNORMAL
ORGANISM: ABNORMAL
PHOSPHATE SERPL-MCNC: 3.7 MG/DL (ref 2.5–4.9)
PLATELET # BLD AUTO: 973 K/UL (ref 135–450)
PMV BLD AUTO: 6.5 FL (ref 5–10.5)
POTASSIUM SERPL-SCNC: 4.5 MMOL/L (ref 3.5–5.1)
PROT SERPL-MCNC: 5.9 G/DL (ref 6.4–8.2)
RBC # BLD AUTO: 2.64 M/UL (ref 4–5.2)
SODIUM SERPL-SCNC: 136 MMOL/L (ref 136–145)
WBC # BLD AUTO: 11.3 K/UL (ref 4–11)

## 2023-04-26 PROCEDURE — 6370000000 HC RX 637 (ALT 250 FOR IP): Performed by: STUDENT IN AN ORGANIZED HEALTH CARE EDUCATION/TRAINING PROGRAM

## 2023-04-26 PROCEDURE — 84100 ASSAY OF PHOSPHORUS: CPT

## 2023-04-26 PROCEDURE — APPNB15 APP NON BILLABLE TIME 0-15 MINS: Performed by: PHYSICIAN ASSISTANT

## 2023-04-26 PROCEDURE — 6360000002 HC RX W HCPCS: Performed by: HOSPITALIST

## 2023-04-26 PROCEDURE — 85025 COMPLETE CBC W/AUTO DIFF WBC: CPT

## 2023-04-26 PROCEDURE — 6370000000 HC RX 637 (ALT 250 FOR IP): Performed by: HOSPITALIST

## 2023-04-26 PROCEDURE — 2580000003 HC RX 258: Performed by: INTERNAL MEDICINE

## 2023-04-26 PROCEDURE — 6360000002 HC RX W HCPCS: Performed by: INTERNAL MEDICINE

## 2023-04-26 PROCEDURE — 80053 COMPREHEN METABOLIC PANEL: CPT

## 2023-04-26 PROCEDURE — 99232 SBSQ HOSP IP/OBS MODERATE 35: CPT | Performed by: INTERNAL MEDICINE

## 2023-04-26 PROCEDURE — 83735 ASSAY OF MAGNESIUM: CPT

## 2023-04-26 PROCEDURE — 99024 POSTOP FOLLOW-UP VISIT: CPT | Performed by: PHYSICIAN ASSISTANT

## 2023-04-26 PROCEDURE — APPSS15 APP SPLIT SHARED TIME 0-15 MINUTES: Performed by: PHYSICIAN ASSISTANT

## 2023-04-26 PROCEDURE — 6360000002 HC RX W HCPCS: Performed by: STUDENT IN AN ORGANIZED HEALTH CARE EDUCATION/TRAINING PROGRAM

## 2023-04-26 PROCEDURE — 1200000000 HC SEMI PRIVATE

## 2023-04-26 RX ORDER — POLYETHYLENE GLYCOL 3350 17 G/17G
17 POWDER, FOR SOLUTION ORAL DAILY
Status: DISCONTINUED | OUTPATIENT
Start: 2023-04-26 | End: 2023-04-27 | Stop reason: HOSPADM

## 2023-04-26 RX ORDER — OXYCODONE HCL 10 MG/1
20 TABLET, FILM COATED, EXTENDED RELEASE ORAL EVERY 12 HOURS SCHEDULED
Status: DISCONTINUED | OUTPATIENT
Start: 2023-04-26 | End: 2023-04-27 | Stop reason: HOSPADM

## 2023-04-26 RX ADMIN — HYDROMORPHONE HYDROCHLORIDE 0.25 MG: 1 INJECTION, SOLUTION INTRAMUSCULAR; INTRAVENOUS; SUBCUTANEOUS at 14:51

## 2023-04-26 RX ADMIN — HYDROXYZINE HYDROCHLORIDE 50 MG: 25 TABLET, FILM COATED ORAL at 09:03

## 2023-04-26 RX ADMIN — LEVOTHYROXINE SODIUM 50 MCG: 0.05 TABLET ORAL at 06:13

## 2023-04-26 RX ADMIN — ENOXAPARIN SODIUM 30 MG: 100 INJECTION SUBCUTANEOUS at 20:43

## 2023-04-26 RX ADMIN — Medication 10 ML: at 20:43

## 2023-04-26 RX ADMIN — TRAMADOL HYDROCHLORIDE 50 MG: 50 TABLET, COATED ORAL at 01:59

## 2023-04-26 RX ADMIN — ENOXAPARIN SODIUM 30 MG: 100 INJECTION SUBCUTANEOUS at 08:51

## 2023-04-26 RX ADMIN — CARIPRAZINE 1.5 MG: 1.5 CAPSULE, GELATIN COATED ORAL at 20:53

## 2023-04-26 RX ADMIN — LAMOTRIGINE 25 MG: 25 TABLET ORAL at 20:44

## 2023-04-26 RX ADMIN — PIPERACILLIN AND TAZOBACTAM 4500 MG: 4; .5 INJECTION, POWDER, LYOPHILIZED, FOR SOLUTION INTRAVENOUS at 08:22

## 2023-04-26 RX ADMIN — PANTOPRAZOLE SODIUM 40 MG: 40 TABLET, DELAYED RELEASE ORAL at 06:13

## 2023-04-26 RX ADMIN — HYDROMORPHONE HYDROCHLORIDE 0.5 MG: 1 INJECTION, SOLUTION INTRAMUSCULAR; INTRAVENOUS; SUBCUTANEOUS at 09:05

## 2023-04-26 RX ADMIN — PIPERACILLIN AND TAZOBACTAM 4500 MG: 4; .5 INJECTION, POWDER, LYOPHILIZED, FOR SOLUTION INTRAVENOUS at 16:16

## 2023-04-26 RX ADMIN — FOLIC ACID 1 MG: 1 TABLET ORAL at 08:55

## 2023-04-26 RX ADMIN — TRAMADOL HYDROCHLORIDE 50 MG: 50 TABLET, COATED ORAL at 12:59

## 2023-04-26 RX ADMIN — HYDROMORPHONE HYDROCHLORIDE 0.5 MG: 1 INJECTION, SOLUTION INTRAMUSCULAR; INTRAVENOUS; SUBCUTANEOUS at 03:58

## 2023-04-26 RX ADMIN — OXYCODONE HYDROCHLORIDE 20 MG: 10 TABLET, FILM COATED, EXTENDED RELEASE ORAL at 20:42

## 2023-04-26 RX ADMIN — HYDROXYZINE HYDROCHLORIDE 50 MG: 25 TABLET, FILM COATED ORAL at 20:43

## 2023-04-26 RX ADMIN — LAMOTRIGINE 25 MG: 25 TABLET ORAL at 08:55

## 2023-04-26 RX ADMIN — POLYETHYLENE GLYCOL 3350 17 G: 17 POWDER, FOR SOLUTION ORAL at 12:56

## 2023-04-26 ASSESSMENT — PAIN SCALES - WONG BAKER
WONGBAKER_NUMERICALRESPONSE: 0

## 2023-04-26 ASSESSMENT — PAIN SCALES - GENERAL
PAINLEVEL_OUTOF10: 7
PAINLEVEL_OUTOF10: 2
PAINLEVEL_OUTOF10: 2
PAINLEVEL_OUTOF10: 7
PAINLEVEL_OUTOF10: 7
PAINLEVEL_OUTOF10: 0
PAINLEVEL_OUTOF10: 7

## 2023-04-26 ASSESSMENT — PAIN DESCRIPTION - LOCATION
LOCATION: ABDOMEN
LOCATION: ABDOMEN
LOCATION: ABDOMEN;FLANK
LOCATION: ABDOMEN;FLANK
LOCATION: ABDOMEN

## 2023-04-26 ASSESSMENT — PAIN DESCRIPTION - DESCRIPTORS
DESCRIPTORS: SHARP
DESCRIPTORS: STABBING;THROBBING
DESCRIPTORS: CRAMPING;SHARP
DESCRIPTORS: SHARP;STABBING;THROBBING
DESCRIPTORS: STABBING;THROBBING

## 2023-04-26 ASSESSMENT — PAIN DESCRIPTION - ORIENTATION
ORIENTATION: RIGHT;LOWER;MID
ORIENTATION: RIGHT;UPPER;LOWER
ORIENTATION: RIGHT;MID;LOWER
ORIENTATION: RIGHT

## 2023-04-26 NOTE — PLAN OF CARE
Problem: Discharge Planning  Goal: Discharge to home or other facility with appropriate resources  4/26/2023 1119 by Lidia Peck RN  Outcome: Progressing     Problem: Pain  Goal: Verbalizes/displays adequate comfort level or baseline comfort level  4/26/2023 1119 by Lidia Peck RN  Outcome: Progressing     Problem: Safety - Adult  Goal: Free from fall injury  4/26/2023 1119 by Lidia Peck RN  Outcome: Progressing     Problem: ABCDS Injury Assessment  Goal: Absence of physical injury  4/26/2023 1119 by Lidia Peck RN  Outcome: Progressing

## 2023-04-26 NOTE — PLAN OF CARE
Problem: Discharge Planning  Goal: Discharge to home or other facility with appropriate resources  4/26/2023 0059 by Facundo Francois RN  Outcome: Progressing  4/25/2023 1540 by Roberto Carlos Grey RN  Outcome: Progressing     Problem: Pain  Goal: Verbalizes/displays adequate comfort level or baseline comfort level  4/26/2023 0059 by Facundo Francois RN  Outcome: Progressing  4/25/2023 1540 by Roberto Carlos Grey RN  Outcome: Progressing     Problem: Safety - Adult  Goal: Free from fall injury  4/26/2023 0059 by Facundo Francois RN  Outcome: Progressing  4/25/2023 1540 by Roberto Carlos Grey RN  Outcome: Progressing     Problem: ABCDS Injury Assessment  Goal: Absence of physical injury  4/26/2023 0059 by Facundo Francois RN  Outcome: Progressing  4/25/2023 1540 by Roberto Carlos Grey RN  Outcome: Progressing   Alert and oriented x4 Resp. Easy and even Sats. WNL on RA Lungs with some expir wheezes Cough and deep breathing exercises encouraged Medicated x2 for c/o abd/flank pain with good short term effect. Cont.  Per bathroom independently with steady gait Frequently turns and repositions self

## 2023-04-26 NOTE — CARE COORDINATION
Spoke to Samy aggarwal/ [facility], patient is eligible for VAD program for IVAB at home. CHRISTOPH has been completed by ID. Spoke to [place] [facility] at [address] to inform her of need for patient to come weekly for lab work and PICC dressing changes. Jay camargo was given patient information and verifies patient is able to come. Infusion center typically does labs/dressing changes on Monday or Tuesday. Will put follow up information on CHRISTOPH for patient.      Electronically signed by Frantz Alexander RN Case Management on 4/26/2023 at 12:28 PM

## 2023-04-27 VITALS
SYSTOLIC BLOOD PRESSURE: 139 MMHG | TEMPERATURE: 99.8 F | OXYGEN SATURATION: 96 % | HEIGHT: 62 IN | HEART RATE: 98 BPM | DIASTOLIC BLOOD PRESSURE: 65 MMHG | BODY MASS INDEX: 53.92 KG/M2 | WEIGHT: 293 LBS | RESPIRATION RATE: 18 BRPM

## 2023-04-27 LAB
ALBUMIN SERPL-MCNC: 2.2 G/DL (ref 3.4–5)
ALBUMIN/GLOB SERPL: 0.6 {RATIO} (ref 1.1–2.2)
ALP SERPL-CCNC: 140 U/L (ref 40–129)
ALT SERPL-CCNC: 10 U/L (ref 10–40)
ANION GAP SERPL CALCULATED.3IONS-SCNC: 9 MMOL/L (ref 3–16)
AST SERPL-CCNC: 15 U/L (ref 15–37)
BASOPHILS # BLD: 0.1 K/UL (ref 0–0.2)
BASOPHILS NFR BLD: 0.7 %
BILIRUB SERPL-MCNC: 0.3 MG/DL (ref 0–1)
BUN SERPL-MCNC: 4 MG/DL (ref 7–20)
CALCIUM SERPL-MCNC: 7.8 MG/DL (ref 8.3–10.6)
CHLORIDE SERPL-SCNC: 98 MMOL/L (ref 99–110)
CO2 SERPL-SCNC: 28 MMOL/L (ref 21–32)
CREAT SERPL-MCNC: 0.7 MG/DL (ref 0.6–1.1)
DEPRECATED RDW RBC AUTO: 16.3 % (ref 12.4–15.4)
EOSINOPHIL # BLD: 0.1 K/UL (ref 0–0.6)
EOSINOPHIL NFR BLD: 1.2 %
GFR SERPLBLD CREATININE-BSD FMLA CKD-EPI: >60 ML/MIN/{1.73_M2}
GLUCOSE SERPL-MCNC: 124 MG/DL (ref 70–99)
HCT VFR BLD AUTO: 23.2 % (ref 36–48)
HGB BLD-MCNC: 7.3 G/DL (ref 12–16)
LYMPHOCYTES # BLD: 1.9 K/UL (ref 1–5.1)
LYMPHOCYTES NFR BLD: 16.9 %
MAGNESIUM SERPL-MCNC: 1.9 MG/DL (ref 1.8–2.4)
MCH RBC QN AUTO: 28.3 PG (ref 26–34)
MCHC RBC AUTO-ENTMCNC: 31.5 G/DL (ref 31–36)
MCV RBC AUTO: 89.9 FL (ref 80–100)
MONOCYTES # BLD: 0.8 K/UL (ref 0–1.3)
MONOCYTES NFR BLD: 6.8 %
NEUTROPHILS # BLD: 8.5 K/UL (ref 1.7–7.7)
NEUTROPHILS NFR BLD: 74.4 %
PHOSPHATE SERPL-MCNC: 3.5 MG/DL (ref 2.5–4.9)
PLATELET # BLD AUTO: 906 K/UL (ref 135–450)
PMV BLD AUTO: 6.3 FL (ref 5–10.5)
POTASSIUM SERPL-SCNC: 4.6 MMOL/L (ref 3.5–5.1)
PROT SERPL-MCNC: 5.9 G/DL (ref 6.4–8.2)
RBC # BLD AUTO: 2.58 M/UL (ref 4–5.2)
SODIUM SERPL-SCNC: 135 MMOL/L (ref 136–145)
WBC # BLD AUTO: 11.5 K/UL (ref 4–11)

## 2023-04-27 PROCEDURE — 99024 POSTOP FOLLOW-UP VISIT: CPT | Performed by: PHYSICIAN ASSISTANT

## 2023-04-27 PROCEDURE — 99232 SBSQ HOSP IP/OBS MODERATE 35: CPT | Performed by: INTERNAL MEDICINE

## 2023-04-27 PROCEDURE — 84100 ASSAY OF PHOSPHORUS: CPT

## 2023-04-27 PROCEDURE — 6360000002 HC RX W HCPCS: Performed by: STUDENT IN AN ORGANIZED HEALTH CARE EDUCATION/TRAINING PROGRAM

## 2023-04-27 PROCEDURE — 6370000000 HC RX 637 (ALT 250 FOR IP): Performed by: STUDENT IN AN ORGANIZED HEALTH CARE EDUCATION/TRAINING PROGRAM

## 2023-04-27 PROCEDURE — 6370000000 HC RX 637 (ALT 250 FOR IP): Performed by: HOSPITALIST

## 2023-04-27 PROCEDURE — 6360000002 HC RX W HCPCS: Performed by: INTERNAL MEDICINE

## 2023-04-27 PROCEDURE — 83735 ASSAY OF MAGNESIUM: CPT

## 2023-04-27 PROCEDURE — 2580000003 HC RX 258: Performed by: INTERNAL MEDICINE

## 2023-04-27 PROCEDURE — 94760 N-INVAS EAR/PLS OXIMETRY 1: CPT

## 2023-04-27 PROCEDURE — 85025 COMPLETE CBC W/AUTO DIFF WBC: CPT

## 2023-04-27 PROCEDURE — 2580000003 HC RX 258: Performed by: STUDENT IN AN ORGANIZED HEALTH CARE EDUCATION/TRAINING PROGRAM

## 2023-04-27 PROCEDURE — APPSS15 APP SPLIT SHARED TIME 0-15 MINUTES: Performed by: PHYSICIAN ASSISTANT

## 2023-04-27 PROCEDURE — APPNB15 APP NON BILLABLE TIME 0-15 MINS: Performed by: PHYSICIAN ASSISTANT

## 2023-04-27 PROCEDURE — 80053 COMPREHEN METABOLIC PANEL: CPT

## 2023-04-27 RX ORDER — OXYCODONE HCL 20 MG/1
20 TABLET, FILM COATED, EXTENDED RELEASE ORAL EVERY 12 HOURS
Qty: 14 EACH | Refills: 0 | Status: SHIPPED | OUTPATIENT
Start: 2023-04-27 | End: 2023-05-04

## 2023-04-27 RX ORDER — TRAMADOL HYDROCHLORIDE 50 MG/1
50 TABLET ORAL EVERY 6 HOURS PRN
Qty: 28 TABLET | Refills: 0 | Status: SHIPPED | OUTPATIENT
Start: 2023-04-27 | End: 2023-05-04

## 2023-04-27 RX ADMIN — PIPERACILLIN AND TAZOBACTAM 4500 MG: 4; .5 INJECTION, POWDER, LYOPHILIZED, FOR SOLUTION INTRAVENOUS at 15:51

## 2023-04-27 RX ADMIN — OXYCODONE HYDROCHLORIDE 20 MG: 10 TABLET, FILM COATED, EXTENDED RELEASE ORAL at 10:04

## 2023-04-27 RX ADMIN — SODIUM CHLORIDE, PRESERVATIVE FREE 10 ML: 5 INJECTION INTRAVENOUS at 00:18

## 2023-04-27 RX ADMIN — PIPERACILLIN AND TAZOBACTAM 4500 MG: 4; .5 INJECTION, POWDER, LYOPHILIZED, FOR SOLUTION INTRAVENOUS at 00:13

## 2023-04-27 RX ADMIN — FOLIC ACID 1 MG: 1 TABLET ORAL at 10:04

## 2023-04-27 RX ADMIN — Medication 10 ML: at 07:57

## 2023-04-27 RX ADMIN — TRAMADOL HYDROCHLORIDE 50 MG: 50 TABLET, COATED ORAL at 06:18

## 2023-04-27 RX ADMIN — ENOXAPARIN SODIUM 30 MG: 100 INJECTION SUBCUTANEOUS at 10:05

## 2023-04-27 RX ADMIN — PIPERACILLIN AND TAZOBACTAM 4500 MG: 4; .5 INJECTION, POWDER, LYOPHILIZED, FOR SOLUTION INTRAVENOUS at 07:57

## 2023-04-27 RX ADMIN — LAMOTRIGINE 25 MG: 25 TABLET ORAL at 10:04

## 2023-04-27 RX ADMIN — PANTOPRAZOLE SODIUM 40 MG: 40 TABLET, DELAYED RELEASE ORAL at 06:19

## 2023-04-27 RX ADMIN — SODIUM CHLORIDE, PRESERVATIVE FREE 10 ML: 5 INJECTION INTRAVENOUS at 10:08

## 2023-04-27 RX ADMIN — LEVOTHYROXINE SODIUM 50 MCG: 0.05 TABLET ORAL at 06:18

## 2023-04-27 RX ADMIN — TRAMADOL HYDROCHLORIDE 50 MG: 50 TABLET, COATED ORAL at 00:09

## 2023-04-27 ASSESSMENT — PAIN DESCRIPTION - ORIENTATION
ORIENTATION: LOWER;RIGHT
ORIENTATION: RIGHT;UPPER;LOWER

## 2023-04-27 ASSESSMENT — PAIN SCALES - GENERAL
PAINLEVEL_OUTOF10: 3
PAINLEVEL_OUTOF10: 7
PAINLEVEL_OUTOF10: 7
PAINLEVEL_OUTOF10: 4

## 2023-04-27 ASSESSMENT — PAIN - FUNCTIONAL ASSESSMENT
PAIN_FUNCTIONAL_ASSESSMENT: ACTIVITIES ARE NOT PREVENTED
PAIN_FUNCTIONAL_ASSESSMENT: ACTIVITIES ARE NOT PREVENTED

## 2023-04-27 ASSESSMENT — PAIN DESCRIPTION - DESCRIPTORS
DESCRIPTORS: PINS AND NEEDLES;STABBING;THROBBING
DESCRIPTORS: PINS AND NEEDLES;ACHING
DESCRIPTORS: PINS AND NEEDLES

## 2023-04-27 ASSESSMENT — PAIN DESCRIPTION - LOCATION
LOCATION: ABDOMEN
LOCATION: ABDOMEN

## 2023-04-27 NOTE — CARE COORDINATION
CASE MANAGEMENT DISCHARGE SUMMARY:    DISCHARGE DATE: 4/27/2023    DISCHARGED TO: home     HOME CARE: Case Management unable to find home healthcare due to patient's insurance. Patient trained by [facility] staff on how to self-administer IVAB. Patient to follow-up at [facility] on 5/1/2023 for dressing changes and labs. TRANSPORTATION: via family             TIME: TBD by patient and bedside RN    COMMENTS: Patient, bedside RN, and [facility] rep Sue Donnelly) aware of discharge plan and timeline. This RN CM called in script to [facility] for IVAB delivery to patient's home later today.     Electronically signed by Loree Zimmer RN on 4/27/2023 at 3:29 PM

## 2023-04-27 NOTE — PROGRESS NOTES
AM Assessment completed. /80   Pulse (!) 105   Temp 99.3 °F (37.4 °C) (Oral)   Resp 20   Ht 5' 2\" (1.575 m)   Wt 296 lb 4.8 oz (134.4 kg)   LMP 04/18/2023 (Exact Date)   SpO2 96%   BMI 54.19 kg/m²     Alert and oriented. Sitting up in the chair. States that she feels a little better. Drains intact. Denies N/V/D. Gave ice pack for pain. Pain Assessment  Pain Level: 4  Response to Pain Intervention: Patient satisfied  Side Effects: No reported side effects   Pain/Discomfort is being managed with PRN analgesics per MD orders (See MAR). Patient is able to express and rate pain using numerical scale. Calls appropriately. Plan of care, education and safety measures reviewed and mutually agreed upon with the patient. Needed items including call light in reach and exit alarm in place.        Electronically signed by Deepti Rodriguez RN on 4/24/2023 at 9:32 AM
Discharge instructions went over with pt; all questions answered. Pt discharging home with wife. Telebox number [id number] removed and returned to [facility]. Pt left with belongings including: clothing, purse, cell phone, phone , blanket, and shoes.  Electronically signed by Kev Marie RN on 4/27/23 at 6:32 PM EDT
General and Vascular Surgery                                                           Daily Progress Note                                                             Alli Lagunas PA-C    Pt Name: Hernán Frances  Medical Record Number: [id number]  Date of Birth 1985   Today's Date: 4/26/2023    Chief Complaint: Abdominal pain    ASSESSMENT  Intraabdominal abscess  +erythema surrounding lateral OXANA drain. Erythema decreased from previous marking on skin- +purulent drainage from drain. More medial drain minimal purulent output. Denies any nausea or emesis  Last BM was Sunday, 4/23/23. +flatulence  Tolerating regular diet  Leukocytosis improving: WBC count 16.0 --> 12.0 -->11.3  OOB and ambulate  Home soon with drains and antibiotics    EDUCATION  Patient educated about their illness/diagnosis, stated above, and all questions answered. We discussed the importance of nutrition, medications they are taking, and healthy lifestyle. Tee Shelter has slightly improved from yesterday. Pain is well controlled. She has no nausea and no vomiting. She has passed flatus and has not had a bowel movement since Sunday 4/23/23. She is tolerating regular diet. Current activity is ad lul    OBJECTIVE  VITALS:  height is 5' 2\" (1.575 m) and weight is 295 lb 13.7 oz (134.2 kg). Her oral temperature is 99.1 °F (37.3 °C). Her blood pressure is 130/71 and her pulse is 95. Her respiration is 16 and oxygen saturation is 92%.  VITALS:  /71   Pulse 95   Temp 99.1 °F (37.3 °C) (Oral)   Resp 16   Ht 5' 2\" (1.575 m)   Wt 295 lb 13.7 oz (134.2 kg)   LMP 04/18/2023 (Exact Date)   SpO2 92%   BMI 54.11 kg/m²   GENERAL: alert, no distress  LUNGS: clear to ausculation, without wheezes, rales or rhonci  HEART: normal rate and regular rhythm  ABDOMEN: non-distended, tenderness present- around OXANA insertion sites,  without rebound and guarding, and bowel sounds
General and Vascular Surgery                                                           Daily Progress Note                                                             Kojo Linn PA-C    Pt Name: Lizzy Lazcano  Medical Record Number: [id number]  Date of Birth 1985   Today's Date: 4/27/2023    Chief Complaint: Abdominal pain    ASSESSMENT  Intraabdominal abscess  +erythema surrounding lateral OXANA drain. Erythema decreased from previous marking on skin- +purulent drainage from drain. More medial drain minimal purulent output. Denies any nausea or emesis  +BM's and +flatulence  Tolerating regular diet  Leukocytosis improving: WBC count 16.0 --> 12.0 -->11.3-->11.5  OOB and ambulate  OK to D/C home from a general surgery standpoint with antibiotics per ID. PIC line in place for home IV antibiotics. ID is anticipating 2 more weeks of IV antibiotics  F/U with Dr. Kelvin Lauren in 2 weeks    EDUCATION  Patient educated about their illness/diagnosis, stated above, and all questions answered. We discussed the importance of nutrition, medications they are taking, and healthy lifestyle. Dorothy Conde has improved from yesterday. Pain is well controlled. She has no nausea and no vomiting. She has passed flatus and is having a bowel movements. She is tolerating regular diet. Current activity is ad lul    OBJECTIVE  VITALS:  height is 5' 2\" (1.575 m) and weight is 293 lb 10.4 oz (133.2 kg). Her oral temperature is 99.1 °F (37.3 °C). Her blood pressure is 139/76 and her pulse is 98. Her respiration is 18 and oxygen saturation is 95%.  VITALS:  /76   Pulse 98   Temp 99.1 °F (37.3 °C) (Oral)   Resp 18   Ht 5' 2\" (1.575 m)   Wt 293 lb 10.4 oz (133.2 kg)   LMP 04/18/2023 (Exact Date)   SpO2 95%   BMI 53.71 kg/m²   GENERAL: alert, no distress  LUNGS: clear to ausculation, without wheezes, rales or rhonci  HEART: normal rate and regular
Hospitalist Progress Note  4/24/2023 9:39 AM    PCP: Lori Corcoran MD    [id number]     Date of Admission: 4/20/2023                                                                                                                     HOSPITAL COURSE    Patient demographics:  The patient  Miranda Novoa is a 44 y.o. female     Significant past medical history:   Patient Active Problem List   Diagnosis    Depression    Carpal tunnel syndrome    Depression with anxiety    Cubital tunnel syndrome    ADHD, predominantly inattentive type    Bipolar 2 disorder (HCC)    Moderate single current episode of major depressive disorder (HCC)    Choledocholithiasis    Intra-abdominal abscess (HCC)    Sepsis ([facility] [address]    Neutrophilia    Fever and chills    Morbid obesity with BMI of 50.0-59.9, adult ([facility]    History of laparoscopic cholecystectomy    Enterococcal infection    Severe sepsis (HCC)    Septicemia ([facility]         Presenting symptoms:      Diagnostic workup:      CONSULTS DURING ADMISSION :   IP CONSULT TO PHARMACY  IP CONSULT TO GENERAL SURGERY  IP CONSULT TO GENERAL SURGERY  PHARMACY TO DOSE VANCOMYCIN  IP CONSULT TO INFECTIOUS DISEASES      Patient was diagnosed with:  Sepsis      Treatment while inpatient:  44years old female with medical history significant for anxiety depression, bipolar disorder morbid obesity, patient underwent laparoscopic cholecystectomy on 4/4/2023 followed by ERCP on 4/5/2023. CT scan of the abdomen showed gallbladder fossa abscess                   4/18-IR placed a drain tube, abscess culture was positive for Streptococcus and Enterococcus    Patient presented to the emergency room with fever, leukocytosis and abdominal pain for this admission. Patient was diagnosed with sepsis. CT scan of the abdomen again showed an abscess with IR placed a drain tube on 4/21/2023 in the perihepatic abscess.
Hospitalist Progress Note  4/26/2023 10:09 AM    PCP: Charla Narayanan MD    [id number]     Date of Admission: 4/20/2023                                                                                                                     HOSPITAL COURSE    Patient demographics:  The patient  Blanche iLttle is a 44 y.o. female     Significant past medical history:   Patient Active Problem List   Diagnosis    Depression    Carpal tunnel syndrome    Depression with anxiety    Cubital tunnel syndrome    ADHD, predominantly inattentive type    Bipolar 2 disorder (HCC)    Moderate single current episode of major depressive disorder (HCC)    Choledocholithiasis    Intra-abdominal abscess (HCC)    Sepsis ([facility] [address]    Neutrophilia    Fever and chills    Morbid obesity with BMI of 50.0-59.9, adult ([facility]    History of laparoscopic cholecystectomy    Enterococcal infection    Severe sepsis (HCC)    Septicemia (HCC)    Elevated erythrocyte sedimentation rate    CRP elevated    Peripherally inserted central catheter (PICC) in place         Presenting symptoms:  Abdominal Pain    Diagnostic workup:      CONSULTS DURING ADMISSION :   IP CONSULT TO PHARMACY  IP CONSULT TO GENERAL SURGERY  IP CONSULT TO GENERAL SURGERY  PHARMACY TO DOSE VANCOMYCIN  IP CONSULT TO INFECTIOUS DISEASES      Patient was diagnosed with:  Sepsis  intra-abdominal abscess    Treatment while inpatient:  44years old female with medical history significant for anxiety depression, bipolar disorder morbid obesity, patient underwent laparoscopic cholecystectomy on 4/4/2023 followed by ERCP on 4/5/2023. CT scan of the abdomen showed gallbladder fossa abscess                   4/18-IR placed a drain tube, abscess culture was positive for Streptococcus and Enterococcus    Patient presented to the emergency room with fever, leukocytosis and abdominal pain for this admission. Patient was diagnosed with sepsis.   CT scan of the abdomen again showed an abscess
Infectious Diseases Inpatient Progress Note    CHIEF COMPLAINT:     Severe Sepsis  Fevers  WBC elevation   Intra abd abscess  S/p recent cholecystectomy          HISTORY OF PRESENT ILLNESS:  44 y.o. woman with a past medical history of anxiety, depression, bipolar disorder, obesity, BMI 53, thyroid disease patient underwent laparoscopic cystectomy  on 4/4/23 followed by ERCP on 4/5/23 for choledocholithiasis she underwent biliary sphincterotomy stone extraction and biliary stent placement. She had a follow-up on 4/17/23 noted to have significant pain so she underwent a CT abdomen pelvis indicated gallbladder fossa abscess. She underwent interventional radiology guided drain tube placement, 4/18/23-abscess culture positive for Streptococcus anginosus and Enterococcus faecium sensitivities pending. Patient now admitted through ER due to increasing pain noted a high fever up to 102 with elevated WBC 30,000 with left shift and ongoing sepsis. Repeat CT scan on this admission indicated new peripherally enhancing abscess collection along the anterolateral aspect of the liver measuring around 11.3X4.6X 7.7 CM-she had a new drain placement by interventional radiology on 4/21/23 in the perihepatic abscess area. Given the ongoing need for IV antibiotics we are consulted for recommendations.   Location :rt upper abd pain        Quality :   10/10    Severity :   aching,  Duration :  1 week    Timing : constant  Context : Recent cholecystectomy for stones and h/o ERCP    Modifying factors : NONE  Associated signs and symptoms:Fevers, chills pain-    Interval History :  sitting up in the chair and abd pain better drains working ok and WBC trend down will be going home on IV abx pt aware of the opat      Past Medical History:    Past Medical History:   Diagnosis Date    Acid reflux     mild    Anxiety and depression     Bipolar 1 disorder ([facility] [address]     Hiatal hernia     Migraine     Obesity     Thyroid disease     Wears
PICC PLACEMENT:    Upon arrival to place PICC line assessed chart for issues related to picc placement, check for consent, and did time out with Donny Duarte . Pt. Tolerated PICC placement well, no difficulty accessing left basilic vein and 3CG technology used to verify PICC tip placement. Positive P wave with no negative deflection. Printed wave form and placed In chart.  Reported off to  Donny Duarte    ok to use line
Patient complaining of pain throughout shift to her RUQ given prn pain medications as needed. Minimal drainage from catheters, PICC dressing changed to remove old drainage.  Patient JUAREZ took a walk around unit, placed portable tele box on patient
Physician Progress Note      PATIENT[facility] Beans Claude Cartwright  [facility] #:                  [id number]  :                       1985  ADMIT DATE:       2023 10:51 PM  [address] [place] DATE:  RESPONDING  PROVIDER #:        Meagan Luu MD          QUERY TEXT:    Pt admitted with Sepsis  and underwent of laparoscopic cholecystectomy ,   noted documentation of  Intra abdominal abscess  Gabriela hepatic abscess, Gallbladder fossa abscess. ? If possible, please document   in progress notes and discharge summary the relationship if any between the   sepsis/abscess and the surgery: The medical record reflects the following:  Risk Factors: s/p lap appy, ercp  Clinical Indicators: Patient underwent laparoscopic cystectomy  on 23   followed by ERCP on 23 for choledocholithiasis she underwent biliary   sphincterotomy stone extraction and biliary stent placement. She had a   follow-up on 23 noted to have significant pain so she underwent a CT   abdomen pelvis indicated gallbladder fossa abscess. She underwent   interventional radiology guided drain tube placement, 23-abscess culture   positive for Streptococcus anginosus and Enterococcus faecium sensitivities   pending. WBC 30,000 with left shift and ongoing sepsis. Repeat CT   Treatment: ID consult, Surg consult, drain placement, iv abx, monitor labs    Thank you, Celestine Garcia RN CDS CRCR CCDS  [email or web address]  Options provided:  -- Sepsis/abscess is due to the procedure  -- Sepsis/abscess  is not due to the procedure, but is due to other incidental   risk factor, Please specify other incidental risk factor. -- Other - I will add my own diagnosis  -- Disagree - Not applicable / Not valid  -- Disagree - Clinically unable to determine / Unknown  -- Refer to Clinical Documentation Reviewer    PROVIDER RESPONSE TEXT:    Patient has Sepsis/abscess due to the procedure. Query created by:  Teto Garcia on 2023 11:55 AM      Electronically
Progress Note  Date:2023       Room:E7Y-2883/5268-01  Patient Name:Lisandra Smith     YOB: 1985     Age:37 y.o. Subjective    Subjective:  Symptoms:  Stable. Diet:  No nausea or vomiting. Activity level: Impaired due to weakness. Pain:  She complains of pain that is mild. Review of Systems   Gastrointestinal:  Negative for nausea and vomiting. Objective         Vitals Last 24 Hours:  TEMPERATURE:  Temp  Av.9 °F (37.2 °C)  Min: 98 °F (36.7 °C)  Max: 99.3 °F (37.4 °C)  RESPIRATIONS RANGE: Resp  Av.4  Min: 16  Max: 21  PULSE OXIMETRY RANGE: SpO2  Av.5 %  Min: 95 %  Max: 96 %  PULSE RANGE: Pulse  Av.8  Min: 80  Max: 105  BLOOD PRESSURE RANGE: Systolic (82HSR), OZW:806 , Min:128 , WOL:966   ; Diastolic (55HUK), TUH:73, Min:58, Max:80    I/O (24Hr): Intake/Output Summary (Last 24 hours) at 2023 1501  Last data filed at 2023 0700  Gross per 24 hour   Intake 240 ml   Output 150 ml   Net 90 ml     Objective  Labs/Imaging/Diagnostics    Labs:  CBC:  Recent Labs     23  0807 23  0608 23  0510   WBC 19.2* 22.6* 16.0*   RBC 2.86* 2.88* 2.91*   HGB 8.5* 8.3* 8.1*   HCT 26.5* 25.8* 26.2*   MCV 92.5 89.6 89.8   RDW 16.2* 15.9* 15.8*   * 740* 840*     CHEMISTRIES:  Recent Labs     23  0807 23  0608 23  0510   * 131* 134*   K 4.0 3.5 3.6   CL 95* 93* 96*   CO2  26   BUN 7 4* 3*   CREATININE 0.6 0.6 0.5*   GLUCOSE 129* 148* 136*   PHOS 2.8 2.5 2.6   MG 2.50* 2.50* 2.60*     PT/INR:No results for input(s): PROTIME, INR in the last 72 hours. APTT:No results for input(s): APTT in the last 72 hours.   LIVER PROFILE:  Recent Labs     23  0807 23  0608 23  0510   AST 36 24 18   ALT 26 20 8*   BILITOT 0.8 0.9 0.6   ALKPHOS 174* 169* 156*       Imaging Last 24 Hours:  CT ABDOMEN PELVIS WO CONTRAST Additional Contrast? Radiologist Recommendation    Result Date: 2023  EXAMINATION: CT OF THE
Pt diet was advanced today from clears to regular. Pt is tolerating it well with no complaints of nausea and vomiting.  Electronically signed by Jay Dukes RN on 4/25/23 at 12:13 PM EDT
History:    Past Surgical History:   Procedure Laterality Date    CARPAL TUNNEL RELEASE Right 2014    CARPAL TUNNEL RELEASE Left 2016     SECTION   and     CHOLECYSTECTOMY, LAPAROSCOPIC N/A 2023    LAPAROSCOPIC CHOLECYSTECTOMY WITH CHOLANGIOGRAM performed by Danuta Dos Santos MD at [facility] Right 2023    10F. Dr. Pat Puckett, COLON, DIAGNOSTIC      ERCP N/A 2023    ERCP SPHINCTEROTOMY performed by Erika Sewell MD at [address] [facility]    ERCP N/A 2023    ERCP BALLOON SWEEP W/ STONE REMOVAL performed by Erika Sewell MD at [address] [facility]    ERCP N/A 2023    ERCP STENT INSERTION performed by Erika Sewell MD at [facility] Left 2016    WISDOM TOOTH EXTRACTION  2002       Current Medications:    Outpatient Medications Marked as Taking for the 23 encounter [facility] HOSPITAL Encounter)   Medication Sig Dispense Refill    pantoprazole (PROTONIX) 40 MG tablet Take 1 tablet by mouth daily         Allergies:  Patient has no active allergies.     Immunizations :   Immunization History   Administered Date(s) Administered    COVID-19, PFIZER PURPLE top, DILUTE for use, (age 15 y+), 30mcg/0.3mL 2021, 2021, 2021    Influenza, Intradermal, Preservative free 10/21/2014, 10/12/2015         Social History:     Social History     Tobacco Use    Smoking status: Former     Packs/day: 0.25     Years: 12.00     Pack years: 3.00     Types: Cigarettes     Quit date: 2021     Years since quittin.3    Smokeless tobacco: Never   Vaping Use    Vaping Use: Never used   Substance Use Topics    Alcohol use: Yes     Comment: rare    Drug use: Never     Social History     Tobacco Use   Smoking Status Former    Packs/day: 0.25    Years: 12.00    Pack years: 3.00    Types: Cigarettes    Quit date: 2021    Years since quittin.3   Smokeless Tobacco Never      Family History
with IR placed a drain tube on 4/21/2023 in the perihepatic abscess. ----------------------------------------------------------      SUBJECTIVE COMPLAINTS- Abdominal Pain    Diet: ADULT DIET; Regular      OBJECTIVE:   Patient Active Problem List   Diagnosis    Depression    Carpal tunnel syndrome    Depression with anxiety    Cubital tunnel syndrome    ADHD, predominantly inattentive type    Bipolar 2 disorder (HCC)    Moderate single current episode of major depressive disorder (HCC)    Choledocholithiasis    Intra-abdominal abscess (HCC)    Sepsis (HCC)    Neutrophilia    Fever and chills    Morbid obesity with BMI of 50.0-59.9, adult ([facility]    History of laparoscopic cholecystectomy    Enterococcal infection    Severe sepsis (HCC)    Septicemia (HCC)    Elevated erythrocyte sedimentation rate    CRP elevated    Peripherally inserted central catheter (PICC) in place       Allergies  Patient has no active allergies.     Medications    Scheduled Meds:   sodium chloride flush  5-40 mL IntraVENous 2 times per day    cariprazine hcl  1.5 mg Oral Daily    amphetamine-dextroamphetamine  10 mg Oral BID    folic acid  1 mg Oral Daily    lamoTRIgine  25 mg Oral BID    levothyroxine  50 mcg Oral Daily    pantoprazole  40 mg Oral Daily    sodium chloride flush  5-40 mL IntraVENous 2 times per day    enoxaparin  30 mg SubCUTAneous BID    piperacillin-tazobactam  4,500 mg IntraVENous Q8H     Continuous Infusions:   sodium chloride       PRN Meds:  sodium chloride flush, sodium chloride, traMADol, hydrOXYzine HCl, sodium chloride flush, acetaminophen **OR** acetaminophen, naloxone, [DISCONTINUED] morphine **OR** morphine, simethicone    Vitals   Vitals /wt Patient Vitals for the past 8 hrs:   BP Temp Temp src Pulse Resp SpO2 Weight   04/25/23 0931 -- -- -- 94 -- -- --   04/25/23 0848 -- -- -- -- 16 -- --   04/25/23 0811 (!) 159/91 98.3 °F (36.8 °C) Oral 98 17 -- --   04/25/23
or obstruction. Peritoneum: Persistent small pocket of fluid adjacent to bowel in the right lower quadrant which measures approximately 1.4 cm in thickness, previously 2 cm. No layering fluid in the pelvis. Scattered foci of gas attenuation adjacent to the liver likely secondary to the presence of percutaneous drainage catheters. Lymph nodes: No evidence of suspicious lymphadenopathy. Vasculature: Normal. Soft Tissues/Bones: Diffuse body wall edema is present. No acute osseous abnormalities or suspicious lesions are present. Small amount of residual fluid and air associated with pericapsular and gallbladder fossa collections in the setting of 2 separate locking pigtail drainage catheters. Evaluation is somewhat limited by the absence of intravenous contrast. No significant interval change in small pocket of fluid adjacent to bowel in the right lower quadrant. Stable appearance of small right pleural effusion with associated consolidation.      Assessment//Plan           Hospital Problems             Last Modified POA    * (Principal) Sepsis ([address] 4/21/2023 Yes    Intra-abdominal abscess ([address] 4/21/2023 Yes    Neutrophilia 4/22/2023 Yes    Fever and chills 4/22/2023 Yes    Morbid obesity with BMI of 50.0-59.9, adult ([address] 4/22/2023 Yes    History of laparoscopic cholecystectomy 4/22/2023 Yes    Enterococcal infection 4/22/2023 Yes    Severe sepsis ([address] 4/22/2023 Yes    Septicemia ([address] 4/22/2023 Yes    Elevated erythrocyte sedimentation rate 4/25/2023 Yes    CRP elevated 4/25/2023 Yes    Peripherally inserted central catheter (PICC) in place 4/25/2023 Yes     Assessment & Plan    Slowly improving   Drains slowing, CT improved yesterday   Advance diet today   If stable plan home tomorrow    Electronically signed by Jaswant Gong MD on 4/25/2023 at 10:50 AM    Electronically signed by Jaswant Gong MD on 4/25/23 at 10:50 AM EDT
Any change in status will be called. Narrative:     ORDER#: [id number]                          ORDERED BY: JHON ELLIS   SOURCE: Blood                              COLLECTED:  04/21/23 06:40   ANTIBIOTICS AT [facility].:                      RECEIVED :  04/21/23 08:02   If child <=2 yrs old please draw pediatric bottle. ~Blood Culture #2   Culture, Fungus [[id number]] Collected: 04/21/23 1116   Order Status: Sent Specimen: Abscess Updated: 04/22/23 1155    Fungus Stain No Fungal elements seen   Narrative:     ORDER#: [id number]                          ORDERED BY: Amanda Body   SOURCE: Abscess                            COLLECTED:  04/21/23 11:16   ANTIBIOTICS AT [facility].:                      RECEIVED :  04/21/23 14:17   Culture, Blood 1 [[id number]] Collected: 04/21/23 0009   Order Status: Completed Specimen: Blood Updated: 04/22/23 0215    Blood Culture, Routine No Growth to date. Any change in status will be called. Narrative:     ORDER#: [id number]                          ORDERED BY: JHON ELLIS   SOURCE: Blood                              COLLECTED:  04/21/23 00:09   ANTIBIOTICS AT [facility].:                      RECEIVED :  04/21/23 00:23   If child <=2 yrs old please draw pediatric bottle. ~Blood Culture 1   COVID-19, Rapid [[id number]] Collected: 04/21/23 0120   Order Status: Completed Specimen: Nasopharyngeal Swab Updated: 04/21/23 0225    SARS-CoV-2, NAAT Not Detected    Comment: Rapid NAAT:   Negative results should be treated as presumptive and,   if inconsistent with clinical signs and symptoms or necessary for   patient management, should be tested with an alternative molecular   assay. Negative results do not preclude SARS-CoV-2 infection and   should not be used as the sole basis for patient management decisions. This test has been authorized by the FDA under an Emergency Use   Authorization (EUA) for use by authorized laboratories.      Fact sheet for Healthcare Providers:
http://www.kiana.amy/   Fact sheet for Patients: http://www.kiana.amy/     METHODOLOGY: Isothermal Nucleic Acid Amplification         Viral Culture:    Lab Results   Component Value Date/Time    COVID19 Not Detected 04/21/2023 01:20 AM     Urine Culture: No results for input(s): LABURIN in the last 72 hours. Scheduled Meds:   cariprazine hcl  1.5 mg Oral Daily    amphetamine-dextroamphetamine  10 mg Oral BID    folic acid  1 mg Oral Daily    lamoTRIgine  25 mg Oral BID    levothyroxine  50 mcg Oral Daily    pantoprazole  40 mg Oral Daily    sodium chloride flush  5-40 mL IntraVENous 2 times per day    enoxaparin  30 mg SubCUTAneous BID    piperacillin-tazobactam  4,500 mg IntraVENous Q8H       Continuous Infusions:   sodium chloride 10 mL/hr at 04/22/23 0057       PRN Meds:  traMADol, hydrOXYzine HCl, sodium chloride flush, sodium chloride, acetaminophen **OR** acetaminophen, naloxone, [DISCONTINUED] morphine **OR** morphine, simethicone    Imaging:   CT ABSCESS DRAINAGE W CATH PLACEMENT S&I   Final Result   Successful CT guided placement of right Gabriela hepatic abscess drainage   catheter. CT GUIDED NEEDLE PLACEMENT   Final Result   Successful CT guided placement of right Gabriela hepatic abscess drainage   catheter. CT ABDOMEN PELVIS W IV CONTRAST Additional Contrast? None   Final Result   1. Postsurgical changes of recent cholecystectomy with common bile duct   stent in place. Right upper quadrant stranding with a small amount of   intraperitoneal free air which may be postsurgical.      2.  Right upper quadrant percutaneous drainage catheter with the distal   portion coiled in the gallbladder fossa. Significantly improved but   persistent air containing fluid collection in the gallbladder fossa. This   could represent an abscess or biloma.       3.  New peripherally enhancing air containing fluid collection along the   anterolateral aspect of the right

## 2023-04-28 ENCOUNTER — TELEPHONE (OUTPATIENT)
Dept: INFECTIOUS DISEASES | Age: 38
End: 2023-04-28

## 2023-04-28 DIAGNOSIS — A41.9 SEVERE SEPSIS (HCC): ICD-10-CM

## 2023-04-28 DIAGNOSIS — K65.1 INTRA-ABDOMINAL ABSCESS (HCC): Primary | ICD-10-CM

## 2023-04-28 DIAGNOSIS — R65.20 SEVERE SEPSIS (HCC): ICD-10-CM

## 2023-04-28 NOTE — TELEPHONE ENCOUNTER
Pt returned call  Pt states she is doing well with IV ATB  Reviewed, with pt, to flush PICC with saline, provided by [employer], between each dose change  Pt is scheduled for 5/10 at 1230 - I did notify pt she may receive a call from [facility], Dr Mc Tobin nurse, should this appt need to be changed as I did add her on to his clinic that day. Pt was given physical address to Dr Telma Ferrer office.   Pt verbalized understanidng and repeated back address correctly  Pt is set up with Karen Ag for Monday May 1 for PICC care and lab draw  I asked pt to call me today with any questions or concerns as I am covering for Kelsi  Pt verbalized understanding and was given Tiaras number should questions arise after today

## 2023-04-28 NOTE — TELEPHONE ENCOUNTER
Left message for pt  Dr Parnell Speaks name, my direct number  Need 2 week follow up appt and set up with [facility] for picc care and lab draws weekly on Mon or Tues  Pt will term on Monday 5/15  Pt currently on IV Zosyn 13.5gm continuous infusion

## 2023-05-01 ENCOUNTER — HOSPITAL ENCOUNTER (OUTPATIENT)
Dept: INFUSION THERAPY | Age: 38
Setting detail: INFUSION SERIES
Discharge: HOME | End: 2023-05-01
Payer: MEDICAID

## 2023-05-01 VITALS
DIASTOLIC BLOOD PRESSURE: 56 MMHG | HEART RATE: 100 BPM | SYSTOLIC BLOOD PRESSURE: 128 MMHG | OXYGEN SATURATION: 95 % | RESPIRATION RATE: 18 BRPM | TEMPERATURE: 98.5 F

## 2023-05-01 DIAGNOSIS — A41.9 SEVERE SEPSIS (HCC): Primary | ICD-10-CM

## 2023-05-01 DIAGNOSIS — R65.20 SEVERE SEPSIS (HCC): Primary | ICD-10-CM

## 2023-05-01 LAB
ALBUMIN SERPL-MCNC: 2.6 G/DL (ref 3.4–5)
ALBUMIN/GLOB SERPL: 0.6 {RATIO} (ref 1.1–2.2)
ALP SERPL-CCNC: 110 U/L (ref 40–129)
ALT SERPL-CCNC: 7 U/L (ref 10–40)
ANION GAP SERPL CALCULATED.3IONS-SCNC: 11 MMOL/L (ref 3–16)
AST SERPL-CCNC: 14 U/L (ref 15–37)
BASOPHILS # BLD: 0.1 K/UL (ref 0–0.2)
BASOPHILS NFR BLD: 0.9 %
BILIRUB SERPL-MCNC: 0.3 MG/DL (ref 0–1)
BUN SERPL-MCNC: 5 MG/DL (ref 7–20)
CALCIUM SERPL-MCNC: 8.6 MG/DL (ref 8.3–10.6)
CHLORIDE SERPL-SCNC: 99 MMOL/L (ref 99–110)
CO2 SERPL-SCNC: 26 MMOL/L (ref 21–32)
CREAT SERPL-MCNC: 0.8 MG/DL (ref 0.6–1.1)
CRP SERPL-MCNC: 128.6 MG/L (ref 0–5.1)
DEPRECATED RDW RBC AUTO: 15.9 % (ref 12.4–15.4)
EOSINOPHIL # BLD: 0.2 K/UL (ref 0–0.6)
EOSINOPHIL NFR BLD: 2.3 %
ERYTHROCYTE [SEDIMENTATION RATE] IN BLOOD BY WESTERGREN METHOD: 74 MM/HR (ref 0–20)
FUNGUS SPEC CULT: NORMAL
GFR SERPLBLD CREATININE-BSD FMLA CKD-EPI: >60 ML/MIN/{1.73_M2}
GLUCOSE SERPL-MCNC: 113 MG/DL (ref 70–99)
HCT VFR BLD AUTO: 24.8 % (ref 36–48)
HGB BLD-MCNC: 8.2 G/DL (ref 12–16)
LOEFFLER MB STN SPEC: NORMAL
LYMPHOCYTES # BLD: 1.4 K/UL (ref 1–5.1)
LYMPHOCYTES NFR BLD: 19.4 %
MCH RBC QN AUTO: 29.7 PG (ref 26–34)
MCHC RBC AUTO-ENTMCNC: 32.9 G/DL (ref 31–36)
MCV RBC AUTO: 90.3 FL (ref 80–100)
MONOCYTES # BLD: 0.5 K/UL (ref 0–1.3)
MONOCYTES NFR BLD: 7.4 %
NEUTROPHILS # BLD: 5.1 K/UL (ref 1.7–7.7)
NEUTROPHILS NFR BLD: 70 %
PLATELET # BLD AUTO: 706 K/UL (ref 135–450)
PMV BLD AUTO: 6.1 FL (ref 5–10.5)
POTASSIUM SERPL-SCNC: 3.8 MMOL/L (ref 3.5–5.1)
PROT SERPL-MCNC: 7.2 G/DL (ref 6.4–8.2)
RBC # BLD AUTO: 2.75 M/UL (ref 4–5.2)
SODIUM SERPL-SCNC: 136 MMOL/L (ref 136–145)
WBC # BLD AUTO: 7.3 K/UL (ref 4–11)

## 2023-05-01 PROCEDURE — 36592 COLLECT BLOOD FROM PICC: CPT

## 2023-05-01 PROCEDURE — 86140 C-REACTIVE PROTEIN: CPT

## 2023-05-01 PROCEDURE — 2580000003 HC RX 258: Performed by: INTERNAL MEDICINE

## 2023-05-01 PROCEDURE — 85652 RBC SED RATE AUTOMATED: CPT

## 2023-05-01 PROCEDURE — 85025 COMPLETE CBC W/AUTO DIFF WBC: CPT

## 2023-05-01 PROCEDURE — 80053 COMPREHEN METABOLIC PANEL: CPT

## 2023-05-01 PROCEDURE — 99212 OFFICE O/P EST SF 10 MIN: CPT

## 2023-05-01 RX ORDER — SODIUM CHLORIDE 0.9 % (FLUSH) 0.9 %
5-40 SYRINGE (ML) INJECTION PRN
Status: DISCONTINUED | OUTPATIENT
Start: 2023-05-01 | End: 2023-05-02 | Stop reason: HOSPADM

## 2023-05-01 RX ORDER — OXYCODONE HYDROCHLORIDE 15 MG/1
15 TABLET ORAL EVERY 6 HOURS PRN
Qty: 20 TABLET | Refills: 0 | Status: SHIPPED | OUTPATIENT
Start: 2023-05-01 | End: 2023-05-06

## 2023-05-01 RX ORDER — SODIUM CHLORIDE 0.9 % (FLUSH) 0.9 %
5-40 SYRINGE (ML) INJECTION PRN
Status: CANCELLED | OUTPATIENT
Start: 2023-05-08

## 2023-05-01 RX ADMIN — SODIUM CHLORIDE, PRESERVATIVE FREE 40 ML: 5 INJECTION INTRAVENOUS at 14:00

## 2023-05-01 NOTE — PROGRESS NOTES
[facility]    PICC Lab Draw    NAME:  Rainer Quiroz  YOB: 1985  MEDICAL RECORD NUMBER:  [id number]  DATE:  5/1/2023    Patient arrived to [place] [address]   [] per wheelchair   [x] ambulatory     Patient had Lap Linda on 4/4/23. ERCP biliary Stent placement pn 4/5/23 4/18/23 Found abcess near liver. IR placed 2 drainage tubes. Culture shows Streptococcus and Enterococcus. Patient has a continuous Zosyn 13.5 grams IV per PICC line. Patient only draining about 25-50 ml per day and she may have drainage tubes this week. PICC Location:left arm    PICC Site:  Redness: No  Bruising: No   Edema: No  Pain: No     PICC Labs  Cap cleansed with Chloroprep Scrub for 30 seconds and air dried completely for 1 minute on sterile 4X4: Yes  Blood drawn using a 10/ml syringe  Amount of blood wasted 10/ml syringe    Labs Obtained: Yes  Lab Test(s) Ordered: CBC with diff, CMP, ESR, CRP  PICC Flushed with 20ml/NS: Yes  New Cap applied: Yes     Response to treatment:  Well tolerated by patient. Outpatient [address]    PICC Dressing Change    NAME:  Rainer Quiroz  YOB: 1985  MEDICAL RECORD NUMBER:  [id number]  DATE:  5/1/2023    Patient arrived to [place] [address]   [] per wheelchair   [x] ambulatory     PICC Location:left arm    PICC Site:  Redness: No  Bruising: No   Edema: No  Pain: No     PICC Cleansed:  Current dressing and stat lock removed: Yes  Chloroprep Scrub for 30 seconds and air dried completely for 1 minute: Yes     PICC Dressing Change  Skin barrier: Yes  Stat lock applied and PICC line secured Yes  Biopatch applied: Yes Dressing with liq patch of chloraprep  PICC site covered with Tegaderm Yes  Date and initials applied to PICC dressing Yes  [] Other:    Next Dressing Due: May 8, 2023. Response to treatment:  Well tolerated by patient.       Electronically signed

## 2023-05-01 NOTE — DISCHARGE INSTRUCTIONS
Outpatient Infusion Discharge Instructions  [place] [address] [facility] [address] [address], [address]  Telephone: [phone]    NAME:  Lesa Garcia OF BIRTH:  1985  MEDICAL RECORD NUMBER:  [id number]  DATE:  @ED@    Reason for Outpatient Infusion Visit: PICC Line Dressing change and PICC Labs    If you develop any these symptoms please contact you Doctor    [] Nausea and/or vomiting not relieved with medication   [] Swelling, redness, and/or bleeding at injection or IV site    [] Fever or chills  [] Rash or itching   [] Shortness of breath  [] Please review After Visit Summary (AVS) information on    [] Other      Outpatient [address]: Should you experience any significant changes in your health or have questions about your care please contact the [address] at [address] 8:00 am - 4:00 pm.  If you need help outside these hours and cannot wait until we are again available, contact your Primary Care Physician or go to the hospital emergency room.        Electronically signed by Britney Mccormick RN on 5/1/2023 at 2:01 PM

## 2023-05-02 ENCOUNTER — ANESTHESIA EVENT (OUTPATIENT)
Dept: ENDOSCOPY | Age: 38
End: 2023-05-02
Payer: MEDICAID

## 2023-05-03 ENCOUNTER — APPOINTMENT (OUTPATIENT)
Dept: GENERAL RADIOLOGY | Age: 38
End: 2023-05-03
Attending: INTERNAL MEDICINE
Payer: MEDICAID

## 2023-05-03 ENCOUNTER — HOSPITAL ENCOUNTER (OUTPATIENT)
Age: 38
Setting detail: OUTPATIENT SURGERY
Discharge: HOME | End: 2023-05-03
Attending: INTERNAL MEDICINE | Admitting: INTERNAL MEDICINE
Payer: MEDICAID

## 2023-05-03 ENCOUNTER — ANESTHESIA (OUTPATIENT)
Dept: ENDOSCOPY | Age: 38
End: 2023-05-03
Payer: MEDICAID

## 2023-05-03 VITALS
WEIGHT: 293 LBS | SYSTOLIC BLOOD PRESSURE: 126 MMHG | TEMPERATURE: 97.1 F | HEART RATE: 105 BPM | BODY MASS INDEX: 53.92 KG/M2 | RESPIRATION RATE: 18 BRPM | HEIGHT: 62 IN | DIASTOLIC BLOOD PRESSURE: 73 MMHG | OXYGEN SATURATION: 92 %

## 2023-05-03 PROCEDURE — 6360000004 HC RX CONTRAST MEDICATION: Performed by: INTERNAL MEDICINE

## 2023-05-03 PROCEDURE — 2580000003 HC RX 258: Performed by: NURSE ANESTHETIST, CERTIFIED REGISTERED

## 2023-05-03 PROCEDURE — 7100000000 HC PACU RECOVERY - FIRST 15 MIN: Performed by: INTERNAL MEDICINE

## 2023-05-03 PROCEDURE — 7100000010 HC PHASE II RECOVERY - FIRST 15 MIN: Performed by: INTERNAL MEDICINE

## 2023-05-03 PROCEDURE — 7100000011 HC PHASE II RECOVERY - ADDTL 15 MIN: Performed by: INTERNAL MEDICINE

## 2023-05-03 PROCEDURE — 7100000001 HC PACU RECOVERY - ADDTL 15 MIN: Performed by: INTERNAL MEDICINE

## 2023-05-03 PROCEDURE — 2709999900 HC NON-CHARGEABLE SUPPLY: Performed by: INTERNAL MEDICINE

## 2023-05-03 PROCEDURE — 2720000010 HC SURG SUPPLY STERILE: Performed by: INTERNAL MEDICINE

## 2023-05-03 PROCEDURE — 3700000000 HC ANESTHESIA ATTENDED CARE: Performed by: INTERNAL MEDICINE

## 2023-05-03 PROCEDURE — 74328 X-RAY BILE DUCT ENDOSCOPY: CPT

## 2023-05-03 PROCEDURE — C1769 GUIDE WIRE: HCPCS | Performed by: INTERNAL MEDICINE

## 2023-05-03 PROCEDURE — 2500000003 HC RX 250 WO HCPCS: Performed by: NURSE ANESTHETIST, CERTIFIED REGISTERED

## 2023-05-03 PROCEDURE — C2625 STENT, NON-COR, TEM W/DEL SY: HCPCS | Performed by: INTERNAL MEDICINE

## 2023-05-03 PROCEDURE — 3609015200 HC ERCP REMOVE CALCULI/DEBRIS BILIARY/PANCREAS DUCT: Performed by: INTERNAL MEDICINE

## 2023-05-03 PROCEDURE — 6360000002 HC RX W HCPCS: Performed by: NURSE ANESTHETIST, CERTIFIED REGISTERED

## 2023-05-03 PROCEDURE — 3609014500 HC ERCP BILIARY/PANC DUCT STENT EXCHANGE W/DIL&WIRE: Performed by: INTERNAL MEDICINE

## 2023-05-03 PROCEDURE — 3700000001 HC ADD 15 MINUTES (ANESTHESIA): Performed by: INTERNAL MEDICINE

## 2023-05-03 DEVICE — BILIARY STENT WITH NAVIFLEXTM RX DELIVERY SYSTEM
Type: IMPLANTABLE DEVICE | Site: BILE DUCT | Status: FUNCTIONAL
Brand: ADVANIX™ BILIARY

## 2023-05-03 RX ORDER — SODIUM CHLORIDE 0.9 % (FLUSH) 0.9 %
5-40 SYRINGE (ML) INJECTION PRN
Status: DISCONTINUED | OUTPATIENT
Start: 2023-05-03 | End: 2023-05-03 | Stop reason: SDUPTHER

## 2023-05-03 RX ORDER — SODIUM CHLORIDE 0.9 % (FLUSH) 0.9 %
5-40 SYRINGE (ML) INJECTION EVERY 12 HOURS SCHEDULED
Status: DISCONTINUED | OUTPATIENT
Start: 2023-05-03 | End: 2023-05-03 | Stop reason: HOSPADM

## 2023-05-03 RX ORDER — ONDANSETRON 2 MG/ML
4 INJECTION INTRAMUSCULAR; INTRAVENOUS
Status: DISCONTINUED | OUTPATIENT
Start: 2023-05-03 | End: 2023-05-03 | Stop reason: HOSPADM

## 2023-05-03 RX ORDER — MIDAZOLAM HYDROCHLORIDE 1 MG/ML
INJECTION INTRAMUSCULAR; INTRAVENOUS PRN
Status: DISCONTINUED | OUTPATIENT
Start: 2023-05-03 | End: 2023-05-03 | Stop reason: SDUPTHER

## 2023-05-03 RX ORDER — SODIUM CHLORIDE 9 MG/ML
INJECTION, SOLUTION INTRAVENOUS CONTINUOUS PRN
Status: DISCONTINUED | OUTPATIENT
Start: 2023-05-03 | End: 2023-05-03 | Stop reason: SDUPTHER

## 2023-05-03 RX ORDER — MEPERIDINE HYDROCHLORIDE 25 MG/ML
12.5 INJECTION INTRAMUSCULAR; INTRAVENOUS; SUBCUTANEOUS
Status: DISCONTINUED | OUTPATIENT
Start: 2023-05-03 | End: 2023-05-03 | Stop reason: HOSPADM

## 2023-05-03 RX ORDER — DEXAMETHASONE SODIUM PHOSPHATE 4 MG/ML
INJECTION, SOLUTION INTRA-ARTICULAR; INTRALESIONAL; INTRAMUSCULAR; INTRAVENOUS; SOFT TISSUE PRN
Status: DISCONTINUED | OUTPATIENT
Start: 2023-05-03 | End: 2023-05-03 | Stop reason: SDUPTHER

## 2023-05-03 RX ORDER — FENTANYL CITRATE 50 UG/ML
50 INJECTION, SOLUTION INTRAMUSCULAR; INTRAVENOUS EVERY 5 MIN PRN
Status: DISCONTINUED | OUTPATIENT
Start: 2023-05-03 | End: 2023-05-03 | Stop reason: HOSPADM

## 2023-05-03 RX ORDER — FENTANYL CITRATE 50 UG/ML
INJECTION, SOLUTION INTRAMUSCULAR; INTRAVENOUS PRN
Status: DISCONTINUED | OUTPATIENT
Start: 2023-05-03 | End: 2023-05-03 | Stop reason: SDUPTHER

## 2023-05-03 RX ORDER — GLYCOPYRROLATE 0.2 MG/ML
INJECTION INTRAMUSCULAR; INTRAVENOUS PRN
Status: DISCONTINUED | OUTPATIENT
Start: 2023-05-03 | End: 2023-05-03 | Stop reason: SDUPTHER

## 2023-05-03 RX ORDER — SODIUM CHLORIDE 0.9 % (FLUSH) 0.9 %
5-40 SYRINGE (ML) INJECTION EVERY 12 HOURS SCHEDULED
Status: DISCONTINUED | OUTPATIENT
Start: 2023-05-03 | End: 2023-05-03 | Stop reason: SDUPTHER

## 2023-05-03 RX ORDER — SODIUM CHLORIDE 9 MG/ML
INJECTION, SOLUTION INTRAVENOUS PRN
Status: DISCONTINUED | OUTPATIENT
Start: 2023-05-03 | End: 2023-05-03 | Stop reason: HOSPADM

## 2023-05-03 RX ORDER — PROPOFOL 10 MG/ML
INJECTION, EMULSION INTRAVENOUS PRN
Status: DISCONTINUED | OUTPATIENT
Start: 2023-05-03 | End: 2023-05-03 | Stop reason: SDUPTHER

## 2023-05-03 RX ORDER — SODIUM CHLORIDE 9 MG/ML
INJECTION, SOLUTION INTRAVENOUS PRN
Status: DISCONTINUED | OUTPATIENT
Start: 2023-05-03 | End: 2023-05-03 | Stop reason: SDUPTHER

## 2023-05-03 RX ORDER — ONDANSETRON 2 MG/ML
INJECTION INTRAMUSCULAR; INTRAVENOUS PRN
Status: DISCONTINUED | OUTPATIENT
Start: 2023-05-03 | End: 2023-05-03 | Stop reason: SDUPTHER

## 2023-05-03 RX ORDER — SUCCINYLCHOLINE/SOD CL,ISO/PF 200MG/10ML
SYRINGE (ML) INTRAVENOUS PRN
Status: DISCONTINUED | OUTPATIENT
Start: 2023-05-03 | End: 2023-05-03 | Stop reason: SDUPTHER

## 2023-05-03 RX ORDER — FENTANYL CITRATE 50 UG/ML
25 INJECTION, SOLUTION INTRAMUSCULAR; INTRAVENOUS EVERY 5 MIN PRN
Status: DISCONTINUED | OUTPATIENT
Start: 2023-05-03 | End: 2023-05-03 | Stop reason: HOSPADM

## 2023-05-03 RX ORDER — LIDOCAINE HYDROCHLORIDE 20 MG/ML
INJECTION, SOLUTION EPIDURAL; INFILTRATION; INTRACAUDAL; PERINEURAL PRN
Status: DISCONTINUED | OUTPATIENT
Start: 2023-05-03 | End: 2023-05-03 | Stop reason: SDUPTHER

## 2023-05-03 RX ORDER — SODIUM CHLORIDE 0.9 % (FLUSH) 0.9 %
5-40 SYRINGE (ML) INJECTION PRN
Status: DISCONTINUED | OUTPATIENT
Start: 2023-05-03 | End: 2023-05-03 | Stop reason: HOSPADM

## 2023-05-03 RX ADMIN — ONDANSETRON 4 MG: 2 INJECTION INTRAMUSCULAR; INTRAVENOUS at 09:42

## 2023-05-03 RX ADMIN — PROPOFOL 200 MG: 10 INJECTION, EMULSION INTRAVENOUS at 09:21

## 2023-05-03 RX ADMIN — SODIUM CHLORIDE: 9 INJECTION, SOLUTION INTRAVENOUS at 09:15

## 2023-05-03 RX ADMIN — Medication 160 MG: at 09:21

## 2023-05-03 RX ADMIN — FENTANYL CITRATE 75 MCG: 50 INJECTION INTRAMUSCULAR; INTRAVENOUS at 09:21

## 2023-05-03 RX ADMIN — HYDROMORPHONE HYDROCHLORIDE 1 MG: 1 INJECTION, SOLUTION INTRAMUSCULAR; INTRAVENOUS; SUBCUTANEOUS at 09:42

## 2023-05-03 RX ADMIN — LIDOCAINE HYDROCHLORIDE 80 MG: 20 INJECTION, SOLUTION EPIDURAL; INFILTRATION; INTRACAUDAL; PERINEURAL at 09:21

## 2023-05-03 RX ADMIN — GLYCOPYRROLATE 0.2 MG: 0.2 INJECTION INTRAMUSCULAR; INTRAVENOUS at 09:15

## 2023-05-03 RX ADMIN — FENTANYL CITRATE 25 MCG: 50 INJECTION INTRAMUSCULAR; INTRAVENOUS at 09:37

## 2023-05-03 RX ADMIN — DEXAMETHASONE SODIUM PHOSPHATE 8 MG: 4 INJECTION, SOLUTION INTRAMUSCULAR; INTRAVENOUS at 09:21

## 2023-05-03 RX ADMIN — MIDAZOLAM 1 MG: 1 INJECTION INTRAMUSCULAR; INTRAVENOUS at 09:21

## 2023-05-03 RX ADMIN — MIDAZOLAM 1 MG: 1 INJECTION INTRAMUSCULAR; INTRAVENOUS at 09:15

## 2023-05-03 ASSESSMENT — ENCOUNTER SYMPTOMS: SHORTNESS OF BREATH: 0

## 2023-05-03 ASSESSMENT — PAIN SCALES - GENERAL
PAINLEVEL_OUTOF10: 0

## 2023-05-03 NOTE — H&P
Pre-operative History and Physical    Patient: Rustam Diane  : 1985  Acct#:     HISTORY OF PRESENT ILLNESS:    The patient is a 44 y.o. female who presents with a bile duct stone on IOC. I spoke with Dr. Roper Staff and the cystic duct was so inflamed he is concerned about a bile leak and did request a stent to be placed. ERCP with biliary sphincterotomy performed 23 and stone removed. No obvious bile leak but I did place a stent given the intraoperative inflammation. Planning stent removal and re-evaluation today. Past Medical History:        Diagnosis Date    Acid reflux     mild    Anxiety and depression     Bipolar 1 disorder (HCC)     Hiatal hernia     Migraine     Obesity     Thyroid disease     Wears glasses       Past Surgical History:        Procedure Laterality Date    CARPAL TUNNEL RELEASE Right 2014    CARPAL TUNNEL RELEASE Left 2016     SECTION   and     CHOLECYSTECTOMY, LAPAROSCOPIC N/A 2023    LAPAROSCOPIC CHOLECYSTECTOMY WITH CHOLANGIOGRAM performed by Kiya Preston MD at [place] [address] Right 2023    10F. Dr. Givens Akin, COLON, DIAGNOSTIC      ERCP N/A 2023    ERCP SPHINCTEROTOMY performed by Elizabeth Howe MD at [facility]    ERCP N/A 2023    ERCP BALLOON SWEEP W/ STONE REMOVAL performed by Elizabeth Howe MD at [facility]    ERCP N/A 2023    ERCP STENT INSERTION performed by Elizabeth Howe MD at [address] [place] [address] Left 2016    WISDOM TOOTH EXTRACTION  2002      Medications Prior to Admission:   No current facility-administered medications on file prior to encounter.      Current Outpatient Medications on File Prior to Encounter   Medication Sig Dispense Refill    lamoTRIgine (LAMICTAL) 25 MG tablet Take 1 tablet by mouth 2 times daily      cariprazine hcl (VRAYLAR) 1.5 MG capsule Take 1 capsule by mouth daily      hydrOXYzine

## 2023-05-03 NOTE — OP NOTE
Endoscopy Note    Patient: Opal Pate   : 1985  Acct#:     Procedure: Endoscopic retrograde cholangiopancreatography with stent change  Interpretation of fluoroscopy    Date: 2023    Surgeon:  Eveline Santana MD    Referring Physician:  Dr. Lam Killian    Anesthesia:  General per Anesthesia. Indications: This is a 44y.o. year old female who presents today with a bile duct stone on IOC. I spoke with Dr. Lam Killian and the cystic duct was so inflamed he is concerned about a bile leak and did request a stent to be placed. ERCP with biliary sphincterotomy performed 23 and stone removed. No obvious bile leak but I did place a stent given the intraoperative inflammation. Planning stent removal and re-evaluation today. Consent: Informed consent was obtained from the patient after explanation of indications, benefits, possible risks and complications of the procedure. Specifically the risk of bleeding, infection, perforation, pancreatitis, anesthesia complications, and remote possibility of mortality among others were explained. Monitoring: Patient was monitored with continuous pulse oximetry, telemetry, and intermittent blood pressures. Procedure:   A time-out was performed. The patient and staff were in agreement as to the correct patient and procedure. The above anesthesia was administered by the anesthesia department. The patient was placed in the left lateral prone position. The Olympus therapeutic duodenoscope was placed in the patient's mouth and blindly advanced into the esophagus. The scope was then advanced through the esophagus, stomach and into the second portion of the duodenum where the major papilla was visualized. Major Papilla: On the distal edge of a diverticulum.  Film:  Stent in good position, drain in place, clips in the RUQ. Distal aspect of the biliary stent had food debris.   The stent was grasped with a snare and removed through the

## 2023-05-03 NOTE — ANESTHESIA PRE PROCEDURE
[facility] Department of Anesthesiology  Pre-Anesthesia Evaluation/Consultation       Name:  Rustam Diane  : 1985  Age:  44 y.o. MRN:  [id number]  Date: 5/3/2023           Surgeon: Surgeon(s):  Elizabeth Howe MD    Procedure: Procedure(s):  ENDOSCOPIC RETROGRADE CHOLANGIOPANCREATOGRAPHY     No Known Allergies  Patient Active Problem List   Diagnosis    Depression    Carpal tunnel syndrome    Depression with anxiety    Cubital tunnel syndrome    ADHD, predominantly inattentive type    Bipolar 2 disorder ([address]    Moderate single current episode of major depressive disorder ([address]    Choledocholithiasis    Intra-abdominal abscess ([address]    Sepsis ([address]    Neutrophilia    Fever and chills    Morbid obesity with BMI of 50.0-59.9, adult ([address]    History of laparoscopic cholecystectomy    Enterococcal infection    Severe sepsis ([address]    Septicemia ([address]    Elevated erythrocyte sedimentation rate    CRP elevated    Peripherally inserted central catheter (PICC) in place     Past Medical History:   Diagnosis Date    Acid reflux     mild    Anxiety and depression     Bipolar 1 disorder ([address]     Hiatal hernia     Migraine     Obesity     Thyroid disease     Wears glasses      Past Surgical History:   Procedure Laterality Date    CARPAL TUNNEL RELEASE Right 2014    CARPAL TUNNEL RELEASE Left 2016     SECTION   and     CHOLECYSTECTOMY, LAPAROSCOPIC N/A 2023    LAPAROSCOPIC CHOLECYSTECTOMY WITH CHOLANGIOGRAM performed by Kiya Preston MD at [address] [place] [address] Right 2023    10F.  Dr. Ad Nicole, COLON, DIAGNOSTIC      ERCP N/A 2023    ERCP SPHINCTEROTOMY performed by Elizabeth Howe MD at [address] [facility] N/A 2023    ERCP BALLOON SWEEP W/ STONE REMOVAL performed by Elizabeth Howe MD at [address] [facility] ERCP N/A 2023    ERCP

## 2023-05-03 NOTE — PROGRESS NOTES
Pt to PACU from Endo, Pt is alert to speech and denies pain at this time, vital signs stable on room air.

## 2023-05-03 NOTE — ANESTHESIA POSTPROCEDURE EVALUATION
Department of Anesthesiology  Postprocedure Note    Patient: Geri Dai  MRN: [id number]  YOB: 1985  Date of evaluation: 5/3/2023      Procedure Summary     Date: 05/03/23 Room / Location: [address] [place] [address]    Anesthesia Start: [address] Anesthesia Stop: 5249    Procedures:       ERCP STENT REMOVAL/EXCHANGE      ERCP STONE REMOVAL WITH BALLOON SWEEP  Diagnosis:       Calculus of bile duct without cholecystitis and without obstruction      (BILE DUCT STONE)    Surgeons: Carlita Hernandes MD Responsible Provider: Dee Cornelius MD    Anesthesia Type: general ASA Status: 3          Anesthesia Type: No value filed.     Kenzie Phase I: Kenzie Score: 10    Kenzie Phase II: Kenzie Score: 10      Anesthesia Post Evaluation    Patient location during evaluation: PACU  Patient participation: complete - patient participated  Level of consciousness: awake and alert  Pain score: 0  Airway patency: patent  Nausea & Vomiting: no nausea and no vomiting  Complications: no  Cardiovascular status: blood pressure returned to baseline  Respiratory status: acceptable  Hydration status: euvolemic

## 2023-05-03 NOTE — PROGRESS NOTES
Verbal and written discharge instructions were given to the patient and family, patient and family verbalize understanding of discharge instructions including medications orders for home and possible side effects associated with them. Patient instructed and verbalizes understanding to call the doctor listed if they should have any complications or worsening symptoms. Verbalizes understanding about follow-up appointments. Patient discharged home with all belongings. Out via wheelchair. Pt with continuous IV antibiotics infusing to L Upper arm PICC.

## 2023-05-03 NOTE — PROGRESS NOTES
Pt Is alert and oriented and denies pain at this time, vital signs stable on room air.  Will transfer to phase 2

## 2023-05-03 NOTE — DISCHARGE INSTRUCTIONS
Impression:  1. Bile leak off the cystic stump. As a result, current stent was removed and a new 10Fr 9cm plastic biliary stent was placed with upstream aspect in the common hepatic duct upstream from the takeoff of the cystic stump. Recommendations:  1. Clear diet. If does well overnight can advance diet as tolerated tomorrow. 2.  Dr. Jameel Barahona came by and given the ongoing leak has decided to leave drains in place and arrange for office follow-up in the next couple weeks to see if the drains can be removed at that time. 3.  Given ongoing leak despite biliary stent, will keep current stent in for 6 weeks this time and re-evaluate with ERCP in 6 weeks. Nilsa Collins Knows  5/2/2023    Discharge Instructions for ERCP    Endoscopic retrograde cholangiopancreatography (ERCP) allows the physician to enter the bile duct and the pancreatic duct with a probe. Contrast dye can then be injected into these ducts allowing the physician to evaluate the duct on x-ray imaging. If a stone is identified, this can be removed by the endoscopist.  If a stricture is identified, biopsies can be obtained and a stent can be placed. The primary complication of ERCP is pancreatitis. This occurs because the bile duct and the pancreatic duct empty through the same opening. Entering this opening can lead to irritation of the pancreas which contains digestive enzymes. If these enzymes are released in the pancreas, this can lead to an inflammatory response of the pancreas called pancreatitis. Usually, pancreatitis resolves in 3-4 days. Treatment is with pancreatic rest (nothing to eat) and intravenous fluids. Rarely pancreatitis can be more severe and last months to years or require surgery. Other risks with ERCP include perforation (which might require surgery) and bleeding (which could require blood transfusion ).      What You Will Need:  Someone to drive you home after the procedure     Steps to

## 2023-05-08 ENCOUNTER — HOSPITAL ENCOUNTER (OUTPATIENT)
Dept: INFUSION THERAPY | Age: 38
Setting detail: INFUSION SERIES
Discharge: HOME | End: 2023-05-08
Payer: MEDICAID

## 2023-05-08 VITALS — TEMPERATURE: 98.6 F | RESPIRATION RATE: 16 BRPM | DIASTOLIC BLOOD PRESSURE: 78 MMHG | SYSTOLIC BLOOD PRESSURE: 127 MMHG

## 2023-05-08 DIAGNOSIS — R65.20 SEVERE SEPSIS (HCC): Primary | ICD-10-CM

## 2023-05-08 DIAGNOSIS — A41.9 SEVERE SEPSIS (HCC): Primary | ICD-10-CM

## 2023-05-08 LAB
ALBUMIN SERPL-MCNC: 3.2 G/DL (ref 3.4–5)
ALBUMIN/GLOB SERPL: 0.8 {RATIO} (ref 1.1–2.2)
ALP SERPL-CCNC: 121 U/L (ref 40–129)
ALT SERPL-CCNC: 9 U/L (ref 10–40)
ANION GAP SERPL CALCULATED.3IONS-SCNC: 10 MMOL/L (ref 3–16)
AST SERPL-CCNC: 16 U/L (ref 15–37)
BASOPHILS # BLD: 0 K/UL (ref 0–0.2)
BASOPHILS NFR BLD: 0.5 %
BILIRUB SERPL-MCNC: <0.2 MG/DL (ref 0–1)
BUN SERPL-MCNC: 10 MG/DL (ref 7–20)
CALCIUM SERPL-MCNC: 9.3 MG/DL (ref 8.3–10.6)
CHLORIDE SERPL-SCNC: 106 MMOL/L (ref 99–110)
CO2 SERPL-SCNC: 26 MMOL/L (ref 21–32)
CREAT SERPL-MCNC: 0.8 MG/DL (ref 0.6–1.1)
CRP SERPL-MCNC: 17.8 MG/L (ref 0–5.1)
DEPRECATED RDW RBC AUTO: 16.7 % (ref 12.4–15.4)
EOSINOPHIL # BLD: 0.2 K/UL (ref 0–0.6)
EOSINOPHIL NFR BLD: 2.9 %
ERYTHROCYTE [SEDIMENTATION RATE] IN BLOOD BY WESTERGREN METHOD: 80 MM/HR (ref 0–20)
FUNGUS SPEC CULT: NORMAL
GFR SERPLBLD CREATININE-BSD FMLA CKD-EPI: >60 ML/MIN/{1.73_M2}
GLUCOSE SERPL-MCNC: 105 MG/DL (ref 70–99)
HCT VFR BLD AUTO: 28.8 % (ref 36–48)
HGB BLD-MCNC: 9.1 G/DL (ref 12–16)
LOEFFLER MB STN SPEC: NORMAL
LYMPHOCYTES # BLD: 2.2 K/UL (ref 1–5.1)
LYMPHOCYTES NFR BLD: 28.1 %
MCH RBC QN AUTO: 28.6 PG (ref 26–34)
MCHC RBC AUTO-ENTMCNC: 31.5 G/DL (ref 31–36)
MCV RBC AUTO: 90.9 FL (ref 80–100)
MONOCYTES # BLD: 0.4 K/UL (ref 0–1.3)
MONOCYTES NFR BLD: 4.8 %
NEUTROPHILS # BLD: 5 K/UL (ref 1.7–7.7)
NEUTROPHILS NFR BLD: 63.7 %
PLATELET # BLD AUTO: 507 K/UL (ref 135–450)
PMV BLD AUTO: 6.2 FL (ref 5–10.5)
POTASSIUM SERPL-SCNC: 4.1 MMOL/L (ref 3.5–5.1)
PROT SERPL-MCNC: 7.4 G/DL (ref 6.4–8.2)
RBC # BLD AUTO: 3.17 M/UL (ref 4–5.2)
SODIUM SERPL-SCNC: 142 MMOL/L (ref 136–145)
WBC # BLD AUTO: 7.8 K/UL (ref 4–11)

## 2023-05-08 PROCEDURE — 36592 COLLECT BLOOD FROM PICC: CPT

## 2023-05-08 PROCEDURE — 2580000003 HC RX 258: Performed by: INTERNAL MEDICINE

## 2023-05-08 PROCEDURE — 80053 COMPREHEN METABOLIC PANEL: CPT

## 2023-05-08 PROCEDURE — 85652 RBC SED RATE AUTOMATED: CPT

## 2023-05-08 PROCEDURE — 85025 COMPLETE CBC W/AUTO DIFF WBC: CPT

## 2023-05-08 PROCEDURE — 86140 C-REACTIVE PROTEIN: CPT

## 2023-05-08 PROCEDURE — 99212 OFFICE O/P EST SF 10 MIN: CPT

## 2023-05-08 RX ORDER — SODIUM CHLORIDE 0.9 % (FLUSH) 0.9 %
5-40 SYRINGE (ML) INJECTION PRN
Status: CANCELLED | OUTPATIENT
Start: 2023-05-15

## 2023-05-08 RX ORDER — SODIUM CHLORIDE 0.9 % (FLUSH) 0.9 %
5-40 SYRINGE (ML) INJECTION PRN
Status: DISCONTINUED | OUTPATIENT
Start: 2023-05-08 | End: 2023-05-09 | Stop reason: HOSPADM

## 2023-05-08 RX ADMIN — Medication 20 ML: at 13:30

## 2023-05-08 ASSESSMENT — PAIN DESCRIPTION - PAIN TYPE: TYPE: SURGICAL PAIN

## 2023-05-08 ASSESSMENT — PAIN DESCRIPTION - LOCATION: LOCATION: ABDOMEN

## 2023-05-08 ASSESSMENT — PAIN SCALES - GENERAL: PAINLEVEL_OUTOF10: 3

## 2023-05-08 NOTE — PROGRESS NOTES
Clinic Level of Care Assessment  Infusion Center      NAME:  Eddie Means OF BIRTH:  1985 GENDER: female  MEDICAL RECORD NUMBER:  [id number]   DATE:  5/8/2023     Ambulation Status Document in Daily Care/Safety Tab   Status Definition Points   Independent Independently able to ambulate. Fully able (without any assistance) to get on/off exam table/chair. [x]   0   Minimal Physical Assistance Requires physical assistance of one person to ambulate and/or position patient to be examined. Includes necessary physical assistance to position lower extremities on/off stool. []   1   Moderate Physical Assistance Requires at least one staff member to physically assist patient in ambulating into treatment room, and/or on off chair/bed. Requires assistance to bathroom. []   2   Full Assistance Requires assistance of at least two staff members to transfer patient into treatment room and/or on/off bed/chair. \"Total Transfer\". Unable to use bathroom requires bedside commode and/or bedpan []   3       Dressing Complexity Document in LDA Navigator  Complexity Definition Points   No Dressing  []   0   Simple Minimal, simple dressing. i.e. bandaid, gauze, simple wrap. Peripheral IV dressing. []   1   Intermediate Moderately complicated PICC dressing change requiring sterile procedure and site assessment. [x]   2   Complex Complicated dressing change port access requiring sterile procedure and site assessment. []   3       Teaching Effort Document in AVS and/or Education Navigator    Effort Definition Points   No Teaching  []   0   Simple Teach two or less topics. Teaching documented in discharge instructions in AVS and copy given to patient. [x]   1   Intermediate Teach three to four topics. Teaching documented in Discharge Instructions in AVS using References and Attachments. Copy given to patient. []   2   Complex Teach five to six topics.  Teaching documented in Discharge Instructions in AVS using

## 2023-05-08 NOTE — PROGRESS NOTES
Outpatient [facility]    PICC Dressing Change    NAME:  Carlota Rodriguez  YOB: 1985  MEDICAL RECORD NUMBER:  [id number]  DATE:  5/8/2023    Patient arrived to [facility]   [] per wheelchair   [x] ambulatory     PICC Location:left arm    PICC Site:  Redness: No  Bruising: No   Edema: No  Pain: No     PICC Cleansed:  Current dressing and stat lock removed: Yes  Chloroprep Scrub for 30 seconds and air dried completely for 1 minute: Yes     PICC Dressing Change  Skin barrier: Yes  Stat lock applied and PICC line secured Yes  Biopatch applied: Yes   PICC site covered with Tegaderm Yes  Date and initials applied to PICC dressing Yes  [] Other:    Next Dressing Due: May 15, 2023. Response to treatment:  Well tolerated by patient.       Electronically signed by Massimo Rhoades RN on 5/8/2023 at 1:46 PM

## 2023-05-08 NOTE — PROGRESS NOTES
[facility]    PICC Lab Draw    NAME:  Abdi Scales  YOB: 1985  MEDICAL RECORD NUMBER:  [id number]  DATE:  5/8/2023    Patient arrived to [facility]   [] per wheelchair   [x] ambulatory     PICC Location:left arm    PICC Site:  Redness: No  Bruising: No   Edema: No  Pain: No     PICC Labs  Cap cleansed with Chloroprep Scrub for 30 seconds and air dried completely for 1 minute on sterile 4X4: Yes  Blood drawn using a 10/ml syringe  Amount of blood wasted 10/ml syringe    Labs Obtained: Yes  Lab Test(s) Ordered: CMP CBC ESR CRP  PICC Flushed with 20ml/NS: Yes  New Cap applied: Yes     Response to treatment:  Well tolerated by patient.       Electronically signed by Bethany Guy RN on 5/8/2023 at 1:44 PM

## 2023-05-10 ENCOUNTER — TELEPHONE (OUTPATIENT)
Dept: INFECTIOUS DISEASES | Age: 38
End: 2023-05-10

## 2023-05-10 ENCOUNTER — OFFICE VISIT (OUTPATIENT)
Dept: INFECTIOUS DISEASES | Age: 38
End: 2023-05-10
Payer: MEDICAID

## 2023-05-10 VITALS
WEIGHT: 260.4 LBS | HEART RATE: 88 BPM | TEMPERATURE: 97.7 F | SYSTOLIC BLOOD PRESSURE: 131 MMHG | BODY MASS INDEX: 47.92 KG/M2 | HEIGHT: 62 IN | OXYGEN SATURATION: 97 % | DIASTOLIC BLOOD PRESSURE: 83 MMHG

## 2023-05-10 DIAGNOSIS — E66.01 MORBID OBESITY WITH BMI OF 50.0-59.9, ADULT (HCC): ICD-10-CM

## 2023-05-10 DIAGNOSIS — R70.0 ELEVATED ERYTHROCYTE SEDIMENTATION RATE: ICD-10-CM

## 2023-05-10 DIAGNOSIS — K80.50 CHOLEDOCHOLITHIASIS: ICD-10-CM

## 2023-05-10 DIAGNOSIS — D72.9 NEUTROPHILIA: ICD-10-CM

## 2023-05-10 DIAGNOSIS — Z79.2 RECEIVING INTRAVENOUS ANTIBIOTIC TREATMENT AS OUTPATIENT: ICD-10-CM

## 2023-05-10 DIAGNOSIS — K65.1 INTRA-ABDOMINAL ABSCESS (HCC): Primary | ICD-10-CM

## 2023-05-10 DIAGNOSIS — R79.82 CRP ELEVATED: ICD-10-CM

## 2023-05-10 DIAGNOSIS — Z45.2 PERIPHERALLY INSERTED CENTRAL CATHETER (PICC) IN PLACE: ICD-10-CM

## 2023-05-10 PROCEDURE — G8417 CALC BMI ABV UP PARAM F/U: HCPCS | Performed by: INTERNAL MEDICINE

## 2023-05-10 PROCEDURE — 1036F TOBACCO NON-USER: CPT | Performed by: INTERNAL MEDICINE

## 2023-05-10 PROCEDURE — G8427 DOCREV CUR MEDS BY ELIG CLIN: HCPCS | Performed by: INTERNAL MEDICINE

## 2023-05-10 PROCEDURE — 1111F DSCHRG MED/CURRENT MED MERGE: CPT | Performed by: INTERNAL MEDICINE

## 2023-05-10 PROCEDURE — 99215 OFFICE O/P EST HI 40 MIN: CPT | Performed by: INTERNAL MEDICINE

## 2023-05-10 NOTE — TELEPHONE ENCOUNTER
Spoke with Shlomo [facility] pharmacist at [facility], verbalized understanding to extend IV ABX until 5/22/23.

## 2023-05-15 ENCOUNTER — HOSPITAL ENCOUNTER (OUTPATIENT)
Dept: INFUSION THERAPY | Age: 38
Setting detail: INFUSION SERIES
Discharge: HOME | End: 2023-05-15
Payer: MEDICAID

## 2023-05-15 ENCOUNTER — OFFICE VISIT (OUTPATIENT)
Dept: SURGERY | Age: 38
End: 2023-05-15

## 2023-05-15 VITALS
DIASTOLIC BLOOD PRESSURE: 70 MMHG | OXYGEN SATURATION: 97 % | SYSTOLIC BLOOD PRESSURE: 108 MMHG | HEART RATE: 89 BPM | RESPIRATION RATE: 18 BRPM | TEMPERATURE: 98.7 F

## 2023-05-15 VITALS — BODY MASS INDEX: 47.55 KG/M2 | WEIGHT: 260 LBS

## 2023-05-15 DIAGNOSIS — A41.9 SEVERE SEPSIS (HCC): Primary | ICD-10-CM

## 2023-05-15 DIAGNOSIS — K65.1 INTRA-ABDOMINAL ABSCESS (HCC): Primary | ICD-10-CM

## 2023-05-15 DIAGNOSIS — R65.20 SEVERE SEPSIS (HCC): Primary | ICD-10-CM

## 2023-05-15 LAB
ALBUMIN SERPL-MCNC: 3.7 G/DL (ref 3.4–5)
ALBUMIN/GLOB SERPL: 0.9 {RATIO} (ref 1.1–2.2)
ALP SERPL-CCNC: 216 U/L (ref 40–129)
ALT SERPL-CCNC: 32 U/L (ref 10–40)
ANION GAP SERPL CALCULATED.3IONS-SCNC: 11 MMOL/L (ref 3–16)
AST SERPL-CCNC: 38 U/L (ref 15–37)
BASOPHILS # BLD: 0.1 K/UL (ref 0–0.2)
BASOPHILS NFR BLD: 1.3 %
BILIRUB SERPL-MCNC: 0.3 MG/DL (ref 0–1)
BUN SERPL-MCNC: 7 MG/DL (ref 7–20)
CALCIUM SERPL-MCNC: 8.7 MG/DL (ref 8.3–10.6)
CHLORIDE SERPL-SCNC: 103 MMOL/L (ref 99–110)
CO2 SERPL-SCNC: 26 MMOL/L (ref 21–32)
CREAT SERPL-MCNC: 0.8 MG/DL (ref 0.6–1.1)
CRP SERPL-MCNC: 91.4 MG/L (ref 0–5.1)
DEPRECATED RDW RBC AUTO: 16.7 % (ref 12.4–15.4)
EOSINOPHIL # BLD: 0.3 K/UL (ref 0–0.6)
EOSINOPHIL NFR BLD: 4.1 %
ERYTHROCYTE [SEDIMENTATION RATE] IN BLOOD BY WESTERGREN METHOD: 96 MM/HR (ref 0–20)
FUNGUS SPEC CULT: NORMAL
GFR SERPLBLD CREATININE-BSD FMLA CKD-EPI: >60 ML/MIN/{1.73_M2}
GLUCOSE SERPL-MCNC: 167 MG/DL (ref 70–99)
HCT VFR BLD AUTO: 30 % (ref 36–48)
HGB BLD-MCNC: 9.7 G/DL (ref 12–16)
LOEFFLER MB STN SPEC: NORMAL
LYMPHOCYTES # BLD: 1.4 K/UL (ref 1–5.1)
LYMPHOCYTES NFR BLD: 17.3 %
MCH RBC QN AUTO: 29.8 PG (ref 26–34)
MCHC RBC AUTO-ENTMCNC: 32.3 G/DL (ref 31–36)
MCV RBC AUTO: 92.3 FL (ref 80–100)
MONOCYTES # BLD: 0.5 K/UL (ref 0–1.3)
MONOCYTES NFR BLD: 6 %
NEUTROPHILS # BLD: 5.7 K/UL (ref 1.7–7.7)
NEUTROPHILS NFR BLD: 71.3 %
PLATELET # BLD AUTO: 436 K/UL (ref 135–450)
PMV BLD AUTO: 6.6 FL (ref 5–10.5)
POTASSIUM SERPL-SCNC: 3.9 MMOL/L (ref 3.5–5.1)
PROT SERPL-MCNC: 7.9 G/DL (ref 6.4–8.2)
RBC # BLD AUTO: 3.25 M/UL (ref 4–5.2)
SODIUM SERPL-SCNC: 140 MMOL/L (ref 136–145)
WBC # BLD AUTO: 8 K/UL (ref 4–11)

## 2023-05-15 PROCEDURE — 86140 C-REACTIVE PROTEIN: CPT

## 2023-05-15 PROCEDURE — 85652 RBC SED RATE AUTOMATED: CPT

## 2023-05-15 PROCEDURE — 36592 COLLECT BLOOD FROM PICC: CPT

## 2023-05-15 PROCEDURE — 80053 COMPREHEN METABOLIC PANEL: CPT

## 2023-05-15 PROCEDURE — 99212 OFFICE O/P EST SF 10 MIN: CPT

## 2023-05-15 PROCEDURE — 99024 POSTOP FOLLOW-UP VISIT: CPT | Performed by: SURGERY

## 2023-05-15 PROCEDURE — 85025 COMPLETE CBC W/AUTO DIFF WBC: CPT

## 2023-05-15 RX ORDER — SODIUM CHLORIDE 0.9 % (FLUSH) 0.9 %
5-40 SYRINGE (ML) INJECTION PRN
Status: DISCONTINUED | OUTPATIENT
Start: 2023-05-15 | End: 2023-05-16 | Stop reason: HOSPADM

## 2023-05-15 RX ORDER — SODIUM CHLORIDE 0.9 % (FLUSH) 0.9 %
5-40 SYRINGE (ML) INJECTION PRN
OUTPATIENT
Start: 2023-05-22

## 2023-05-15 NOTE — PROGRESS NOTES
Outpatient [facility] Lab Draw    NAME:  Alejandro Howell  YOB: 1985  MEDICAL RECORD NUMBER:  [id number]  DATE:  5/15/2023    Patient arrived to [place] [address]   [] per wheelchair   [x] ambulatory     Patient with history af intra abdominal abcess and sepsis. Currently on 24 hour IV zosyn per PICC Line. Has a couple af abdominal drains but states not any output lately and she has an appt with Dr. Calvin Watters today for surgical follow up. May get drains pulled today. Patient states she has been more tired lately and having periods of chilling and feeling cold but denies any fever. Stools are loose but eats yogurt every day. Still having sharp burning pain in right abdomen where drains are located. PICC Location:left arm    PICC Site:  Redness: No  Bruising: No   Edema: No  Pain: No     PICC Labs  Cap cleansed with Chloroprep Scrub for 30 seconds and air dried completely for 1 minute on sterile 4X4: Yes  Blood drawn using a 10/ml syringe  Amount of blood wasted 10/ml syringe    Labs Obtained: Yes  Lab Test(s) Ordered: CBC with diff, CMP, ESR, CRP  PICC Flushed with 30ml/NS: Yes  New Cap applied: Yes     Response to treatment:  Well tolerated by patient. Patient going to appt now with Dr. Calvin Watters.       Electronically signed by Amadeo Paz RN on 5/15/2023 at 5:12 [address]    PICC Dressing Change    NAME:  Juan Antonio 15:  1985  MEDICAL RECORD NUMBER:  [id number]  DATE:  5/15/2023    Patient arrived to [place] [address]   [] per wheelchair   [x] ambulatory     PICC Location:left arm    PICC Site:  Redness: No  Bruising: No   Edema: No  Pain: No     PICC Cleansed:  Current dressing and stat lock removed: Yes  Chloroprep Scrub for 30 seconds and air dried completely for 1 minute: Yes     PICC Dressing Change  Skin barrier: Yes  Stat lock applied and PICC line

## 2023-05-15 NOTE — DISCHARGE INSTRUCTIONS
Outpatient Infusion Discharge Instructions  [facility]  [address] [facility] [address] [address], [address]  Telephone: [phone]    NAME:  Griselda Pleasure OF BIRTH:  1985  MEDICAL RECORD NUMBER:  [id number]  DATE:  @ED@    Reason for Outpatient Infusion Visit:  PICC labs drawn and PICC drsg changed    If you develop any these symptoms please contact you Doctor    [x] Nausea and/or vomiting not relieved with medication   [x] Swelling, redness, and/or bleeding at injection or IV site    [x] Fever or chills  [x] Rash or itching   [x] Shortness of breath  [] Please review After Visit Summary (AVS) information on    [] Other      Outpatient [address]: Should you experience any significant changes in your health or have questions about your care please contact the [address] at [address] 8:00 am - 4:00 pm.  If you need help outside these hours and cannot wait until we are again available, contact your Primary Care Physician or go to the hospital emergency room.        Electronically signed by Catherine Hector RN on 5/15/2023 at 1:36 PM

## 2023-05-15 NOTE — PROGRESS NOTES
Toño Youssef (:  1985) is a 44 y.o. female,Established patient, here for evaluation of the following chief complaint(s):  Follow-up (Drain check. Pt states she has not have anything in the drain for one week, is hoping it gets removed today. )         ASSESSMENT/PLAN:  1. Intra-abdominal abscess ([address]  -     CT ABDOMEN W CONTRAST; Future    Follow up after CT    May need drain replaced         Subjective   SUBJECTIVE/OBJECTIVE:  HPI  Still with pain and fatigue but improving overall. Drains have stopped so were removed today. Medial drain site had ongoing purulent output so drain must have occluded. Follow up ERCP showed ongoing bile leak. Plan for ERCP in two weeks. Will check CT to see if a new drain is needed. Review of Systems       Objective   Physical Exam       Electronically signed by Madi Polk MD on 5/15/2023 at 4:33 PM        An electronic signature was used to authenticate this note.     --Madi Polk MD

## 2023-05-16 ENCOUNTER — HOSPITAL ENCOUNTER (OUTPATIENT)
Dept: CT IMAGING | Age: 38
Discharge: HOME | End: 2023-05-16
Payer: MEDICAID

## 2023-05-16 DIAGNOSIS — K65.1 INTRA-ABDOMINAL ABSCESS (HCC): ICD-10-CM

## 2023-05-16 PROCEDURE — 74160 CT ABDOMEN W/CONTRAST: CPT

## 2023-05-16 PROCEDURE — 6360000004 HC RX CONTRAST MEDICATION: Performed by: SURGERY

## 2023-05-16 RX ADMIN — IOPAMIDOL 50 ML: 612 INJECTION, SOLUTION INTRAVENOUS at 10:00

## 2023-05-16 RX ADMIN — IOMEPROL INJECTION 100 ML: 714 INJECTION, SOLUTION INTRAVASCULAR at 11:01

## 2023-05-18 ENCOUNTER — TELEPHONE (OUTPATIENT)
Dept: SURGERY | Age: 38
End: 2023-05-18

## 2023-05-22 ENCOUNTER — OFFICE VISIT (OUTPATIENT)
Dept: SURGERY | Age: 38
End: 2023-05-22

## 2023-05-22 ENCOUNTER — TELEPHONE (OUTPATIENT)
Dept: INFECTIOUS DISEASES | Age: 38
End: 2023-05-22

## 2023-05-22 ENCOUNTER — HOSPITAL ENCOUNTER (OUTPATIENT)
Dept: INFUSION THERAPY | Age: 38
Setting detail: INFUSION SERIES
Discharge: HOME | End: 2023-05-22
Payer: MEDICAID

## 2023-05-22 VITALS
HEART RATE: 69 BPM | SYSTOLIC BLOOD PRESSURE: 102 MMHG | RESPIRATION RATE: 16 BRPM | DIASTOLIC BLOOD PRESSURE: 72 MMHG | TEMPERATURE: 98.7 F

## 2023-05-22 DIAGNOSIS — K65.1 INTRA-ABDOMINAL ABSCESS (HCC): Primary | ICD-10-CM

## 2023-05-22 DIAGNOSIS — R65.20 SEVERE SEPSIS (HCC): Primary | ICD-10-CM

## 2023-05-22 DIAGNOSIS — A41.9 SEVERE SEPSIS (HCC): Primary | ICD-10-CM

## 2023-05-22 LAB
ALBUMIN SERPL-MCNC: 3.7 G/DL (ref 3.4–5)
ALBUMIN/GLOB SERPL: 1 {RATIO} (ref 1.1–2.2)
ALP SERPL-CCNC: 153 U/L (ref 40–129)
ALT SERPL-CCNC: 21 U/L (ref 10–40)
ANION GAP SERPL CALCULATED.3IONS-SCNC: 11 MMOL/L (ref 3–16)
AST SERPL-CCNC: 29 U/L (ref 15–37)
BASOPHILS # BLD: 0.1 K/UL (ref 0–0.2)
BASOPHILS NFR BLD: 1.1 %
BILIRUB SERPL-MCNC: 0.3 MG/DL (ref 0–1)
BUN SERPL-MCNC: 10 MG/DL (ref 7–20)
CALCIUM SERPL-MCNC: 8.5 MG/DL (ref 8.3–10.6)
CHLORIDE SERPL-SCNC: 104 MMOL/L (ref 99–110)
CO2 SERPL-SCNC: 25 MMOL/L (ref 21–32)
CREAT SERPL-MCNC: 0.9 MG/DL (ref 0.6–1.1)
CRP SERPL-MCNC: 9.4 MG/L (ref 0–5.1)
DEPRECATED RDW RBC AUTO: 16.8 % (ref 12.4–15.4)
EOSINOPHIL # BLD: 0.3 K/UL (ref 0–0.6)
EOSINOPHIL NFR BLD: 4.1 %
ERYTHROCYTE [SEDIMENTATION RATE] IN BLOOD BY WESTERGREN METHOD: 104 MM/HR (ref 0–20)
FUNGUS SPEC CULT: NORMAL
GFR SERPLBLD CREATININE-BSD FMLA CKD-EPI: >60 ML/MIN/{1.73_M2}
GLUCOSE SERPL-MCNC: 140 MG/DL (ref 70–99)
HCT VFR BLD AUTO: 34.1 % (ref 36–48)
HGB BLD-MCNC: 11.1 G/DL (ref 12–16)
LOEFFLER MB STN SPEC: NORMAL
LYMPHOCYTES # BLD: 1.8 K/UL (ref 1–5.1)
LYMPHOCYTES NFR BLD: 27.5 %
MCH RBC QN AUTO: 29.2 PG (ref 26–34)
MCHC RBC AUTO-ENTMCNC: 32.6 G/DL (ref 31–36)
MCV RBC AUTO: 89.6 FL (ref 80–100)
MONOCYTES # BLD: 0.3 K/UL (ref 0–1.3)
MONOCYTES NFR BLD: 4.8 %
NEUTROPHILS # BLD: 4.2 K/UL (ref 1.7–7.7)
NEUTROPHILS NFR BLD: 62.5 %
PLATELET # BLD AUTO: 510 K/UL (ref 135–450)
PMV BLD AUTO: 6.6 FL (ref 5–10.5)
POTASSIUM SERPL-SCNC: 4.3 MMOL/L (ref 3.5–5.1)
PROT SERPL-MCNC: 7.3 G/DL (ref 6.4–8.2)
RBC # BLD AUTO: 3.8 M/UL (ref 4–5.2)
SODIUM SERPL-SCNC: 140 MMOL/L (ref 136–145)
WBC # BLD AUTO: 6.7 K/UL (ref 4–11)

## 2023-05-22 PROCEDURE — 36592 COLLECT BLOOD FROM PICC: CPT

## 2023-05-22 PROCEDURE — 99212 OFFICE O/P EST SF 10 MIN: CPT

## 2023-05-22 PROCEDURE — 80053 COMPREHEN METABOLIC PANEL: CPT

## 2023-05-22 PROCEDURE — 85652 RBC SED RATE AUTOMATED: CPT

## 2023-05-22 PROCEDURE — 99024 POSTOP FOLLOW-UP VISIT: CPT | Performed by: SURGERY

## 2023-05-22 PROCEDURE — 86140 C-REACTIVE PROTEIN: CPT

## 2023-05-22 PROCEDURE — 85025 COMPLETE CBC W/AUTO DIFF WBC: CPT

## 2023-05-22 RX ORDER — SODIUM CHLORIDE 0.9 % (FLUSH) 0.9 %
5-40 SYRINGE (ML) INJECTION PRN
Status: CANCELLED | OUTPATIENT
Start: 2023-05-29

## 2023-05-22 NOTE — PROGRESS NOTES
Outpatient [facility] [place] [facility]"" 1978    PICC Dressing Change    NAME:  Sherren Boone  YOB: 1985  MEDICAL RECORD NUMBER:  [id number]  DATE:  5/22/2023    Patient arrived to [place] [address]   [] per wheelchair   [x] ambulatory     PICC Location:left arm    PICC Site:  Redness: No  Bruising: No   Edema: No  Pain: No     Pt had to reinforce dressing started coming off. Currently PICC out 8 cm assured pt that there is still lenty of PICC in [address] Lab Draw    NAME:  Cecille Valadez OF BIRTH:  1985  MEDICAL RECORD NUMBER:  [id number]  DATE:  5/22/2023    Patient arrived to [place] [address]   [] per wheelchair   [x] ambulatory     PICC Location:left arm    PICC Site:  Redness: No  Bruising: No   Edema: No  Pain: No     PICC Labs  Cap cleansed with Chloroprep Scrub for 30 seconds and air dried completely for 1 minute on sterile 4X4: Yes  Blood drawn using a 10/ml syringe  Amount of blood wasted 20/ml syringe    Labs Obtained: Yes  Lab Test(s) Ordered: CBC,BMP,ESR,CRP  PICC Flushed with 20ml/NS: Yes  New Cap applied: Yes     Response to treatment:  Well tolerated by patient. Clinic Level of Care Assessment  Infusion Center      NAME:  Cecille Valadez OF BIRTH:  1985 GENDER: female  MEDICAL RECORD NUMBER:  [id number]   DATE:  5/22/2023     Ambulation Status Document in Daily Care/Safety Tab   Status Definition Points   Independent Independently able to ambulate. Fully able (without any assistance) to get on/off exam table/chair. [x]   0   Minimal Physical Assistance Requires physical assistance of one person to ambulate and/or position patient to be examined. Includes necessary physical assistance to position lower extremities on/off stool.  []   1   Moderate Physical Assistance Requires at least one staff member to physically assist patient in ambulating into treatment room,

## 2023-05-22 NOTE — TELEPHONE ENCOUNTER
Spoke with Sarah Jarquin pharmacist at [facility], verbalized understanding to extend IV ABX until 6/5/23. Left message for pt on her voicemail.

## 2023-05-22 NOTE — DISCHARGE INSTRUCTIONS
Outpatient Infusion Discharge Instructions  [address] [address] [place] [facility] 24  Telephone: [phone]    NAME:  Suellen Darden OF BIRTH:  1985  MEDICAL RECORD NUMBER:  [id number]  DATE:  @ED@    Reason for Outpatient Infusion Visit: ***    If you develop any these symptoms please contact you Doctor    [] Nausea and/or vomiting not relieved with medication   [] Swelling, redness, and/or bleeding at injection or IV site    [] Fever or chills  [] Rash or itching   [] Shortness of breath  [] Please review After Visit Summary (AVS) information on    [] Other      Outpatient [address]: Should you experience any significant changes in your health or have questions about your care please contact the [address] at [address] [facility] 8:00 am - 4:00 pm.  If you need help outside these hours and cannot wait until we are again available, contact your Primary Care Physician or go to the hospital emergency room.        Electronically signed by Lidia Lesches, RN on 5/22/2023 at 1:14 PM Outpatient Infusion Discharge Instructions  [address] [address] [place] [facility]  Telephone: [phone]    NAME:  Suellen Darden OF BIRTH:  1985  MEDICAL RECORD NUMBER:  [id number]  DATE:  [de-identified]    Reason for Outpatient Infusion Visit: ***    If you develop any these symptoms please contact you Doctor    [x] Nausea and/or vomiting not relieved with medication   [x] Swelling, redness, and/or bleeding at injection or IV site    [x] Fever or chills  [x] Rash or itching   [] Shortness of breath  [x] Please review After Visit Summary (AVS) information on    [] Other      Outpatient [address]: Should you experience any significant changes in your health or have questions about your care please contact the [facility]

## 2023-05-26 NOTE — PROGRESS NOTES
Yuki Scott (:  1985) is a 44 y.o. female,Established patient, here for evaluation of the following chief complaint(s):  Post-Op Check (Pt is here today for a postop visit. )         ASSESSMENT/PLAN:  1. Intra-abdominal abscess ([address]    Follow up ERCP as scheduled then with me         Subjective   SUBJECTIVE/OBJECTIVE:  HPI  Doing better overall. Less pain. Drains are out. Follow up CT with small residual collection. For ERCP next week. Review of Systems       Objective   Physical Exam       Electronically signed by Aj Skinner MD on 2023 at 11:24 AM        An electronic signature was used to authenticate this note.     --Aj Skinner MD

## 2023-05-30 ENCOUNTER — HOSPITAL ENCOUNTER (OUTPATIENT)
Dept: INFUSION THERAPY | Age: 38
Setting detail: INFUSION SERIES
Discharge: HOME | End: 2023-05-30
Payer: MEDICAID

## 2023-05-30 VITALS
TEMPERATURE: 99 F | HEART RATE: 89 BPM | SYSTOLIC BLOOD PRESSURE: 109 MMHG | RESPIRATION RATE: 16 BRPM | DIASTOLIC BLOOD PRESSURE: 76 MMHG

## 2023-05-30 DIAGNOSIS — R65.20 SEVERE SEPSIS (HCC): Primary | ICD-10-CM

## 2023-05-30 DIAGNOSIS — A41.9 SEVERE SEPSIS (HCC): Primary | ICD-10-CM

## 2023-05-30 LAB
ALBUMIN SERPL-MCNC: 3.3 G/DL (ref 3.4–5)
ALBUMIN/GLOB SERPL: 1 {RATIO} (ref 1.1–2.2)
ALP SERPL-CCNC: 143 U/L (ref 40–129)
ALT SERPL-CCNC: 15 U/L (ref 10–40)
ANION GAP SERPL CALCULATED.3IONS-SCNC: 11 MMOL/L (ref 3–16)
AST SERPL-CCNC: 24 U/L (ref 15–37)
BASOPHILS # BLD: 0.1 K/UL (ref 0–0.2)
BASOPHILS NFR BLD: 1 %
BILIRUB SERPL-MCNC: <0.2 MG/DL (ref 0–1)
BUN SERPL-MCNC: 9 MG/DL (ref 7–20)
CALCIUM SERPL-MCNC: 8.8 MG/DL (ref 8.3–10.6)
CHLORIDE SERPL-SCNC: 104 MMOL/L (ref 99–110)
CO2 SERPL-SCNC: 24 MMOL/L (ref 21–32)
CREAT SERPL-MCNC: 0.7 MG/DL (ref 0.6–1.1)
CRP SERPL-MCNC: 29.3 MG/L (ref 0–5.1)
DEPRECATED RDW RBC AUTO: 17.9 % (ref 12.4–15.4)
EOSINOPHIL # BLD: 0.3 K/UL (ref 0–0.6)
EOSINOPHIL NFR BLD: 4 %
ERYTHROCYTE [SEDIMENTATION RATE] IN BLOOD BY WESTERGREN METHOD: 73 MM/HR (ref 0–20)
GFR SERPLBLD CREATININE-BSD FMLA CKD-EPI: >60 ML/MIN/{1.73_M2}
GLUCOSE SERPL-MCNC: 154 MG/DL (ref 70–99)
HCT VFR BLD AUTO: 36.3 % (ref 36–48)
HGB BLD-MCNC: 11.8 G/DL (ref 12–16)
LYMPHOCYTES # BLD: 1.5 K/UL (ref 1–5.1)
LYMPHOCYTES NFR BLD: 22.1 %
MCH RBC QN AUTO: 29.6 PG (ref 26–34)
MCHC RBC AUTO-ENTMCNC: 32.5 G/DL (ref 31–36)
MCV RBC AUTO: 91.3 FL (ref 80–100)
MONOCYTES # BLD: 0.3 K/UL (ref 0–1.3)
MONOCYTES NFR BLD: 4.4 %
NEUTROPHILS # BLD: 4.7 K/UL (ref 1.7–7.7)
NEUTROPHILS NFR BLD: 68.5 %
PLATELET # BLD AUTO: 303 K/UL (ref 135–450)
PMV BLD AUTO: 7 FL (ref 5–10.5)
POTASSIUM SERPL-SCNC: 4 MMOL/L (ref 3.5–5.1)
PROT SERPL-MCNC: 6.7 G/DL (ref 6.4–8.2)
RBC # BLD AUTO: 3.97 M/UL (ref 4–5.2)
SODIUM SERPL-SCNC: 139 MMOL/L (ref 136–145)
WBC # BLD AUTO: 6.8 K/UL (ref 4–11)

## 2023-05-30 PROCEDURE — 85652 RBC SED RATE AUTOMATED: CPT

## 2023-05-30 PROCEDURE — 80053 COMPREHEN METABOLIC PANEL: CPT

## 2023-05-30 PROCEDURE — 36592 COLLECT BLOOD FROM PICC: CPT

## 2023-05-30 PROCEDURE — 85025 COMPLETE CBC W/AUTO DIFF WBC: CPT

## 2023-05-30 PROCEDURE — 86140 C-REACTIVE PROTEIN: CPT

## 2023-05-30 PROCEDURE — 99213 OFFICE O/P EST LOW 20 MIN: CPT

## 2023-05-30 RX ORDER — SODIUM CHLORIDE 0.9 % (FLUSH) 0.9 %
5-40 SYRINGE (ML) INJECTION PRN
Status: DISCONTINUED | OUTPATIENT
Start: 2023-05-30 | End: 2023-05-31 | Stop reason: HOSPADM

## 2023-05-30 RX ORDER — SODIUM CHLORIDE 0.9 % (FLUSH) 0.9 %
5-40 SYRINGE (ML) INJECTION PRN
Status: CANCELLED | OUTPATIENT
Start: 2023-06-05

## 2023-05-30 NOTE — DISCHARGE INSTRUCTIONS
Outpatient Infusion Discharge Instructions  [address] [address] [facility] 24  Telephone: [phone]    NAME:  Kailyn Will OF BIRTH:  1985  MEDICAL RECORD NUMBER:  [id number]  DATE:  @ED@    Reason for Outpatient Infusion Visit: ***    If you develop any these symptoms please contact you Doctor    [] Nausea and/or vomiting not relieved with medication   [] Swelling, redness, and/or bleeding at injection or IV site    [] Fever or chills  [] Rash or itching   [] Shortness of breath  [] Please review After Visit Summary (AVS) information on    [] Other      Outpatient [address]: Should you experience any significant changes in your health or have questions about your care please contact the [address] at [address] [facility] 8:00 am - 4:00 pm.  If you need help outside these hours and cannot wait until we are again available, contact your Primary Care Physician or go to the hospital emergency room.        Electronically signed by Kimberly Cifuentes RN on 5/30/2023 at 1:24 PM Outpatient Infusion Discharge Instructions  [facility]  [address] [facility] [address] [place] [facility]  Telephone: [phone]    NAME:  Kailyn Will OF BIRTH:  1985  MEDICAL RECORD NUMBER:  [id number]  DATE:  [de-identified]    Reason for Outpatient Infusion Visit: ***    If you develop any these symptoms please contact you Doctor    [] Nausea and/or vomiting not relieved with medication   [] Swelling, redness, and/or bleeding at injection or IV site    [] Fever or chills  [] Rash or itching   [] Shortness of breath  [] Please review After Visit Summary (AVS) information on    [] Other      Outpatient [address]: Should you experience any significant changes in your health or have questions about your care please contact the [facility] Infusion Subjective   Patient ID: Kelly is a 16 year old female who is accompanied by:mother     Chief Complaint   Patient presents with   • Illness     (L) ear pain, (R) hurts too but not as much, and she gets headaches       B earache L > R off and on for 1 month and pain has gotten worse  No cough, runny nose, nasal congestion, sore throat  Getting a HA from her earache on the L side of her face  Taking ibuprofen  Also taking Concerta 81 mg for ADHD  Doesn't usually get AOM      Review of Systems   Constitutional: Negative for activity change, appetite change, fatigue and fever.   HENT: Positive for ear pain. Negative for congestion, ear discharge and rhinorrhea.    Respiratory: Negative for cough.    Gastrointestinal: Negative for abdominal pain, constipation, diarrhea, nausea and vomiting.   Skin: Negative for rash.   Neurological: Positive for headaches. Negative for weakness.       Patient's medications, allergies, past medical, surgical, social and family histories were reviewed and updated as appropriate.    Objective   Vitals:/76   Pulse 86   Temp 98.6 °F (37 °C) (Temporal)   Wt 62 kg (136 lb 11 oz)   Physical Exam   Constitutional: She is oriented to person, place, and time. She appears well-developed and well-nourished.   HENT:   Head: Normocephalic.   Right Ear: External ear normal.   Left Ear: External ear normal.   Nose: Nose normal.   Mouth/Throat: Oropharynx is clear and moist.   Eyes: Conjunctivae are normal.   Neck: Normal range of motion. Neck supple.   Cardiovascular: Normal rate, regular rhythm and normal heart sounds.   Pulmonary/Chest: Effort normal and breath sounds normal.   Abdominal: Soft. Bowel sounds are normal.   Musculoskeletal: Normal range of motion.   Neurological: She is alert and oriented to person, place, and time.   Skin: Skin is warm. No rash noted.       Assessment   Problem List Items Addressed This Visit     None      Visit Diagnoses     Otalgia of both ears    -  Primary     Relevant Medications    amoxicillin (AMOXIL) 875 MG tablet    Acute sinusitis, recurrence not specified, unspecified location        Relevant Medications    amoxicillin (AMOXIL) 875 MG tablet        Observation, will treat with amoxicillin for potential sinusitis causing otalgia, tylenol or ibuprofen po prn pain, f/u if sxs persist

## 2023-05-30 NOTE — PROGRESS NOTES
Outpatient [facility]    PICC Dressing Change    NAME:  Richard West  YOB: 1985  MEDICAL RECORD NUMBER:  [id number]  DATE:  5/30/2023    Patient arrived to [place] [address]   [] per wheelchair   [x] ambulatory     PICC Location:left arm    PICC Site:  Redness: No  Bruising: No   Edema: No  Pain: No     PICC Cleansed:  Current dressing and stat lock removed: Yes  Chloroprep Scrub for 30 seconds and air dried completely for 1 minute: Yes     PICC Dressing Change  Skin barrier: Yes  Stat lock applied and PICC line secured Yes  Biopatch applied: Yes   PICC site covered with Tegaderm Yes  Date and initials applied to PICC dressing Yes  [] Other:    Next Dressing Due: June 6, 2023. Response to treatment:  Well tolerated by patient. Outpatient [facility] 1978    PICC Lab Draw    NAME:  Richard West  YOB: 1985  MEDICAL RECORD NUMBER:  [id number]  DATE:  5/30/2023    Patient arrived to [place] [address]   [] per wheelchair   [x] ambulatory     PICC Location:left arm    PICC Site:  Redness: No  Bruising: No   Edema: No  Pain: No     PICC Labs  Cap cleansed with Chloroprep Scrub for 30 seconds and air dried completely for 1 minute on sterile 4X4: Yes  Blood drawn using a 10/ml syringe  Amount of blood wasted 20/ml syringe    Labs Obtained: Yes  Lab Test(s) Ordered: cbc.cmp,esr,crp  PICC Flushed with 20ml/NS: Yes  New Cap applied: Yes     Response to treatment:  Well tolerated by patient. Clinic Level of Care Assessment  Infusion Center      NAME:  Danii Rom OF BIRTH:  1985 GENDER: female  MEDICAL RECORD NUMBER:  [id number]   DATE:  5/30/2023     Ambulation Status Document in Daily Care/Safety Tab   Status Definition Points   Independent Independently able to ambulate. Fully able (without any assistance) to get on/off exam table/chair.   [x]   0   Minimal Physical

## 2023-05-31 ENCOUNTER — TELEMEDICINE (OUTPATIENT)
Dept: INFECTIOUS DISEASES | Age: 38
End: 2023-05-31
Payer: MEDICAID

## 2023-05-31 ENCOUNTER — TELEPHONE (OUTPATIENT)
Dept: INFECTIOUS DISEASES | Age: 38
End: 2023-05-31

## 2023-05-31 DIAGNOSIS — E66.01 MORBID OBESITY WITH BMI OF 50.0-59.9, ADULT (HCC): ICD-10-CM

## 2023-05-31 DIAGNOSIS — Z79.2 RECEIVING INTRAVENOUS ANTIBIOTIC TREATMENT AS OUTPATIENT: ICD-10-CM

## 2023-05-31 DIAGNOSIS — K80.50 CHOLEDOCHOLITHIASIS: ICD-10-CM

## 2023-05-31 DIAGNOSIS — K65.1 INTRA-ABDOMINAL ABSCESS (HCC): Primary | ICD-10-CM

## 2023-05-31 DIAGNOSIS — Z45.2 PERIPHERALLY INSERTED CENTRAL CATHETER (PICC) IN PLACE: ICD-10-CM

## 2023-05-31 DIAGNOSIS — R70.0 ELEVATED ERYTHROCYTE SEDIMENTATION RATE: ICD-10-CM

## 2023-05-31 PROCEDURE — 99214 OFFICE O/P EST MOD 30 MIN: CPT | Performed by: INTERNAL MEDICINE

## 2023-05-31 PROCEDURE — G8417 CALC BMI ABV UP PARAM F/U: HCPCS | Performed by: INTERNAL MEDICINE

## 2023-05-31 PROCEDURE — G8427 DOCREV CUR MEDS BY ELIG CLIN: HCPCS | Performed by: INTERNAL MEDICINE

## 2023-05-31 PROCEDURE — 1036F TOBACCO NON-USER: CPT | Performed by: INTERNAL MEDICINE

## 2023-05-31 NOTE — PROGRESS NOTES
Lab Results   Component Value Date/Time    BACTERIA None Seen 04/21/2023 04:23 AM    HYALCAST 1 04/21/2023 04:23 AM    WBCUA 1 04/21/2023 04:23 AM    RBCUA 5 04/21/2023 04:23 AM    EPIU 5 04/21/2023 04:23 AM     Urine Reflex to Culture:   Lab Results   Component Value Date/Time    URRFLXCULT Not Indicated 04/21/2023 04:23 AM       MICRO: cultures reviewed and updated by me   Component 4/21/23 1116   Gram Stain Result  Abnormal   4+ WBC's (Polymorphonuclear)   2+ Gram positive cocci   4+ Small Gram negative rods     Organism Streptococcus anginosus Abnormal     WOUND/ABSCESS Light growth   No further workup   Refer to culture [id number] for sensitivity results    Organism Prevotella denticola Abnormal     Anaerobic Culture Moderate growth   Beta lactamase negative. Sensitivities not routinely done. Drugs of choice: Penicillin G, Clindamycin or Metronidazole. Resulting Agency [email or web address]           Narrative  Performed by: [email or web address]  ORDER#: [id number]                          ORDERED BY: Lois Leung, [place]   SOURCE: Abscess Abdomen                    COLLECTED:  04/21/23 11:16   ANTIBIOTICS AT [facility].:                      RECEIVED :  04/21/23 11:50           Respiratory Culture:  Lab Results   Component Value Date/Time    [facility]  04/21/2023 11:16 AM     4+ WBC's (Polymorphonuclear)  2+ Gram positive cocci  4+ Small Gram negative rods       AFB:No results found for: AFBSMEAR    Urine Culture: No results for input(s): Mark Black in the last 72 hours. Imaging:  CT ABDOMEN PELVIS W IV CONTRAST Additional Contrast? None    (Results Pending)     CT abd/pelvis 5/18/23    Right upper quadrant drainage catheter has been removed and residual fluid  collection in the gallbladder fossa measures 3.2 x 1.9 cm. Adjacent  intrahepatic fluid collection measuring 4.1 x 2.0 cm has decreased in size  and there is surrounding hypodensity likely representing edema.   A  subcapsular fluid collection along the right

## 2023-06-05 ENCOUNTER — HOSPITAL ENCOUNTER (OUTPATIENT)
Dept: CT IMAGING | Age: 38
Discharge: HOME | End: 2023-06-05
Attending: INTERNAL MEDICINE
Payer: MEDICAID

## 2023-06-05 DIAGNOSIS — K65.1 INTRA-ABDOMINAL ABSCESS (HCC): ICD-10-CM

## 2023-06-05 PROCEDURE — 74177 CT ABD & PELVIS W/CONTRAST: CPT

## 2023-06-05 PROCEDURE — 6360000004 HC RX CONTRAST MEDICATION: Performed by: INTERNAL MEDICINE

## 2023-06-05 RX ADMIN — IOMEPROL INJECTION 100 ML: 714 INJECTION, SOLUTION INTRAVASCULAR at 10:20

## 2023-06-06 ENCOUNTER — TELEPHONE (OUTPATIENT)
Dept: INFECTIOUS DISEASES | Age: 38
End: 2023-06-06

## 2023-06-06 ENCOUNTER — HOSPITAL ENCOUNTER (OUTPATIENT)
Dept: INFUSION THERAPY | Age: 38
Setting detail: INFUSION SERIES
Discharge: HOME | End: 2023-06-06
Payer: MEDICAID

## 2023-06-06 VITALS — RESPIRATION RATE: 16 BRPM | TEMPERATURE: 98 F

## 2023-06-06 DIAGNOSIS — A41.9 SEVERE SEPSIS (HCC): Primary | ICD-10-CM

## 2023-06-06 DIAGNOSIS — R65.20 SEVERE SEPSIS (HCC): Primary | ICD-10-CM

## 2023-06-06 LAB
ALBUMIN SERPL-MCNC: 3.6 G/DL (ref 3.4–5)
ALBUMIN/GLOB SERPL: 1.1 {RATIO} (ref 1.1–2.2)
ALP SERPL-CCNC: 137 U/L (ref 40–129)
ALT SERPL-CCNC: 13 U/L (ref 10–40)
ANION GAP SERPL CALCULATED.3IONS-SCNC: 9 MMOL/L (ref 3–16)
AST SERPL-CCNC: 25 U/L (ref 15–37)
BASOPHILS # BLD: 0.1 K/UL (ref 0–0.2)
BASOPHILS NFR BLD: 1.1 %
BILIRUB SERPL-MCNC: <0.2 MG/DL (ref 0–1)
BUN SERPL-MCNC: 8 MG/DL (ref 7–20)
CALCIUM SERPL-MCNC: 8.7 MG/DL (ref 8.3–10.6)
CHLORIDE SERPL-SCNC: 109 MMOL/L (ref 99–110)
CO2 SERPL-SCNC: 22 MMOL/L (ref 21–32)
CREAT SERPL-MCNC: 0.6 MG/DL (ref 0.6–1.1)
CRP SERPL-MCNC: 11.3 MG/L (ref 0–5.1)
DEPRECATED RDW RBC AUTO: 18.1 % (ref 12.4–15.4)
EOSINOPHIL # BLD: 0.3 K/UL (ref 0–0.6)
EOSINOPHIL NFR BLD: 3.9 %
ERYTHROCYTE [SEDIMENTATION RATE] IN BLOOD BY WESTERGREN METHOD: 72 MM/HR (ref 0–20)
GFR SERPLBLD CREATININE-BSD FMLA CKD-EPI: >60 ML/MIN/{1.73_M2}
GLUCOSE SERPL-MCNC: 170 MG/DL (ref 70–99)
HCT VFR BLD AUTO: 36.9 % (ref 36–48)
HGB BLD-MCNC: 12 G/DL (ref 12–16)
LYMPHOCYTES # BLD: 2.1 K/UL (ref 1–5.1)
LYMPHOCYTES NFR BLD: 31.8 %
MCH RBC QN AUTO: 29.9 PG (ref 26–34)
MCHC RBC AUTO-ENTMCNC: 32.6 G/DL (ref 31–36)
MCV RBC AUTO: 91.7 FL (ref 80–100)
MONOCYTES # BLD: 0.4 K/UL (ref 0–1.3)
MONOCYTES NFR BLD: 5.7 %
NEUTROPHILS # BLD: 3.8 K/UL (ref 1.7–7.7)
NEUTROPHILS NFR BLD: 57.5 %
PLATELET # BLD AUTO: 301 K/UL (ref 135–450)
PMV BLD AUTO: 7.1 FL (ref 5–10.5)
POTASSIUM SERPL-SCNC: 4.2 MMOL/L (ref 3.5–5.1)
PROT SERPL-MCNC: 6.9 G/DL (ref 6.4–8.2)
RBC # BLD AUTO: 4.02 M/UL (ref 4–5.2)
SODIUM SERPL-SCNC: 140 MMOL/L (ref 136–145)
WBC # BLD AUTO: 6.6 K/UL (ref 4–11)

## 2023-06-06 PROCEDURE — 86140 C-REACTIVE PROTEIN: CPT

## 2023-06-06 PROCEDURE — 99212 OFFICE O/P EST SF 10 MIN: CPT

## 2023-06-06 PROCEDURE — 36592 COLLECT BLOOD FROM PICC: CPT

## 2023-06-06 PROCEDURE — 80053 COMPREHEN METABOLIC PANEL: CPT

## 2023-06-06 PROCEDURE — 99213 OFFICE O/P EST LOW 20 MIN: CPT

## 2023-06-06 PROCEDURE — 85652 RBC SED RATE AUTOMATED: CPT

## 2023-06-06 PROCEDURE — 85025 COMPLETE CBC W/AUTO DIFF WBC: CPT

## 2023-06-06 RX ORDER — SODIUM CHLORIDE 0.9 % (FLUSH) 0.9 %
5-40 SYRINGE (ML) INJECTION PRN
Status: CANCELLED | OUTPATIENT
Start: 2023-06-12

## 2023-06-06 NOTE — TELEPHONE ENCOUNTER
Waiting on CT abd results looks like its in process was told results are 24-48 hrs delay in reporting ?

## 2023-06-08 NOTE — PROGRESS NOTES
[address] Line Removal    NAME:  Luca Simpson  YOB: 1985  MEDICAL RECORD NUMBER:  [id number]  DATE:  6/6/2023    Patient arrived to [facility]   [] per wheelchair   [x] ambulatory     PICC Location:  left arm    Patient has been treated for:  abd abscess ( disease or infection )    Patient has completed treatment of: 8  weeks of treatment     Order for removal of PICC line given by: Dr Bola Souza    PICC placed on:   April 18,2023. PICC cut to 44 cm. Patient positioned:   [] Lying   [x] Sitting   [] Other         PICC dressing removed:  Yes     New pair of sterile gloves donned and sterile 4 x 4 gauze placed under PICC line: Yes    Keeping a sterile 4x4 over top of exit site, PICC removed slowly by small increments and line not stretched. PICC line removed without any complications:  Yes    Pressure held to exit site for 30 - 60 seconds:  Yes    Bleeding at site:  no    Site scrubbed with Chloroprep Scrub for 30 seconds and air dried completely for 1 minute. Yes  Small piece of vaseline gauze placed over exit site and covered with a dry 2 x 2 gauze and covered with a tagaderm dressing. Yes     Patient instructed to keep dressing dry and intact for 48 hours. Yes      Response to treatment:  Well tolerated by patient.       Electronically signed by Tasha Domingo RN on 6/6/2023 at 1:23 PM
Outpatient [place] [address]    Peripheral Lab Draw    NAME:  Medardo Connell  YOB: 1985  MEDICAL RECORD NUMBER:  [id number]  Episode Date:  6/6/2023    Blood Draw Site: Location:right arm  Site cleansed with Chloroprep Scrub for 30 seconds: Yes  Site cleansed with Alcohol pads: Yes  Labs drawn with: 23 gauge             [x] Butterfly    [] Needle  Labs Obtained: Yes  Number of attempts: 1    Lab Test(s) Ordered: CBC,BMP,ESR and crp    Lab Draw Site:  Redness: No  Bruising: No   Edema: No  Pain: No     Response to treatment:  Well tolerated by patient.       Electronically signed by Graciela Chavez RN on 6/8/2023 at 2:59 PM
References and Attachments. May also be documentation in Education Navigator. Copy given to patient. []   3           Patient Assessment  Planning Definition Points   Simple Complete all tabs in patient assessment navigator. Review or complete patient'sTherapy Plan. [x]   1   Intermediate Complete all tabs in patient assessment navigator. Review or completed patient'sTherapy Plan. Contact doctor based on nursing assessment. []   2   Complex Complete all tabs in patient assessment navigator. Completed patient's Therapy Plan. Contact doctor based on nursing assessment and write order related to needed care. []   3     Patient Planning   Planning Definition Points   Simple Discharged, instructions and After Visit Summary given to patient/caregiver and instructions completed. Simple follow-up with routine assessment and planning. [x]   1   Intermediate Discharged, instructions and After Visit Summary given to patient/caregiver and instructions completed. Contact with outside resources; i.e. Telephone calls to PCP, home health, ECF and/or arranging transportation. []   2   Complex Discharged, instructions and After Visit Summary given to patient/caregiver and instructions completed. Contact with outside resources; i.e. Telephone calls to PCP, home health, ECF and/or arranging transportation. May include filling out forms and/or writing letters, communication with insurance , preauthorization for treatment.    []   3       Is this the Patient's First Visit to the [facility]  No    Is this Patient Established @ this [facility] SPECIALTY [facility]              Clinical Level of Care      Points  0-2  Level 1 []     Points  3-5  Level 2 [x]     Points  6-9  Level 3 []     Points  10-12  Level 4 []     Points  13-15  Level 5 []       Electronically signed by Sandor Buerger, RN on 6/6/2023 at 1:34 PM

## 2023-06-20 ENCOUNTER — ANESTHESIA EVENT (OUTPATIENT)
Dept: ENDOSCOPY | Age: 38
End: 2023-06-20
Payer: MEDICAID

## 2023-06-21 ENCOUNTER — HOSPITAL ENCOUNTER (OUTPATIENT)
Age: 38
Setting detail: OUTPATIENT SURGERY
Discharge: HOME | End: 2023-06-21
Attending: INTERNAL MEDICINE | Admitting: INTERNAL MEDICINE
Payer: MEDICAID

## 2023-06-21 ENCOUNTER — ANESTHESIA (OUTPATIENT)
Dept: ENDOSCOPY | Age: 38
End: 2023-06-21
Payer: MEDICAID

## 2023-06-21 ENCOUNTER — APPOINTMENT (OUTPATIENT)
Dept: GENERAL RADIOLOGY | Age: 38
End: 2023-06-21
Attending: INTERNAL MEDICINE
Payer: MEDICAID

## 2023-06-21 VITALS
HEIGHT: 62 IN | TEMPERATURE: 96.8 F | DIASTOLIC BLOOD PRESSURE: 86 MMHG | WEIGHT: 248 LBS | RESPIRATION RATE: 18 BRPM | SYSTOLIC BLOOD PRESSURE: 140 MMHG | BODY MASS INDEX: 45.64 KG/M2 | OXYGEN SATURATION: 95 % | HEART RATE: 89 BPM

## 2023-06-21 PROCEDURE — 3609015000 HC ERCP REMOVE FOREIGN BODY/STENT BILIARY/PANC DUCT: Performed by: INTERNAL MEDICINE

## 2023-06-21 PROCEDURE — 2580000003 HC RX 258: Performed by: NURSE ANESTHETIST, CERTIFIED REGISTERED

## 2023-06-21 PROCEDURE — C1769 GUIDE WIRE: HCPCS | Performed by: INTERNAL MEDICINE

## 2023-06-21 PROCEDURE — 2580000003 HC RX 258: Performed by: ANESTHESIOLOGY

## 2023-06-21 PROCEDURE — 7100000010 HC PHASE II RECOVERY - FIRST 15 MIN: Performed by: INTERNAL MEDICINE

## 2023-06-21 PROCEDURE — 7100000000 HC PACU RECOVERY - FIRST 15 MIN: Performed by: INTERNAL MEDICINE

## 2023-06-21 PROCEDURE — 6360000002 HC RX W HCPCS: Performed by: NURSE ANESTHETIST, CERTIFIED REGISTERED

## 2023-06-21 PROCEDURE — 7100000001 HC PACU RECOVERY - ADDTL 15 MIN: Performed by: INTERNAL MEDICINE

## 2023-06-21 PROCEDURE — 2500000003 HC RX 250 WO HCPCS: Performed by: NURSE ANESTHETIST, CERTIFIED REGISTERED

## 2023-06-21 PROCEDURE — 7100000011 HC PHASE II RECOVERY - ADDTL 15 MIN: Performed by: INTERNAL MEDICINE

## 2023-06-21 PROCEDURE — 6360000004 HC RX CONTRAST MEDICATION: Performed by: INTERNAL MEDICINE

## 2023-06-21 PROCEDURE — 2720000010 HC SURG SUPPLY STERILE: Performed by: INTERNAL MEDICINE

## 2023-06-21 PROCEDURE — 3700000001 HC ADD 15 MINUTES (ANESTHESIA): Performed by: INTERNAL MEDICINE

## 2023-06-21 PROCEDURE — 3700000000 HC ANESTHESIA ATTENDED CARE: Performed by: INTERNAL MEDICINE

## 2023-06-21 PROCEDURE — 74328 X-RAY BILE DUCT ENDOSCOPY: CPT

## 2023-06-21 PROCEDURE — 3609015200 HC ERCP REMOVE CALCULI/DEBRIS BILIARY/PANCREAS DUCT: Performed by: INTERNAL MEDICINE

## 2023-06-21 PROCEDURE — 2709999900 HC NON-CHARGEABLE SUPPLY: Performed by: INTERNAL MEDICINE

## 2023-06-21 RX ORDER — ONDANSETRON 2 MG/ML
INJECTION INTRAMUSCULAR; INTRAVENOUS PRN
Status: DISCONTINUED | OUTPATIENT
Start: 2023-06-21 | End: 2023-06-21 | Stop reason: SDUPTHER

## 2023-06-21 RX ORDER — LIDOCAINE HYDROCHLORIDE 20 MG/ML
INJECTION, SOLUTION EPIDURAL; INFILTRATION; INTRACAUDAL; PERINEURAL PRN
Status: DISCONTINUED | OUTPATIENT
Start: 2023-06-21 | End: 2023-06-21 | Stop reason: SDUPTHER

## 2023-06-21 RX ORDER — PROPOFOL 10 MG/ML
INJECTION, EMULSION INTRAVENOUS PRN
Status: DISCONTINUED | OUTPATIENT
Start: 2023-06-21 | End: 2023-06-21 | Stop reason: SDUPTHER

## 2023-06-21 RX ORDER — SODIUM CHLORIDE 0.9 % (FLUSH) 0.9 %
5-40 SYRINGE (ML) INJECTION EVERY 12 HOURS SCHEDULED
Status: DISCONTINUED | OUTPATIENT
Start: 2023-06-21 | End: 2023-06-21 | Stop reason: HOSPADM

## 2023-06-21 RX ORDER — SODIUM CHLORIDE 0.9 % (FLUSH) 0.9 %
5-40 SYRINGE (ML) INJECTION PRN
Status: DISCONTINUED | OUTPATIENT
Start: 2023-06-21 | End: 2023-06-21 | Stop reason: HOSPADM

## 2023-06-21 RX ORDER — HALOPERIDOL 5 MG/ML
1 INJECTION INTRAMUSCULAR
Status: DISCONTINUED | OUTPATIENT
Start: 2023-06-21 | End: 2023-06-21 | Stop reason: HOSPADM

## 2023-06-21 RX ORDER — ONDANSETRON 2 MG/ML
4 INJECTION INTRAMUSCULAR; INTRAVENOUS
Status: DISCONTINUED | OUTPATIENT
Start: 2023-06-21 | End: 2023-06-21 | Stop reason: HOSPADM

## 2023-06-21 RX ORDER — MONTELUKAST SODIUM 4 MG/1
1 TABLET, CHEWABLE ORAL 2 TIMES DAILY
Qty: 60 TABLET | Refills: 3 | Status: SHIPPED | OUTPATIENT
Start: 2023-06-21

## 2023-06-21 RX ORDER — SODIUM CHLORIDE 9 MG/ML
INJECTION, SOLUTION INTRAVENOUS PRN
Status: DISCONTINUED | OUTPATIENT
Start: 2023-06-21 | End: 2023-06-21 | Stop reason: HOSPADM

## 2023-06-21 RX ORDER — DIPHENHYDRAMINE HYDROCHLORIDE 50 MG/ML
12.5 INJECTION INTRAMUSCULAR; INTRAVENOUS
Status: DISCONTINUED | OUTPATIENT
Start: 2023-06-21 | End: 2023-06-21 | Stop reason: HOSPADM

## 2023-06-21 RX ORDER — SUCCINYLCHOLINE/SOD CL,ISO/PF 200MG/10ML
SYRINGE (ML) INTRAVENOUS PRN
Status: DISCONTINUED | OUTPATIENT
Start: 2023-06-21 | End: 2023-06-21 | Stop reason: SDUPTHER

## 2023-06-21 RX ORDER — FENTANYL CITRATE 50 UG/ML
50 INJECTION, SOLUTION INTRAMUSCULAR; INTRAVENOUS EVERY 5 MIN PRN
Status: DISCONTINUED | OUTPATIENT
Start: 2023-06-21 | End: 2023-06-21 | Stop reason: HOSPADM

## 2023-06-21 RX ORDER — LORAZEPAM 2 MG/ML
0.5 INJECTION INTRAMUSCULAR
Status: DISCONTINUED | OUTPATIENT
Start: 2023-06-21 | End: 2023-06-21 | Stop reason: HOSPADM

## 2023-06-21 RX ORDER — DEXAMETHASONE SODIUM PHOSPHATE 4 MG/ML
INJECTION, SOLUTION INTRA-ARTICULAR; INTRALESIONAL; INTRAMUSCULAR; INTRAVENOUS; SOFT TISSUE PRN
Status: DISCONTINUED | OUTPATIENT
Start: 2023-06-21 | End: 2023-06-21 | Stop reason: SDUPTHER

## 2023-06-21 RX ORDER — SODIUM CHLORIDE 9 MG/ML
INJECTION, SOLUTION INTRAVENOUS CONTINUOUS PRN
Status: DISCONTINUED | OUTPATIENT
Start: 2023-06-21 | End: 2023-06-21 | Stop reason: SDUPTHER

## 2023-06-21 RX ORDER — FENTANYL CITRATE 50 UG/ML
INJECTION, SOLUTION INTRAMUSCULAR; INTRAVENOUS PRN
Status: DISCONTINUED | OUTPATIENT
Start: 2023-06-21 | End: 2023-06-21 | Stop reason: SDUPTHER

## 2023-06-21 RX ORDER — MEPERIDINE HYDROCHLORIDE 25 MG/ML
12.5 INJECTION INTRAMUSCULAR; INTRAVENOUS; SUBCUTANEOUS
Status: DISCONTINUED | OUTPATIENT
Start: 2023-06-21 | End: 2023-06-21 | Stop reason: HOSPADM

## 2023-06-21 RX ORDER — GLYCOPYRROLATE 0.2 MG/ML
INJECTION INTRAMUSCULAR; INTRAVENOUS PRN
Status: DISCONTINUED | OUTPATIENT
Start: 2023-06-21 | End: 2023-06-21 | Stop reason: SDUPTHER

## 2023-06-21 RX ADMIN — SODIUM CHLORIDE: 9 INJECTION, SOLUTION INTRAVENOUS at 09:18

## 2023-06-21 RX ADMIN — LIDOCAINE HYDROCHLORIDE 60 MG: 20 INJECTION, SOLUTION EPIDURAL; INFILTRATION; INTRACAUDAL; PERINEURAL at 09:22

## 2023-06-21 RX ADMIN — SODIUM CHLORIDE: 9 INJECTION, SOLUTION INTRAVENOUS at 08:16

## 2023-06-21 RX ADMIN — ONDANSETRON 4 MG: 2 INJECTION INTRAMUSCULAR; INTRAVENOUS at 09:28

## 2023-06-21 RX ADMIN — PROPOFOL 200 MG: 10 INJECTION, EMULSION INTRAVENOUS at 09:22

## 2023-06-21 RX ADMIN — Medication 160 MG: at 09:22

## 2023-06-21 RX ADMIN — DEXAMETHASONE SODIUM PHOSPHATE 8 MG: 4 INJECTION, SOLUTION INTRAMUSCULAR; INTRAVENOUS at 09:28

## 2023-06-21 RX ADMIN — PROPOFOL 50 MG: 10 INJECTION, EMULSION INTRAVENOUS at 09:28

## 2023-06-21 RX ADMIN — GLYCOPYRROLATE 0.2 MG: 0.2 INJECTION INTRAMUSCULAR; INTRAVENOUS at 09:28

## 2023-06-21 RX ADMIN — FENTANYL CITRATE 50 MCG: 50 INJECTION INTRAMUSCULAR; INTRAVENOUS at 09:22

## 2023-06-21 RX ADMIN — FENTANYL CITRATE 50 MCG: 50 INJECTION INTRAMUSCULAR; INTRAVENOUS at 09:19

## 2023-06-21 ASSESSMENT — PAIN SCALES - GENERAL
PAINLEVEL_OUTOF10: 0

## 2023-06-21 ASSESSMENT — PAIN - FUNCTIONAL ASSESSMENT: PAIN_FUNCTIONAL_ASSESSMENT: 0-10

## 2023-06-21 ASSESSMENT — ENCOUNTER SYMPTOMS: SHORTNESS OF BREATH: 0

## 2023-06-21 NOTE — PROGRESS NOTES
Pt resting comfortably in bed, still denies pain or nausea at this time, vss. Tolerating ice chips, states is ready to move to phase 2. No signs of distress noted at this time.

## 2023-06-21 NOTE — DISCHARGE INSTRUCTIONS
Impression:  1. Resolution of bile leak. Biliary stent removed. Balloon sweeps negative for stones. Recommendations:  1. Clear diet. If does well overnight can advance diet as tolerated tomorrow. 2.  Call as needed for abdominal pain or fevers. Sammi Nolan  6/20/2023    Discharge Instructions for ERCP    Endoscopic retrograde cholangiopancreatography (ERCP) allows the physician to enter the bile duct and the pancreatic duct with a probe. Contrast dye can then be injected into these ducts allowing the physician to evaluate the duct on x-ray imaging. If a stone is identified, this can be removed by the endoscopist.  If a stricture is identified, biopsies can be obtained and a stent can be placed. The primary complication of ERCP is pancreatitis. This occurs because the bile duct and the pancreatic duct empty through the same opening. Entering this opening can lead to irritation of the pancreas which contains digestive enzymes. If these enzymes are released in the pancreas, this can lead to an inflammatory response of the pancreas called pancreatitis. Usually, pancreatitis resolves in 3-4 days. Treatment is with pancreatic rest (nothing to eat) and intravenous fluids. Rarely pancreatitis can be more severe and last months to years or require surgery. Other risks with ERCP include perforation (which might require surgery) and bleeding (which could require blood transfusion ). What You Will Need:  Someone to drive you home after the procedure     Steps to Take:  [address] when you get home. Because the sedative will make you drowsy, don't drive, operate machinery, or make important decisions the day of the procedure. Feelings of bloating, gas, or cramping may persist for 24 hours. Diet -  Stay on a clear liquid diet the day of the procedure. This include jello or clear broth. Do not drink alcohol for 24 hours.    If clear diet is tolerated, then the day

## 2023-06-21 NOTE — H&P
Pre-operative History and Physical    Patient: Margie Stiles  : 1985  Acct#:     HISTORY OF PRESENT ILLNESS:    The patient is a 44 y.o. female who presents with bile leak. She had a cholecystectomy with Dr. Eamon Cox and gallbladder was so inflamed it was difficult to close the cystic stump. I performed ERCP with biliary sphincterotomy 23 and a stone was removed. No obvious bile leak but I did place a stent given the intraoperative inflammation. Repeat ERCP 5/3/23 for re-evaluation showed a bile leak off the cystic stump and a 10Fr 9cm biliary stent was placed. Here for re-evaluation today. Past Medical History:        Diagnosis Date    Acid reflux     mild    Anxiety and depression     Bipolar 1 disorder (HCC)     Hiatal hernia     Migraine     Obesity     Thyroid disease     Wears glasses       Past Surgical History:        Procedure Laterality Date    CARPAL TUNNEL RELEASE Right 2014    CARPAL TUNNEL RELEASE Left 2016     SECTION   and     CHOLECYSTECTOMY, LAPAROSCOPIC N/A 2023    LAPAROSCOPIC CHOLECYSTECTOMY WITH CHOLANGIOGRAM performed by Sarah Carlos MD at [place] [address] Right 2023    10F.  Dr. Yumiko West, COLON, DIAGNOSTIC      ERCP N/A 2023    ERCP SPHINCTEROTOMY performed by Daniel Ca MD at [address] [place] [address]    ERCP N/A 2023    ERCP BALLOON SWEEP W/ STONE REMOVAL performed by Daniel Ca MD at [address] [place] [address]    ERCP N/A 2023    ERCP STENT INSERTION performed by Daniel Ca MD at [address] [place] [address]    ERCP  2023    Dr Law Arias stent exchange    ERCP N/A 5/3/2023    ERCP STENT REMOVAL/EXCHANGE performed by Daniel Ca MD at [address] [place] [address]    ERCP N/A 5/3/2023    ERCP STONE REMOVAL WITH BALLOON SWEEP  performed by Daniel Ca MD at [address] [place] [address] Left 2016    WISDOM TOOTH EXTRACTION  2002

## 2023-06-21 NOTE — PROGRESS NOTES
Tolerating oral intake and being up on side of bed. Discharge instructions given to patient and significant other. Verbalize understanding. No complaints.

## 2023-06-21 NOTE — ANESTHESIA POSTPROCEDURE EVALUATION
Department of Anesthesiology  Postprocedure Note    Patient: Betty Mora  MRN: [id number]  YOB: 1985  Date of evaluation: 6/21/2023      Procedure Summary     Date: 06/21/23 Room / Location: [address]    Anesthesia Start: 8611 Anesthesia Stop: 0957    Procedures:       ERCP STENT REMOVAL      ERCP BALLOON SWEEP Diagnosis:       Bile leak      (BILE LEAK)    Surgeons: Rupal Edwards MD Responsible Provider: Destin Prado MD    Anesthesia Type: General ASA Status: 3          Anesthesia Type: General    Kenzie Phase I: Kenzie Score: 10    Kenzie Phase II: Kenzie Score: 10      Anesthesia Post Evaluation    Patient location during evaluation: bedside  Patient participation: complete - patient participated  Level of consciousness: awake and alert  Pain score: 0  Nausea & Vomiting: no nausea  Complications: no  Cardiovascular status: hemodynamically stable  Respiratory status: acceptable  Hydration status: stable

## 2023-06-21 NOTE — PROGRESS NOTES
Patient positioned semi-prone with head supported on foam ring pillow. Gel wedge positioned under right chest/upper abdomen. Patient secured to table with safety belt.      Fluoro time 151.5 secs    Cap intact post procedure and verified by [facility] RN and SPIKE RN    Pad site unremarkable post procedure

## 2023-06-21 NOTE — OP NOTE
Film:  Clips in the RUQ. Biliary stent in good position. Cholangiogram:  The biliary orifice was then selectively cannulated with a 9-12 extraction balloon. Contrast was injected and revealed a normal cholangiogram without strictures or filling defects and normal caliber duct. The cystic stump filled and there was no leak identified with occlusion cholangiogram.  Next, multiple balloon sweeps were performed negative for stones or sludge. Pancreatogram:  The pancreatic duct was not cannulated. The cannulas were then withdrawn. The duodenum and stomach were decompressed and the scope was withdrawn from the patient. The patient tolerated the procedure well and was taken to the post anesthesia care unit in good condition. Radiological Interpretation:  All fluoroscopic images and still x-ray films were carefullly examined and interpreted by the endoscopist on the spot during the procedure in the absence of radiologist.  These interpretations guided the course of the procedure and the interventions mentioned above and below. Estimated blood loss: minimal  Specimens taken: none      Impression:  1. Resolution of bile leak. Biliary stent removed. Balloon sweeps negative for stones. Recommendations:  1. Clear diet. If does well overnight can advance diet as tolerated tomorrow. 2.  Call as needed for abdominal pain or fevers.      Ruslan Lozada  6/20/2023

## 2023-06-21 NOTE — ANESTHESIA PRE PROCEDURE
[facility] Department of Anesthesiology  Pre-Anesthesia Evaluation/Consultation       Name:  Dahlia Aleman  : 1985  Age:  44 y.o. MRN:  [id number]  Date: 2023           Surgeon: Surgeon(s):  Michaela Parada MD    Procedure: Procedure(s):  ENDOSCOPIC RETROGRADE CHOLANGIOPANCREATOGRAPHY     No Known Allergies  Patient Active Problem List   Diagnosis    Depression    Carpal tunnel syndrome    Depression with anxiety    Cubital tunnel syndrome    ADHD, predominantly inattentive type    Bipolar 2 disorder ([address]    Moderate single current episode of major depressive disorder ([address]    Choledocholithiasis    Intra-abdominal abscess ([address]    Sepsis ([address]    Neutrophilia    Fever and chills    Morbid obesity with BMI of 50.0-59.9, adult ([address]    History of laparoscopic cholecystectomy    Enterococcal infection    Severe sepsis (HCC)    Septicemia ([address]    Elevated erythrocyte sedimentation rate    CRP elevated    Peripherally inserted central catheter (PICC) in place     Past Medical History:   Diagnosis Date    Acid reflux     mild    Anxiety and depression     Bipolar 1 disorder ([address]     Hiatal hernia     Migraine     Obesity     Thyroid disease     Wears glasses      Past Surgical History:   Procedure Laterality Date    CARPAL TUNNEL RELEASE Right 2014    CARPAL TUNNEL RELEASE Left 2016     SECTION   and     CHOLECYSTECTOMY, LAPAROSCOPIC N/A 2023    LAPAROSCOPIC CHOLECYSTECTOMY WITH CHOLANGIOGRAM performed by Julián Moon MD at [address] [place] [address] Right 2023    10F.  Dr. Karely Willard, COLON, DIAGNOSTIC      ERCP N/A 2023    ERCP SPHINCTEROTOMY performed by Michaela Parada MD at [facility]  ERCP N/A 2023    ERCP BALLOON SWEEP W/ STONE REMOVAL performed by Michaela Parada MD at [facility]  ERCP N/A 2023    ERCP

## (undated) DEVICE — GLOVE ORANGE PI 7   MSG9070

## (undated) DEVICE — SINGLE-USE POLYPECTOMY SNARE: Brand: CAPTIFLEX

## (undated) DEVICE — TROCAR: Brand: KII SLEEVE

## (undated) DEVICE — 3M™ STERI-STRIP™ COMPOUND BENZOIN TINCTURE 40 BAGS/CARTON 4 CARTONS/CASE C1544: Brand: 3M™ STERI-STRIP™

## (undated) DEVICE — ORGANIZER MED DEV W76XL86CM PCH W25XL28CM FOR ENDOSCP TBL

## (undated) DEVICE — GOWN SIRUS NONREIN LG W/TWL: Brand: MEDLINE INDUSTRIES, INC.

## (undated) DEVICE — Device

## (undated) DEVICE — GAUZE,SPONGE,2"X2",8PLY,STERILE,LF,2'S: Brand: MEDLINE

## (undated) DEVICE — ENDOSCOPY KIT: Brand: MEDLINE INDUSTRIES, INC.

## (undated) DEVICE — SUTURE VCRL + SZ 4-0 L18IN ABSRB UD L19MM PS-2 3/8 CIR PRIM VCP496H

## (undated) DEVICE — GENERAL LAPAROSCOPY: Brand: MEDLINE INDUSTRIES, INC.

## (undated) DEVICE — C-ARM: Brand: UNBRANDED

## (undated) DEVICE — INDICATED FOR USE DURING OPEN AND LAPAROSCOPIC CHOLECYSTECTOMY PROCEDURES TO INJECT RADIOPAQUE MEDIA THROUGH THE CYSTIC DUCT INTO THE BILIARY TREE.: Brand: AEROSTAT®

## (undated) DEVICE — SINGLE USE DISTAL COVER MAJ-2315: Brand: SINGLE USE DISTAL COVER

## (undated) DEVICE — SPHINCTEROTOME: Brand: JAGTOME RX 39

## (undated) DEVICE — DEVICE LOK BILI BX CAP RAP EXCHG DISPOSABLE

## (undated) DEVICE — SOLUTION IV 1000ML 0.9% SOD CHL FOR IRRIG PLAS CONT

## (undated) DEVICE — COVER LT HNDL BLU PLAS

## (undated) DEVICE — RETRIEVAL BALLOON CATHETER: Brand: EXTRACTOR™ PRO RX

## (undated) DEVICE — SET INSUF TUBE HEAT ISO CONN DISP

## (undated) DEVICE — STRIP,CLOSURE,WOUND,MEDI-STRIP,1/2X4: Brand: MEDLINE

## (undated) DEVICE — TISSUE RETRIEVAL SYSTEM: Brand: INZII RETRIEVAL SYSTEM

## (undated) DEVICE — AGENT HEMSTAT W4XL8IN OXIDIZED REGENERATED CELOS ABSRB

## (undated) DEVICE — SNARES COLD OVAL 10MM THIN

## (undated) DEVICE — SUTURE VCRL + SZ 0 L27IN ABSRB VLT L26MM UR-6 5/8 CIR VCP603H

## (undated) DEVICE — TROCARS: Brand: KII® BALLOON BLUNT TIP SYSTEM

## (undated) DEVICE — DRAIN SURG 19FR 100% SIL RADPQ RND CHN FULL FLUT

## (undated) DEVICE — TROCAR: Brand: KII FIOS FIRST ENTRY

## (undated) DEVICE — BITE BLOCK ENDOSCP AD 60 FR W/ ADJ STRP PLAS GRN BLOX

## (undated) DEVICE — SYRINGE MED 30ML STD CLR PLAS LUERLOCK TIP N CTRL DISP

## (undated) DEVICE — SYRINGE 20ML LL S/C 50

## (undated) DEVICE — ADTEC SINGLE USE HOOK SCISSORS, SHAFT ONLY, MONOPOLAR, STRAIGHT, WORKING LENGTH: 12 1/4", (310 MM), DIAM. 5 MM, BLUNT/BLUNT, INSULATED, SINGLE ACTION, STERILE, DISPOSABLE, PACKAGE OF 10 PIECES: Brand: AESCULAP

## (undated) DEVICE — 3M™ TEGADERM™ TRANSPARENT FILM DRESSING FRAME STYLE, 1624W, 2-3/8 IN X 2-3/4 IN (6 CM X 7 CM), 100/CT 4CT/CASE: Brand: 3M™ TEGADERM™

## (undated) DEVICE — RESERVOIR,SUCTION,100CC,SILICONE: Brand: MEDLINE

## (undated) DEVICE — HIGH PERFORMANCE GUIDEWIRE: Brand: JAGWIRE

## (undated) DEVICE — MERCY HEALTH WEST TURNOVER: Brand: MEDLINE INDUSTRIES, INC.

## (undated) DEVICE — NEEDLE 25GA X 1.5 IN ECLIPSE

## (undated) DEVICE — APPLIER CLP M/L SHFT DIA5MM 15 LIG LIGAMAX 5

## (undated) DEVICE — GARMENT COMPR STD FOR 17IN CALF UNIF THER FLOTRN

## (undated) DEVICE — ELECTRODE PT RET AD L9FT HI MOIST COND ADH HYDRGEL CORDED